# Patient Record
Sex: FEMALE | Race: WHITE | NOT HISPANIC OR LATINO
[De-identification: names, ages, dates, MRNs, and addresses within clinical notes are randomized per-mention and may not be internally consistent; named-entity substitution may affect disease eponyms.]

---

## 2017-07-28 PROBLEM — Z00.00 ENCOUNTER FOR PREVENTIVE HEALTH EXAMINATION: Status: ACTIVE | Noted: 2017-07-28

## 2017-07-31 ENCOUNTER — APPOINTMENT (OUTPATIENT)
Dept: ORTHOPEDIC SURGERY | Facility: CLINIC | Age: 82
End: 2017-07-31
Payer: MEDICARE

## 2017-07-31 VITALS
HEART RATE: 84 BPM | HEIGHT: 64 IN | BODY MASS INDEX: 19.63 KG/M2 | SYSTOLIC BLOOD PRESSURE: 138 MMHG | DIASTOLIC BLOOD PRESSURE: 74 MMHG | WEIGHT: 115 LBS

## 2017-07-31 DIAGNOSIS — Z82.61 FAMILY HISTORY OF ARTHRITIS: ICD-10-CM

## 2017-07-31 DIAGNOSIS — Z82.62 FAMILY HISTORY OF OSTEOPOROSIS: ICD-10-CM

## 2017-07-31 PROCEDURE — 99203 OFFICE O/P NEW LOW 30 MIN: CPT | Mod: 25

## 2017-07-31 PROCEDURE — 73130 X-RAY EXAM OF HAND: CPT | Mod: 26,RT

## 2017-07-31 PROCEDURE — 73110 X-RAY EXAM OF WRIST: CPT | Mod: 26,RT

## 2017-07-31 PROCEDURE — 20610 DRAIN/INJ JOINT/BURSA W/O US: CPT | Mod: RT

## 2017-08-07 ENCOUNTER — APPOINTMENT (OUTPATIENT)
Dept: ORTHOPEDIC SURGERY | Facility: CLINIC | Age: 82
End: 2017-08-07
Payer: MEDICARE

## 2017-08-07 PROCEDURE — 99213 OFFICE O/P EST LOW 20 MIN: CPT

## 2018-07-13 PROBLEM — M65.4 DE QUERVAIN'S DISEASE (RADIAL STYLOID TENOSYNOVITIS): Status: ACTIVE | Noted: 2017-07-31

## 2018-07-13 PROBLEM — M18.11 PRIMARY OSTEOARTHRITIS OF FIRST CARPOMETACARPAL JOINT OF RIGHT HAND: Status: ACTIVE | Noted: 2017-07-31

## 2018-07-16 ENCOUNTER — APPOINTMENT (OUTPATIENT)
Dept: ORTHOPEDIC SURGERY | Facility: CLINIC | Age: 83
End: 2018-07-16
Payer: MEDICARE

## 2018-07-16 VITALS — HEIGHT: 64 IN | BODY MASS INDEX: 19.63 KG/M2 | WEIGHT: 115 LBS

## 2018-07-16 DIAGNOSIS — M65.4 RADIAL STYLOID TENOSYNOVITIS [DE QUERVAIN]: ICD-10-CM

## 2018-07-16 DIAGNOSIS — M18.11 UNILATERAL PRIMARY OSTEOARTHRITIS OF FIRST CARPOMETACARPAL JOINT, RIGHT HAND: ICD-10-CM

## 2018-07-16 PROCEDURE — 99213 OFFICE O/P EST LOW 20 MIN: CPT | Mod: 25

## 2018-07-16 PROCEDURE — 20551 NJX 1 TENDON ORIGIN/INSJ: CPT | Mod: RT

## 2021-06-07 ENCOUNTER — APPOINTMENT (OUTPATIENT)
Dept: CARDIOLOGY | Facility: CLINIC | Age: 86
End: 2021-06-07

## 2022-11-14 ENCOUNTER — INPATIENT (INPATIENT)
Facility: HOSPITAL | Age: 87
LOS: 2 days | Discharge: HOME | DRG: 176 | End: 2022-11-17
Attending: INTERNAL MEDICINE | Admitting: INTERNAL MEDICINE
Payer: MEDICARE

## 2022-11-14 VITALS
HEART RATE: 88 BPM | SYSTOLIC BLOOD PRESSURE: 176 MMHG | HEIGHT: 64 IN | OXYGEN SATURATION: 97 % | TEMPERATURE: 98 F | WEIGHT: 104.94 LBS | RESPIRATION RATE: 16 BRPM | DIASTOLIC BLOOD PRESSURE: 86 MMHG

## 2022-11-14 DIAGNOSIS — I10 ESSENTIAL (PRIMARY) HYPERTENSION: ICD-10-CM

## 2022-11-14 DIAGNOSIS — R55 SYNCOPE AND COLLAPSE: ICD-10-CM

## 2022-11-14 DIAGNOSIS — K59.00 CONSTIPATION, UNSPECIFIED: ICD-10-CM

## 2022-11-14 DIAGNOSIS — I21.4 NON-ST ELEVATION (NSTEMI) MYOCARDIAL INFARCTION: ICD-10-CM

## 2022-11-14 LAB
ALBUMIN SERPL ELPH-MCNC: 4.3 G/DL — SIGNIFICANT CHANGE UP (ref 3.3–5)
ALP SERPL-CCNC: 78 U/L — SIGNIFICANT CHANGE UP (ref 40–120)
ALT FLD-CCNC: 23 U/L — SIGNIFICANT CHANGE UP (ref 10–45)
ANION GAP SERPL CALC-SCNC: 11 MMOL/L — SIGNIFICANT CHANGE UP (ref 5–17)
APPEARANCE UR: CLEAR — SIGNIFICANT CHANGE UP
APTT BLD: 28.4 SEC — SIGNIFICANT CHANGE UP (ref 27.5–35.5)
APTT BLD: 32.4 SEC — SIGNIFICANT CHANGE UP (ref 27.5–35.5)
AST SERPL-CCNC: 28 U/L — SIGNIFICANT CHANGE UP (ref 10–40)
BACTERIA # UR AUTO: NEGATIVE — SIGNIFICANT CHANGE UP
BASOPHILS # BLD AUTO: 0.06 K/UL — SIGNIFICANT CHANGE UP (ref 0–0.2)
BASOPHILS NFR BLD AUTO: 0.6 % — SIGNIFICANT CHANGE UP (ref 0–2)
BILIRUB SERPL-MCNC: 0.3 MG/DL — SIGNIFICANT CHANGE UP (ref 0.2–1.2)
BILIRUB UR-MCNC: NEGATIVE — SIGNIFICANT CHANGE UP
BUN SERPL-MCNC: 22 MG/DL — SIGNIFICANT CHANGE UP (ref 7–23)
CALCIUM SERPL-MCNC: 10 MG/DL — SIGNIFICANT CHANGE UP (ref 8.4–10.5)
CHLORIDE SERPL-SCNC: 100 MMOL/L — SIGNIFICANT CHANGE UP (ref 96–108)
CK MB BLD-MCNC: 12 % — HIGH (ref 0–3.5)
CK MB CFR SERPL CALC: 11 NG/ML — HIGH (ref 0–3.8)
CK MB CFR SERPL CALC: 14.7 NG/ML — HIGH (ref 0–3.8)
CK SERPL-CCNC: 123 U/L — SIGNIFICANT CHANGE UP (ref 25–170)
CO2 SERPL-SCNC: 25 MMOL/L — SIGNIFICANT CHANGE UP (ref 22–31)
COLOR SPEC: SIGNIFICANT CHANGE UP
CREAT SERPL-MCNC: 0.52 MG/DL — SIGNIFICANT CHANGE UP (ref 0.5–1.3)
D DIMER BLD IA.RAPID-MCNC: 395 NG/ML DDU — HIGH
DIFF PNL FLD: NEGATIVE — SIGNIFICANT CHANGE UP
EGFR: 91 ML/MIN/1.73M2 — SIGNIFICANT CHANGE UP
EOSINOPHIL # BLD AUTO: 0.05 K/UL — SIGNIFICANT CHANGE UP (ref 0–0.5)
EOSINOPHIL NFR BLD AUTO: 0.5 % — SIGNIFICANT CHANGE UP (ref 0–6)
EPI CELLS # UR: 0 /HPF — SIGNIFICANT CHANGE UP
GLUCOSE SERPL-MCNC: 113 MG/DL — HIGH (ref 70–99)
GLUCOSE UR QL: NEGATIVE — SIGNIFICANT CHANGE UP
HCT VFR BLD CALC: 41.1 % — SIGNIFICANT CHANGE UP (ref 34.5–45)
HGB BLD-MCNC: 13.3 G/DL — SIGNIFICANT CHANGE UP (ref 11.5–15.5)
HYALINE CASTS # UR AUTO: 0 /LPF — SIGNIFICANT CHANGE UP (ref 0–2)
IMM GRANULOCYTES NFR BLD AUTO: 0.3 % — SIGNIFICANT CHANGE UP (ref 0–0.9)
INR BLD: 1.1 RATIO — SIGNIFICANT CHANGE UP (ref 0.88–1.16)
KETONES UR-MCNC: NEGATIVE — SIGNIFICANT CHANGE UP
LEUKOCYTE ESTERASE UR-ACNC: ABNORMAL
LYMPHOCYTES # BLD AUTO: 1.08 K/UL — SIGNIFICANT CHANGE UP (ref 1–3.3)
LYMPHOCYTES # BLD AUTO: 10.4 % — LOW (ref 13–44)
MCHC RBC-ENTMCNC: 29.1 PG — SIGNIFICANT CHANGE UP (ref 27–34)
MCHC RBC-ENTMCNC: 32.4 GM/DL — SIGNIFICANT CHANGE UP (ref 32–36)
MCV RBC AUTO: 89.9 FL — SIGNIFICANT CHANGE UP (ref 80–100)
MONOCYTES # BLD AUTO: 0.68 K/UL — SIGNIFICANT CHANGE UP (ref 0–0.9)
MONOCYTES NFR BLD AUTO: 6.5 % — SIGNIFICANT CHANGE UP (ref 2–14)
NEUTROPHILS # BLD AUTO: 8.5 K/UL — HIGH (ref 1.8–7.4)
NEUTROPHILS NFR BLD AUTO: 81.7 % — HIGH (ref 43–77)
NITRITE UR-MCNC: NEGATIVE — SIGNIFICANT CHANGE UP
NRBC # BLD: 0 /100 WBCS — SIGNIFICANT CHANGE UP (ref 0–0)
PH UR: 6.5 — SIGNIFICANT CHANGE UP (ref 5–8)
PLATELET # BLD AUTO: 741 K/UL — HIGH (ref 150–400)
POTASSIUM SERPL-MCNC: 4 MMOL/L — SIGNIFICANT CHANGE UP (ref 3.5–5.3)
POTASSIUM SERPL-SCNC: 4 MMOL/L — SIGNIFICANT CHANGE UP (ref 3.5–5.3)
PROT SERPL-MCNC: 7.6 G/DL — SIGNIFICANT CHANGE UP (ref 6–8.3)
PROT UR-MCNC: NEGATIVE — SIGNIFICANT CHANGE UP
PROTHROM AB SERPL-ACNC: 12.7 SEC — SIGNIFICANT CHANGE UP (ref 10.5–13.4)
RBC # BLD: 4.57 M/UL — SIGNIFICANT CHANGE UP (ref 3.8–5.2)
RBC # FLD: 14.6 % — HIGH (ref 10.3–14.5)
RBC CASTS # UR COMP ASSIST: 0 /HPF — SIGNIFICANT CHANGE UP (ref 0–4)
SARS-COV-2 RNA SPEC QL NAA+PROBE: SIGNIFICANT CHANGE UP
SODIUM SERPL-SCNC: 136 MMOL/L — SIGNIFICANT CHANGE UP (ref 135–145)
SP GR SPEC: 1.01 — SIGNIFICANT CHANGE UP (ref 1.01–1.02)
TROPONIN T, HIGH SENSITIVITY RESULT: 352 NG/L — HIGH (ref 0–51)
TROPONIN T, HIGH SENSITIVITY RESULT: 384 NG/L — HIGH (ref 0–51)
TROPONIN T, HIGH SENSITIVITY RESULT: 475 NG/L — HIGH (ref 0–51)
TROPONIN T, HIGH SENSITIVITY RESULT: 485 NG/L — HIGH (ref 0–51)
UROBILINOGEN FLD QL: NEGATIVE — SIGNIFICANT CHANGE UP
WBC # BLD: 10.4 K/UL — SIGNIFICANT CHANGE UP (ref 3.8–10.5)
WBC # FLD AUTO: 10.4 K/UL — SIGNIFICANT CHANGE UP (ref 3.8–10.5)
WBC UR QL: 3 /HPF — SIGNIFICANT CHANGE UP (ref 0–5)

## 2022-11-14 PROCEDURE — 93010 ELECTROCARDIOGRAM REPORT: CPT

## 2022-11-14 PROCEDURE — 99291 CRITICAL CARE FIRST HOUR: CPT

## 2022-11-14 PROCEDURE — 72125 CT NECK SPINE W/O DYE: CPT | Mod: 26,MA

## 2022-11-14 PROCEDURE — 93306 TTE W/DOPPLER COMPLETE: CPT | Mod: 26

## 2022-11-14 PROCEDURE — 70450 CT HEAD/BRAIN W/O DYE: CPT | Mod: 26,MA

## 2022-11-14 RX ORDER — HEPARIN SODIUM 5000 [USP'U]/ML
INJECTION INTRAVENOUS; SUBCUTANEOUS
Qty: 25000 | Refills: 0 | Status: DISCONTINUED | OUTPATIENT
Start: 2022-11-14 | End: 2022-11-15

## 2022-11-14 RX ORDER — HEPARIN SODIUM 5000 [USP'U]/ML
2900 INJECTION INTRAVENOUS; SUBCUTANEOUS EVERY 6 HOURS
Refills: 0 | Status: DISCONTINUED | OUTPATIENT
Start: 2022-11-14 | End: 2022-11-15

## 2022-11-14 RX ORDER — ATORVASTATIN CALCIUM 80 MG/1
80 TABLET, FILM COATED ORAL AT BEDTIME
Refills: 0 | Status: DISCONTINUED | OUTPATIENT
Start: 2022-11-14 | End: 2022-11-17

## 2022-11-14 RX ORDER — METOPROLOL TARTRATE 50 MG
25 TABLET ORAL
Refills: 0 | Status: DISCONTINUED | OUTPATIENT
Start: 2022-11-14 | End: 2022-11-17

## 2022-11-14 RX ORDER — ACETAMINOPHEN 500 MG
650 TABLET ORAL ONCE
Refills: 0 | Status: COMPLETED | OUTPATIENT
Start: 2022-11-14 | End: 2022-11-14

## 2022-11-14 RX ORDER — ASPIRIN/CALCIUM CARB/MAGNESIUM 324 MG
324 TABLET ORAL ONCE
Refills: 0 | Status: COMPLETED | OUTPATIENT
Start: 2022-11-14 | End: 2022-11-14

## 2022-11-14 RX ORDER — TETANUS TOXOID, REDUCED DIPHTHERIA TOXOID AND ACELLULAR PERTUSSIS VACCINE, ADSORBED 5; 2.5; 8; 8; 2.5 [IU]/.5ML; [IU]/.5ML; UG/.5ML; UG/.5ML; UG/.5ML
0.5 SUSPENSION INTRAMUSCULAR ONCE
Refills: 0 | Status: COMPLETED | OUTPATIENT
Start: 2022-11-14 | End: 2022-11-14

## 2022-11-14 RX ORDER — ASPIRIN/CALCIUM CARB/MAGNESIUM 324 MG
81 TABLET ORAL DAILY
Refills: 0 | Status: DISCONTINUED | OUTPATIENT
Start: 2022-11-15 | End: 2022-11-17

## 2022-11-14 RX ORDER — PSYLLIUM SEED (WITH DEXTROSE)
1 POWDER (GRAM) ORAL
Refills: 0 | Status: DISCONTINUED | OUTPATIENT
Start: 2022-11-14 | End: 2022-11-17

## 2022-11-14 RX ADMIN — TETANUS TOXOID, REDUCED DIPHTHERIA TOXOID AND ACELLULAR PERTUSSIS VACCINE, ADSORBED 0.5 MILLILITER(S): 5; 2.5; 8; 8; 2.5 SUSPENSION INTRAMUSCULAR at 11:07

## 2022-11-14 RX ADMIN — HEPARIN SODIUM 600 UNIT(S)/HR: 5000 INJECTION INTRAVENOUS; SUBCUTANEOUS at 13:12

## 2022-11-14 RX ADMIN — Medication 25 MILLIGRAM(S): at 18:00

## 2022-11-14 RX ADMIN — Medication 650 MILLIGRAM(S): at 23:49

## 2022-11-14 RX ADMIN — HEPARIN SODIUM 2900 UNIT(S): 5000 INJECTION INTRAVENOUS; SUBCUTANEOUS at 20:45

## 2022-11-14 RX ADMIN — Medication 2.5 MILLIGRAM(S): at 14:51

## 2022-11-14 RX ADMIN — HEPARIN SODIUM 750 UNIT(S)/HR: 5000 INJECTION INTRAVENOUS; SUBCUTANEOUS at 20:42

## 2022-11-14 RX ADMIN — Medication 324 MILLIGRAM(S): at 12:27

## 2022-11-14 RX ADMIN — ATORVASTATIN CALCIUM 80 MILLIGRAM(S): 80 TABLET, FILM COATED ORAL at 14:50

## 2022-11-14 NOTE — H&P ADULT - NSICDXPASTMEDICALHX_GEN_ALL_CORE_FT
PAST MEDICAL HISTORY:  2019 novel coronavirus disease (COVID-19) in 2020 March    HTN (hypertension)     UTI (urinary tract infection), bacterial

## 2022-11-14 NOTE — H&P ADULT - NSHPADDITIONALINFOADULT_GEN_ALL_CORE
discussed with patient in detail, expresses understanding of treatment plans.  discussed with daughter at bedside in detail  discussed with patient's son at the bedside in detail

## 2022-11-14 NOTE — H&P ADULT - NSHPPHYSICALEXAM_GEN_ALL_CORE
PHYSICAL EXAM: vital signs noted on Sunrise  in no apparent distress  HEENT: ANTHONY EOMI  Neck: Supple, no JVD, no thyromegaly  Lungs: no wheeze, no crackles  CVS: S1 S2 no M/R/G  Abdomen: no tenderness, no organomegaly, BS present  Neuro: AO x 3 no focal weakness, no sensory abnormalities  Psych: appropriate affect  Skin: warm, dry mild abrasions both lipd and tip of nose  Ext: no cyanosis or clubbing, no edema  Msk: no joint swelling or deformities  Back: no CVA tenderness, no kyphosis/scoliosis

## 2022-11-14 NOTE — ED PROVIDER NOTE - PROGRESS NOTE DETAILS
Pt elevated troponin to 382, pt remains asymptomatic. Cardiology c/s placed, will see patient. pt reassessed has no CP, reports that she has no SOB she feels anxious which she gets in the hospital, pt informed of elevated troponin, has known hx of thrombocytosis, follows w/ Dr peters Repeat ECG w/ unchanged ST depressions in II, III, AVF.

## 2022-11-14 NOTE — H&P ADULT - PROBLEM SELECTOR PLAN 2
high trop in the face of normal renal function  cardiology evaluation noted  no need for emergent coronary angiogram  IV heparin  ASA and B Blocker   Lipitor 80 mg   continue to follow cardiology recommendations

## 2022-11-14 NOTE — ED ADULT NURSE NOTE - OBJECTIVE STATEMENT
pt is A&Ox3, ambulatory, able to make needs known and presents for eval following unwitnessed fall this morning in which PT bruised the right side of her nose along with her upper and lower lips. PT also reports chipping her tooth. Pt unsure if fall was syncope vs mechanical. PT has abrasion to right hand 5th digit. PT denies any head trauma with none noted. PT reports daily Aspirin use but missed todays dose. At time of assessment PT denies any chest pain, dizziness, SOB, vision changes or nausea. Orders pending

## 2022-11-14 NOTE — H&P ADULT - NSHPREVIEWOFSYSTEMS_GEN_ALL_CORE
Gen: + recent loss of wt no loss of appetite  ENT: no dizziness no hearing loss  Ophth: no blurring of vision no loss of vision  Resp: No cough no sputum production  CVS: No chest pain no palpitations no orthopnea  GI: no nausea, vomiting or diarrhea   : no dysuria, hematuria  Endo: no polyuria no excessive sweating  Neuro: no weakness no paresthesias  Heme: No petechiae no easy bruising  Msk: No joint pain no swelling  Skin: No rash no itching

## 2022-11-14 NOTE — ED ADULT TRIAGE NOTE - PAIN RATING/NUMBER SCALE (0-10): ACTIVITY
0 Complex Repair And Ftsg Text: The defect edges were debeveled with a #15 scalpel blade.  The primary defect was closed partially with a complex linear closure.  Given the location of the defect, shape of the defect and the proximity to free margins a full thickness skin graft was deemed most appropriate to repair the remaining defect.  The graft was trimmed to fit the size of the remaining defect.  The graft was then placed in the primary defect, oriented appropriately, and sutured into place.

## 2022-11-14 NOTE — H&P ADULT - HISTORY OF PRESENT ILLNESS
87yoF PMH HTN presents after an unwitnessed fall. Pt states she was moving her car out of her garage, got out of the car and doesn't remember falling. She sustained abrasions to her lips and nose. After a few minutes she remembers walking to the garage door and then having a conversation with a passer by. She does remember going back to her apartment. Pt doesn't remember if she hit her head, but hit her mouth and chipped two teeth. She takes ASA daily. Denies preceding dizziness, chest pain, SOB, abdominal pain N/V/D, dysuria, hematuria. Pt reports one prior episode of LOC when she was diagnosed with a UTI a while back. States had a COVID booster dose 2 weeks ago.

## 2022-11-14 NOTE — ED PROVIDER NOTE - OBJECTIVE STATEMENT
87yoF PMH HTN presents after an unwitnessed fall. Pt states she was moving her car out of her garage, got out of the car and doesn't remember falling or getting back to her apartment. Pt doesn't remember if she hit her head, but hit her mouth and chipped two teeth. She takes ASA daily. Denies dizziness, chest pain, SOB, abdominal pain N/V/D, dysuria, hematuria. Pt reports one prior episode of LOC when she was diagnosed with a UTI.

## 2022-11-14 NOTE — ED PROVIDER NOTE - CLINICAL SUMMARY MEDICAL DECISION MAKING FREE TEXT BOX
88yo F with PMH HTN s/p unwitnessed fall, pt does not remember falling or getting back to her apartment. EKG showing ST depressions, c/f ACS. CBC, CKMB, trops, coags, cmp ordered. Pending CTH, CT c-spine

## 2022-11-14 NOTE — CONSULT NOTE ADULT - SUBJECTIVE AND OBJECTIVE BOX
Patient seen and evaluated at bedside    Chief Complaint: Syncope    HPI:    87yoF PMH HTN presents after an unwitnessed fall. Pt states she was moving her car out of her garage, got out of the car and doesn't remember falling or getting back to her apartment. Pt doesn't remember if she hit her head, but hit her mouth and chipped two teeth. She takes ASA daily. Denies dizziness, chest pain, SOB, abdominal pain N/V/D, dysuria, hematuria. Pt reports one prior episode of LOC when she was diagnosed with a UTI.      PMHx:   HTN (hypertension)        PSHx:       Allergies:  penicillin (Unknown)      Home Meds:    Current Medications:       FAMILY HISTORY:      Social History:  Smoking History:  Alcohol Use:  Drug Use:    REVIEW OF SYSTEMS:  Constitutional:     [ ] negative [ ] fevers [ ] chills [ ] weight loss [ ] weight gain  HEENT:                  [ ] negative [ ] dry eyes [ ] eye irritation [ ] postnasal drip [ ] nasal congestion  CV:                         [ ] negative  [ ] chest pain [ ] orthopnea [ ] palpitations [ ] murmur  Resp:                     [ ] negative [ ] cough [ ] shortness of breath [ ] dyspnea [ ] wheezing [ ] sputum [ ]hemoptysis  GI:                          [ ] negative [ ] nausea [ ] vomiting [ ] diarrhea [ ] constipation [ ] abd pain [ ] dysphagia   :                        [ ] negative [ ] dysuria [ ] nocturia [ ] hematuria [ ] increased urinary frequency  Musculoskeletal: [ ] negative [ ] back pain [ ] myalgias [ ] arthralgias [ ] fracture  Skin:                       [ ] negative [ ] rash [ ] itch  Neurological:        [ ] negative [ ] headache [ ] dizziness [ ] syncope [ ] weakness [ ] numbness  Psychiatric:           [ ] negative [ ] anxiety [ ] depression  Endocrine:            [ ] negative [ ] diabetes [ ] thyroid problem  Heme/Lymph:      [ ] negative [ ] anemia [ ] bleeding problem  Allergic/Immune: [ ] negative [ ] itchy eyes [ ] nasal discharge [ ] hives [ ] angioedema    [ ] All other systems negative  [ ] Unable to assess ROS due to      Physical Exam:  T(F): 97.7 (11-14), Max: 97.7 (11-14)  HR: 88 (11-14) (88 - 88)  BP: 176/86 (11-14) (176/86 - 176/86)  RR: 16 (11-14)  SpO2: 97% (11-14)  GENERAL: No acute distress, well-developed  HEAD:  Atraumatic, Normocephalic  ENT: EOMI, PERRLA, conjunctiva and sclera clear, Neck supple, No JVD, moist mucosa  CHEST/LUNG: Clear to auscultation bilaterally; No wheeze, equal breath sounds bilaterally   BACK: No spinal tenderness  HEART: Regular rate and rhythm; No murmurs, rubs, or gallops  ABDOMEN: Soft, Nontender, Nondistended; Bowel sounds present  EXTREMITIES:  No clubbing, cyanosis, or edema  PSYCH: Nl behavior, nl affect  NEUROLOGY: AAOx3, non-focal, cranial nerves intact  SKIN: Normal color, No rashes or lesions  LINES:    Cardiovascular Diagnostic Testing:    ECG: Personally reviewed: sinus rhythm with rate of 92, LVH by Zaynab Portilol with evidence of ST segemtn depression most likely in the setting of LVH.    Echo: Personally reviewed:    Stress Testing:    Cath:    Imaging:    CXR: Personally reviewed    Labs: Personally reviewed                        13.3   10.40 )-----------( 741      ( 14 Nov 2022 11:26 )             41.1     11-14    136  |  100  |  22  ----------------------------<  113<H>  4.0   |  25  |  0.52    Ca    10.0      14 Nov 2022 11:26    TPro  7.6  /  Alb  4.3  /  TBili  0.3  /  DBili  x   /  AST  28  /  ALT  23  /  AlkPhos  78  11-14    PT/INR - ( 14 Nov 2022 11:26 )   PT: 12.7 sec;   INR: 1.10 ratio         PTT - ( 14 Nov 2022 11:26 )  PTT:28.4 sec            CARDIAC MARKERS ( 14 Nov 2022 11:26 )  384 ng/L / x     / x     / x     / x     / 11.0 ng/mL      penicillin (Unknown)      T(F): 97.7 (11-14), Max: 97.7 (11-14)  HR: 88 (11-14) (88 - 88)  BP: 176/86 (11-14) (176/86 - 176/86)  RR: 16 (11-14)  SpO2: 97% (11-14) Patient seen and evaluated at bedside    Chief Complaint: Syncope    HPI:    87yoF PMH HTN presents after an unwitnessed fall. Pt states she was moving her car out of her garage, got out of the car and doesn't remember falling or getting back to her apartment. She remembers being down on the ground and feeling dizzy when she got back uup. The dizziness subsided. She remembers talking with her neighbor next to her garage but does not remember walking to the garage or walking inside her house.  Pt doesn't remember if she hit her head, but hit her mouth and chipped two teeth. She did not have any chest pain or palpitations prior to her fall. She takes ASA daily. Denies dizziness, chest pain, SOB, abdominal pain N/V/D, dysuria, hematuria. She is usually able to walk for 4 miles without shortness of breath or chest pain. Pt reports one prior episode of LOC when she was diagnosed with a UTI.  She has not had nay recent illnesses or sick contacts.   She does not have any family history of sudden cardiac death.    PMHx:   HTN (hypertension)        PSHx:       Allergies:  penicillin (Unknown)      Home Meds:  ASA 81mg  Enalapril 2.5mg daily    Current Medications:       FAMILY HISTORY:      Social History:  Remote smoking history    REVIEW OF SYSTEMS:  Constitutional:     [ ] negative [ ] fevers [ ] chills [ ] weight loss [ ] weight gain  HEENT:                  [ ] negative [ ] dry eyes [ ] eye irritation [ ] postnasal drip [ ] nasal congestion  CV:                         [ ] negative  [ ] chest pain [ ] orthopnea [ ] palpitations [ ] murmur  Resp:                     [ ] negative [ ] cough [ ] shortness of breath [ ] dyspnea [ ] wheezing [ ] sputum [ ]hemoptysis  GI:                          [ ] negative [ ] nausea [ ] vomiting [ ] diarrhea [ ] constipation [ ] abd pain [ ] dysphagia   :                        [ ] negative [ ] dysuria [ ] nocturia [ ] hematuria [ ] increased urinary frequency  Musculoskeletal: [ ] negative [ ] back pain [ ] myalgias [ ] arthralgias [ ] fracture  Skin:                       [ ] negative [ ] rash [ ] itch  Neurological:        [ ] negative [ ] headache [ ] dizziness [ ] syncope [ ] weakness [ ] numbness  Psychiatric:           [ ] negative [ ] anxiety [ ] depression  Endocrine:            [ ] negative [ ] diabetes [ ] thyroid problem  Heme/Lymph:      [ ] negative [ ] anemia [ ] bleeding problem  Allergic/Immune: [ ] negative [ ] itchy eyes [ ] nasal discharge [ ] hives [ ] angioedema        Physical Exam:  T(F): 97.7 (11-14), Max: 97.7 (11-14)  HR: 88 (11-14) (88 - 88)  BP: 176/86 (11-14) (176/86 - 176/86)  RR: 16 (11-14)  SpO2: 97% (11-14)  GENERAL: No acute distress, well-developed  HEAD: Has evidence of trauma on the right sided of her face with 2 chipped front teeth  ENT: EOMI, PERRLA, conjunctiva and sclera clear, Neck supple, No JVD, moist mucosa  CHEST/LUNG: Clear to auscultation bilaterally; No wheeze, equal breath sounds bilaterally   BACK: No spinal tenderness  HEART: Regular rate and rhythm; No murmurs, rubs, or gallops  ABDOMEN: Soft, Nontender, Nondistended; Bowel sounds present  EXTREMITIES:  No clubbing, cyanosis, or edema    Cardiovascular Diagnostic Testing:    ECG: Personally reviewed: sinus rhythm with rate of 92, LVH by Sokoangel Lyverito with evidence of ST segemtn depression most likely in the setting of LVH.    Echo: Personally reviewed:    Imaging:    IMPRESSION:  No acute intracranial hemorrhage    No acute fracture cervical spine. If pain persists, follow-up MRI exam recommended    --- End of Report ---    CXR: Personally reviewed     Labs: Personally reviewed                        13.3   10.40 )-----------( 741      ( 14 Nov 2022 11:26 )             41.1     11-14    136  |  100  |  22  ----------------------------<  113<H>  4.0   |  25  |  0.52    Ca    10.0      14 Nov 2022 11:26    TPro  7.6  /  Alb  4.3  /  TBili  0.3  /  DBili  x   /  AST  28  /  ALT  23  /  AlkPhos  78  11-14    PT/INR - ( 14 Nov 2022 11:26 )   PT: 12.7 sec;   INR: 1.10 ratio         PTT - ( 14 Nov 2022 11:26 )  PTT:28.4 sec            CARDIAC MARKERS ( 14 Nov 2022 11:26 )  384 ng/L / x     / x     / x     / x     / 11.0 ng/mL      penicillin (Unknown)      T(F): 97.7 (11-14), Max: 97.7 (11-14)  HR: 88 (11-14) (88 - 88)  BP: 176/86 (11-14) (176/86 - 176/86)  RR: 16 (11-14)  SpO2: 97% (11-14)

## 2022-11-14 NOTE — CONSULT NOTE ADULT - SUBJECTIVE AND OBJECTIVE BOX
C A R D I O L O G Y  *********************    DATE OF SERVICE: 11-14-22    HISTORY OF PRESENT ILLNESS: HPI:  87yoF PMH HTN presents after an unwitnessed fall. Pt states she was moving her car out of her garage, got out of the car and doesn't remember falling. She sustained abrasions to her lips and nose. After a few minutes she remembers walking to the garage door and then having a conversation with a passer by. She does remember going back to her apartment. Pt doesn't remember if she hit her head, but hit her mouth and chipped two teeth. She takes ASA daily. Denies preceding dizziness, chest pain, SOB, abdominal pain N/V/D, dysuria, hematuria. Pt reports one prior episode of LOC when she was diagnosed with a UTI a while back. States had a COVID booster dose 2 weeks ago.  She had no prodromal symptoms prior to LOC and seemed confused after regaining conciousness.      PAST MEDICAL & SURGICAL HISTORY:  HTN (hypertension)      UTI (urinary tract infection), bacterial      2019 novel coronavirus disease (COVID-19)  in 2020 March      No significant past surgical history              MEDICATIONS:  MEDICATIONS  (STANDING):  atorvastatin 80 milliGRAM(s) Oral at bedtime  enalapril 2.5 milliGRAM(s) Oral daily  heparin  Infusion.  Unit(s)/Hr (6 mL/Hr) IV Continuous <Continuous>  metoprolol tartrate 25 milliGRAM(s) Oral two times a day      Allergies    penicillin (Unknown)    Intolerances        FAMILY HISTORY:  No pertinent family history in first degree relatives      Non-contributary for premature coronary disease or sudden cardiac death    SOCIAL HISTORY:    [ X] Non-smoker  [ ] Smoker  [ ] Alcohol      REVIEW OF SYSTEMS:  [ ]chest pain  [  ]shortness of breath  [  ]palpitations  [  ]syncope  [ ]near syncope [ ]upper extremity weakness   [ ] lower extremity weakness  [  ]diplopia  [  ]altered mental status   [  ]fevers  [ ]chills [ ]nausea  [ ]vomitting  [  ]dysphagia    [ ]abdominal pain  [ ]melena  [ ]BRBPR    [  ]epistaxis  [  ]rash    [ ]lower extremity edema        [X] All others negative	  [ ] Unable to obtain      LABS:	 	    CARDIAC MARKERS:  CARDIAC MARKERS ( 14 Nov 2022 12:32 )  x     / x     / 123 U/L / x     / 14.7 ng/mL  CARDIAC MARKERS ( 14 Nov 2022 11:26 )  x     / x     / x     / x     / 11.0 ng/mL                                  13.3   10.40 )-----------( 741      ( 14 Nov 2022 11:26 )             41.1     Hb Trend: 13.3<--    11-14    136  |  100  |  22  ----------------------------<  113<H>  4.0   |  25  |  0.52    Ca    10.0      14 Nov 2022 11:26    TPro  7.6  /  Alb  4.3  /  TBili  0.3  /  DBili  x   /  AST  28  /  ALT  23  /  AlkPhos  78  11-14    Creatinine Trend: 0.52<--    Coags:  PT/INR - ( 14 Nov 2022 11:26 )   PT: 12.7 sec;   INR: 1.10 ratio         PTT - ( 14 Nov 2022 11:26 )  PTT:28.4 sec          PHYSICAL EXAM:  T(C): 36.5 (11-14-22 @ 13:31), Max: 36.5 (11-14-22 @ 09:53)  HR: 80 (11-14-22 @ 13:31) (80 - 88)  BP: 145/87 (11-14-22 @ 13:31) (145/87 - 176/86)  RR: 18 (11-14-22 @ 13:31) (16 - 18)  SpO2: 96% (11-14-22 @ 13:31) (96% - 97%)  Wt(kg): --   BMI (kg/m2): 18.8 (11-14-22 @ 12:30)  I&O's Summary      HEENT:  (-)icterus (-)pallor  CV: N S1 S2 1/6 KARIS (+)2 Pulses B/l  Resp:  Clear to ausculatation B/L, normal effort  GI: (+) BS Soft, NT, ND  Lymph:  (-)Edema, (-)obvious lymphadenopathy  Skin: Warm to touch, Normal turgor  Psych: Appropriate mood and affect        ECG:  	Sinus 81 BPM LVH with repolarization     RADIOLOGY:         CXR:  PENDING    ASSESSMENT/PLAN: 	87y Female hx of HTN admitted with traumatic syncope and mildly positive trop.    - Clinical presentation is inconsistent with an acute coronary syndrome  - Elevated DDimmer f/u CTPA  - If no PE can D/C heparin gtt  - Echo, she has significant LVH on EKG ? dynamic outflow tract obstruction or intracavitary gradient although not suggested by physical exam  - cont tele  - Orthostatic BP  - Consider Neuro Eval ? post ictal state  - EP eval Dr. Meza for traumatic syncope    I once again thank you for allowing me to participate in the care of your patient.  If you have any questions or concerns please do not hesitate to contact me.    Zi Shepherd MD, [facility]  BEEPER [phone]

## 2022-11-14 NOTE — ED PROVIDER NOTE - ATTENDING CONTRIBUTION TO CARE
87 F w/ hx of HTN not on AC but on asa 81 mg daily p/w unwitnessed fall this AM which bruised her nose and lips pt states that she doesn't remember falling or getting back to her apartment, pt w/ no cp, no sob, no nausea no vomiting, pt states she hit her head, she has no abd pain, no dysuria, no leg swelling, no fevers, no chills. On exam, Const: no acute distress, Well-developed, Eyes: no conjunctival injection and no scleral icterus ENMT: Moist mucus membranes, lip abrasion, CVS: +S1/S2, radial/DP pulse 2+ bilaterally RESP: Unlabored respiratory effort, Clear to auscultation bilaterally GI: Nontender/Nondistended soft abdomen, no CVA tenderness MSK: abrasion on the R hand w/ w/ intact rom of the fingers and wrist no christiano defomritiy  Psych: Awake, Alert, & Orientedx3;  Appropriate mood and affect, cooperative Plan for labs ct head, troponing and reassessment, pt w/ elevated troponin, has no CP, ekg w/ std in inf leads, pt w/ no prior cardiac catheterization in the past reports follows w/ Dr. Loomis, unaffiliated. plan for admission for nstemi, started on heparin gtt.

## 2022-11-14 NOTE — H&P ADULT - ASSESSMENT
87yoF PMH HTN presents after an unwitnessed fall clinical presentation consistent with syncope of unclear etiology possibly arrythmia and NSTEMI

## 2022-11-14 NOTE — CONSULT NOTE ADULT - SUBJECTIVE AND OBJECTIVE BOX
EP    DATE OF SERVICE: 11-14-22    HISTORY OF PRESENT ILLNESS: HPI:  Patient is an 86 y/o Female with PMH of HTN who is admitted after a syncopal episode and found to have an elevated troponin. EP consulted for further evaluation. Patient states she was moving her car out of her garage, got out of the car and doesn't remember falling but waking up on the ground. She sustained abrasions to her lips and nose and chipped two teeth. She had no warning signs this time. No preceding dizziness, chest pain, SOB, palpitations, n/v, or abd pain. She walks 4 miles daily without chest pain or SOB. Pt reports one prior episode of LOC when she was diagnosed with a UTI years ago. States had a COVID booster dose 2 weeks ago.    PAST MEDICAL & SURGICAL HISTORY:  HTN (hypertension)      UTI (urinary tract infection), bacterial      2019 novel coronavirus disease (COVID-19)  in 2020 March      No significant past surgical history    MEDICATIONS:  MEDICATIONS  (STANDING):  atorvastatin 80 milliGRAM(s) Oral at bedtime  enalapril 2.5 milliGRAM(s) Oral daily  heparin  Infusion.  Unit(s)/Hr (6 mL/Hr) IV Continuous <Continuous>  metoprolol tartrate 25 milliGRAM(s) Oral two times a day      Allergies    penicillin (Unknown)    Intolerances        FAMILY HISTORY:  No pertinent family history in first degree relatives      Non-contributary for premature coronary disease or sudden cardiac death    SOCIAL HISTORY:    [x ] Non-smoker  [ ] Smoker  [ ] Alcohol    FLU VACCINE THIS YEAR STARTS IN AUGUST:  [ ] Yes    [ ] No    IF OVER 65 HAVE YOU EVER HAD A PNA VACCINE:  [ ] Yes    [ ] No       [ ] N/A      REVIEW OF SYSTEMS:  [ ]chest pain  [  ]shortness of breath  [  ]palpitations  [x  ]syncope  [ ]near syncope [ ]upper extremity weakness   [ ] lower extremity weakness  [  ]diplopia  [  ]altered mental status   [  ]fevers  [ ]chills [ ]nausea  [ ]vomiting  [  ]dysphagia    [ ]abdominal pain  [ ]melena  [ ]BRBPR    [  ]epistaxis  [  ]rash    [ ]lower extremity edema        [X] All others negative	  [ ] Unable to obtain      LABS:	 	    CARDIAC MARKERS:  CARDIAC MARKERS ( 14 Nov 2022 12:32 )  x     / x     / 123 U/L / x     / 14.7 ng/mL  CARDIAC MARKERS ( 14 Nov 2022 11:26 )  x     / x     / x     / x     / 11.0 ng/mL                              13.3   10.40 )-----------( 741      ( 14 Nov 2022 11:26 )             41.1     Hb Trend: 13.3<--    11-14    136  |  100  |  22  ----------------------------<  113<H>  4.0   |  25  |  0.52    Ca    10.0      14 Nov 2022 11:26    TPro  7.6  /  Alb  4.3  /  TBili  0.3  /  DBili  x   /  AST  28  /  ALT  23  /  AlkPhos  78  11-14    Creatinine Trend: 0.52<--    Coags:  PT/INR - ( 14 Nov 2022 11:26 )   PT: 12.7 sec;   INR: 1.10 ratio         PTT - ( 14 Nov 2022 11:26 )  PTT:28.4 sec    proBNP:   Lipid Profile:   HgA1c:   TSH:         PHYSICAL EXAM:  T(C): 36.5 (11-14-22 @ 13:31), Max: 36.5 (11-14-22 @ 09:53)  HR: 80 (11-14-22 @ 13:31) (80 - 88)  BP: 145/87 (11-14-22 @ 13:31) (145/87 - 176/86)  RR: 18 (11-14-22 @ 13:31) (16 - 18)  SpO2: 96% (11-14-22 @ 13:31) (96% - 97%)  Wt(kg): --   BMI (kg/m2): 18.8 (11-14-22 @ 12:30)  I&O's Summary      Gen: Appears well in NAD  HEENT:  (-)icterus (-)pallor  CV: N S1 S2 1/6 KARIS (+)2 Pulses B/l  Resp:  Clear to auscultation B/L, normal effort  GI: (+) BS Soft, NT, ND  Lymph:  (-)Edema, (-)obvious lymphadenopathy  Skin: Warm to touch, Normal turgor  Psych: Appropriate mood and affect      TELEMETRY: NSR 70s	      ECG: NSR, narrow QRS, diffuse ST depressions, aVR and aVL with 1mm ST elevation 	    RADIOLOGY:         CXR:     ASSESSMENT/PLAN: Patient is an 86 y/o Female with PMH of HTN who is admitted after a syncopal episode and found to have an elevated troponin. EP consulted for further evaluation.    - Monitor telemetry for arrythmia given syncope without prodrome  - Check TTE and orthostatics  - AC with hep gtt per primary team/cardio while workup pending (f/u d-dimer results)  - Follow up TSH  - Continue Enalapril and Metoprolol for HTN  - Ischemic evaluation per cardiology given abnormal EKG and elevated troponin however no anginal symptoms  - Further recs pending above    Felix Moreno PA-C  Pager: [phone]    EP    DATE OF SERVICE: 11-14-22    HISTORY OF PRESENT ILLNESS: HPI:  Patient is an 88 y/o Female with PMH of HTN who is admitted after a syncopal episode and found to have an elevated troponin. EP consulted for further evaluation. Patient states she was moving her car out of her garage, got out of the car and doesn't remember falling but waking up on the ground. She sustained abrasions to her lips and nose and chipped two teeth. She had no warning signs this time. No preceding dizziness, chest pain, SOB, palpitations, n/v, or abd pain. She walks 4 miles daily without chest pain or SOB. Pt reports one prior episode of LOC when she was diagnosed with a UTI years ago. States had a COVID booster dose 2 weeks ago.    PAST MEDICAL & SURGICAL HISTORY:  HTN (hypertension)  UTI (urinary tract infection), bacterial  2019 novel coronavirus disease (COVID-19)  in 2020 March  No significant past surgical history    MEDICATIONS:  MEDICATIONS  (STANDING):  atorvastatin 80 milliGRAM(s) Oral at bedtime  enalapril 2.5 milliGRAM(s) Oral daily  heparin  Infusion.  Unit(s)/Hr (6 mL/Hr) IV Continuous <Continuous>  metoprolol tartrate 25 milliGRAM(s) Oral two times a day      Allergies    penicillin (Unknown)    Intolerances    FAMILY HISTORY:  No pertinent family history in first degree relatives      Non-contributary for premature coronary disease or sudden cardiac death    SOCIAL HISTORY:    [x ] Non-smoker  [ ] Smoker  [ ] Alcohol    FLU VACCINE THIS YEAR STARTS IN AUGUST:  [ ] Yes    [ ] No    IF OVER 65 HAVE YOU EVER HAD A PNA VACCINE:  [ ] Yes    [ ] No       [ ] N/A      REVIEW OF SYSTEMS:  [ ]chest pain  [  ]shortness of breath  [  ]palpitations  [x  ]syncope  [ ]near syncope [ ]upper extremity weakness   [ ] lower extremity weakness  [  ]diplopia  [  ]altered mental status   [  ]fevers  [ ]chills [ ]nausea  [ ]vomiting  [  ]dysphagia    [ ]abdominal pain  [ ]melena  [ ]BRBPR    [  ]epistaxis  [  ]rash    [ ]lower extremity edema        [X] All others negative	  [ ] Unable to obtain      LABS:	 	    CARDIAC MARKERS:  CARDIAC MARKERS ( 14 Nov 2022 12:32 )  x     / x     / 123 U/L / x     / 14.7 ng/mL  CARDIAC MARKERS ( 14 Nov 2022 11:26 )  x     / x     / x     / x     / 11.0 ng/mL                              13.3   10.40 )-----------( 741      ( 14 Nov 2022 11:26 )             41.1     Hb Trend: 13.3<--    11-14    136  |  100  |  22  ----------------------------<  113<H>  4.0   |  25  |  0.52    Ca    10.0      14 Nov 2022 11:26    TPro  7.6  /  Alb  4.3  /  TBili  0.3  /  DBili  x   /  AST  28  /  ALT  23  /  AlkPhos  78  11-14    Creatinine Trend: 0.52<--    Coags:  PT/INR - ( 14 Nov 2022 11:26 )   PT: 12.7 sec;   INR: 1.10 ratio         PTT - ( 14 Nov 2022 11:26 )  PTT:28.4 sec    proBNP:   Lipid Profile:   HgA1c:   TSH:         PHYSICAL EXAM:  T(C): 36.5 (11-14-22 @ 13:31), Max: 36.5 (11-14-22 @ 09:53)  HR: 80 (11-14-22 @ 13:31) (80 - 88)  BP: 145/87 (11-14-22 @ 13:31) (145/87 - 176/86)  RR: 18 (11-14-22 @ 13:31) (16 - 18)  SpO2: 96% (11-14-22 @ 13:31) (96% - 97%)  Wt(kg): --   BMI (kg/m2): 18.8 (11-14-22 @ 12:30)  I&O's Summary      Gen: Appears well in NAD  HEENT:  (-)icterus (-)pallor  CV: N S1 S2 1/6 KARIS (+)2 Pulses B/l  Resp:  Clear to auscultation B/L, normal effort  GI: (+) BS Soft, NT, ND  Lymph:  (-)Edema, (-)obvious lymphadenopathy  Skin: Warm to touch, Normal turgor  Psych: Appropriate mood and affect      TELEMETRY: NSR 70s	      ECG: NSR, narrow QRS, diffuse ST depressions, aVR and aVL with 1mm ST elevation 	    RADIOLOGY:         CXR:     ASSESSMENT/PLAN: Patient is an 88 y/o Female with PMH of HTN who is admitted after a syncopal episode and found to have an elevated troponin. EP consulted for further evaluation.    - Monitor telemetry for arrythmia given syncope without prodrome  - Check TTE and orthostatics  - AC with hep gtt per primary team/cardio while workup pending (f/u d-dimer results)  - Follow up TSH  - Continue Enalapril and Metoprolol for HTN  - Ischemic evaluation per cardiology given abnormal EKG and elevated troponin however no anginal symptoms  - Further recs pending above    Felix Moreno PA-C  Pager: [phone]     Attending Addendum:  86yo woman with new, unexplained syncopal episode occuring while getting up out of her car.  Hx of hypertension.  Admitting diagnosis is NSTEMI.    Syncope - Outside of other labwork and EKG changes, would consider orthostatic hypotension as her collapse occurred rapidly without identifiable prodrome.  Hypertension- continue home dose of enalapril.  NSTEMI- positive troponin x 2 in the setting of normal kidney function, and ischemic-looking EKG (ST depression in II,III,aVF, V4-V6, and 1mm ST elevation in aVR and aVL).  Echo verbally reported with wall motion abnormalities. Follow up on formal read.  Continue heparin infusion and statin.   EP    DATE OF SERVICE: 11-14-22    HISTORY OF PRESENT ILLNESS: HPI:  Patient is an 86 y/o Female with PMH of HTN who is admitted after a syncopal episode and found to have an elevated troponin. EP consulted for further evaluation. Patient states she was moving her car out of her garage, got out of the car and doesn't remember falling but waking up on the ground. She sustained abrasions to her lips and nose and chipped two teeth. She had no warning signs this time. No preceding dizziness, chest pain, SOB, palpitations, n/v, or abd pain. She walks 4 miles daily without chest pain or SOB. Pt reports one prior episode of LOC when she was diagnosed with a UTI years ago. States had a COVID booster dose 2 weeks ago.    PAST MEDICAL & SURGICAL HISTORY:  HTN (hypertension)  UTI (urinary tract infection), bacterial  2019 novel coronavirus disease (COVID-19)  in 2020 March  No significant past surgical history    MEDICATIONS:  MEDICATIONS  (STANDING):  atorvastatin 80 milliGRAM(s) Oral at bedtime  enalapril 2.5 milliGRAM(s) Oral daily  heparin  Infusion.  Unit(s)/Hr (6 mL/Hr) IV Continuous <Continuous>  metoprolol tartrate 25 milliGRAM(s) Oral two times a day      Allergies    penicillin (Unknown)    Intolerances    FAMILY HISTORY:  No pertinent family history in first degree relatives      Non-contributary for premature coronary disease or sudden cardiac death    SOCIAL HISTORY:    [x ] Non-smoker  [ ] Smoker  [ ] Alcohol    FLU VACCINE THIS YEAR STARTS IN AUGUST:  [ ] Yes    [ ] No    IF OVER 65 HAVE YOU EVER HAD A PNA VACCINE:  [ ] Yes    [ ] No       [ ] N/A      REVIEW OF SYSTEMS:  [ ]chest pain  [  ]shortness of breath  [  ]palpitations  [x  ]syncope  [ ]near syncope [ ]upper extremity weakness   [ ] lower extremity weakness  [  ]diplopia  [  ]altered mental status   [  ]fevers  [ ]chills [ ]nausea  [ ]vomiting  [  ]dysphagia    [ ]abdominal pain  [ ]melena  [ ]BRBPR    [  ]epistaxis  [  ]rash    [ ]lower extremity edema        [X] All others negative	  [ ] Unable to obtain      LABS:	 	    CARDIAC MARKERS:  CARDIAC MARKERS ( 14 Nov 2022 12:32 )  x     / x     / 123 U/L / x     / 14.7 ng/mL  CARDIAC MARKERS ( 14 Nov 2022 11:26 )  x     / x     / x     / x     / 11.0 ng/mL                              13.3   10.40 )-----------( 741      ( 14 Nov 2022 11:26 )             41.1     Hb Trend: 13.3<--    11-14    136  |  100  |  22  ----------------------------<  113<H>  4.0   |  25  |  0.52    Ca    10.0      14 Nov 2022 11:26    TPro  7.6  /  Alb  4.3  /  TBili  0.3  /  DBili  x   /  AST  28  /  ALT  23  /  AlkPhos  78  11-14    Creatinine Trend: 0.52<--    Coags:  PT/INR - ( 14 Nov 2022 11:26 )   PT: 12.7 sec;   INR: 1.10 ratio         PTT - ( 14 Nov 2022 11:26 )  PTT:28.4 sec    proBNP:   Lipid Profile:   HgA1c:   TSH:         PHYSICAL EXAM:  T(C): 36.5 (11-14-22 @ 13:31), Max: 36.5 (11-14-22 @ 09:53)  HR: 80 (11-14-22 @ 13:31) (80 - 88)  BP: 145/87 (11-14-22 @ 13:31) (145/87 - 176/86)  RR: 18 (11-14-22 @ 13:31) (16 - 18)  SpO2: 96% (11-14-22 @ 13:31) (96% - 97%)  Wt(kg): --   BMI (kg/m2): 18.8 (11-14-22 @ 12:30)  I&O's Summary      Gen: Appears well in NAD  HEENT:  (-)icterus (-)pallor  CV: N S1 S2 1/6 KARIS (+)2 Pulses B/l  Resp:  Clear to auscultation B/L, normal effort  GI: (+) BS Soft, NT, ND  Lymph:  (-)Edema, (-)obvious lymphadenopathy  Skin: Warm to touch, Normal turgor  Psych: Appropriate mood and affect      TELEMETRY: NSR 70s	      ECG: NSR, narrow QRS, diffuse ST depressions, aVR and aVL with 1mm ST elevation 	    RADIOLOGY:         CXR:     ASSESSMENT/PLAN: Patient is an 86 y/o Female with PMH of HTN who is admitted after a syncopal episode and found to have an elevated troponin. EP consulted for further evaluation.    - Monitor telemetry for arrythmia given syncope without prodrome  - Check TTE and orthostatics  - AC with hep gtt per primary team/cardio while workup pending (f/u d-dimer results)  - Follow up TSH  - Continue Enalapril and Metoprolol for HTN  - Ischemic evaluation per cardiology given abnormal EKG and elevated troponin however no anginal symptoms  - Further recs pending above    Felix Moreno PA-C  Pager: [phone]     Attending Addendum:  88yo woman with new, unexplained syncopal episode occuring while getting up out of her car.  Hx of hypertension.  Admitting diagnosis is NSTEMI.    Syncope - Outside of other labwork and EKG changes, would consider orthostatic hypotension as her collapse occurred rapidly without identifiable prodrome.  Hypertension- continue home dose of enalapril.  NSTEMI- positive troponin x 2 in the setting of normal kidney function, and ischemic-looking EKG (ST depression in II,III,aVF, V4-V6, and 1mm ST elevation in aVR and aVL).  Echo verbally reported with wall motion abnormalities. Follow up on formal read.  Continue heparin infusion and statin.  Rule out pulmonary embolus - order CTA.  will follow up results in morning while on heparin infusion.

## 2022-11-14 NOTE — ED ADULT TRIAGE NOTE - CHIEF COMPLAINT QUOTE
unwitnessed fall this morning outside, does not remember the fall but remembers getting up from ground, felt a little dizzy but resolved quickly, and ambulated into her apartment  abrasion to nose and lip, takes aspirin daily

## 2022-11-15 DIAGNOSIS — I26.99 OTHER PULMONARY EMBOLISM WITHOUT ACUTE COR PULMONALE: ICD-10-CM

## 2022-11-15 LAB
A1C WITH ESTIMATED AVERAGE GLUCOSE RESULT: 5.7 % — HIGH (ref 4–5.6)
ALBUMIN SERPL ELPH-MCNC: 3.4 G/DL — SIGNIFICANT CHANGE UP (ref 3.3–5)
ALP SERPL-CCNC: 68 U/L — SIGNIFICANT CHANGE UP (ref 40–120)
ALT FLD-CCNC: 22 U/L — SIGNIFICANT CHANGE UP (ref 10–45)
ANION GAP SERPL CALC-SCNC: 12 MMOL/L — SIGNIFICANT CHANGE UP (ref 5–17)
APTT BLD: 63.3 SEC — HIGH (ref 27.5–35.5)
APTT BLD: 65.1 SEC — HIGH (ref 27.5–35.5)
AST SERPL-CCNC: 34 U/L — SIGNIFICANT CHANGE UP (ref 10–40)
BILIRUB SERPL-MCNC: 0.4 MG/DL — SIGNIFICANT CHANGE UP (ref 0.2–1.2)
BUN SERPL-MCNC: 17 MG/DL — SIGNIFICANT CHANGE UP (ref 7–23)
CALCIUM SERPL-MCNC: 9.3 MG/DL — SIGNIFICANT CHANGE UP (ref 8.4–10.5)
CHLORIDE SERPL-SCNC: 104 MMOL/L — SIGNIFICANT CHANGE UP (ref 96–108)
CHOLEST SERPL-MCNC: 175 MG/DL — SIGNIFICANT CHANGE UP
CO2 SERPL-SCNC: 21 MMOL/L — LOW (ref 22–31)
CREAT SERPL-MCNC: 0.66 MG/DL — SIGNIFICANT CHANGE UP (ref 0.5–1.3)
EGFR: 86 ML/MIN/1.73M2 — SIGNIFICANT CHANGE UP
ESTIMATED AVERAGE GLUCOSE: 117 MG/DL — HIGH (ref 68–114)
GLUCOSE SERPL-MCNC: 95 MG/DL — SIGNIFICANT CHANGE UP (ref 70–99)
HCT VFR BLD CALC: 35.1 % — SIGNIFICANT CHANGE UP (ref 34.5–45)
HCT VFR BLD CALC: 35.5 % — SIGNIFICANT CHANGE UP (ref 34.5–45)
HCT VFR BLD CALC: 38.1 % — SIGNIFICANT CHANGE UP (ref 34.5–45)
HDLC SERPL-MCNC: 61 MG/DL — SIGNIFICANT CHANGE UP
HGB BLD-MCNC: 11.4 G/DL — LOW (ref 11.5–15.5)
HGB BLD-MCNC: 11.4 G/DL — LOW (ref 11.5–15.5)
HGB BLD-MCNC: 12.2 G/DL — SIGNIFICANT CHANGE UP (ref 11.5–15.5)
LACTATE SERPL-SCNC: 1 MMOL/L — SIGNIFICANT CHANGE UP (ref 0.5–2)
LIPID PNL WITH DIRECT LDL SERPL: 102 MG/DL — HIGH
MAGNESIUM SERPL-MCNC: 1.8 MG/DL — SIGNIFICANT CHANGE UP (ref 1.6–2.6)
MCHC RBC-ENTMCNC: 29.4 PG — SIGNIFICANT CHANGE UP (ref 27–34)
MCHC RBC-ENTMCNC: 29.5 PG — SIGNIFICANT CHANGE UP (ref 27–34)
MCHC RBC-ENTMCNC: 29.5 PG — SIGNIFICANT CHANGE UP (ref 27–34)
MCHC RBC-ENTMCNC: 32 GM/DL — SIGNIFICANT CHANGE UP (ref 32–36)
MCHC RBC-ENTMCNC: 32.1 GM/DL — SIGNIFICANT CHANGE UP (ref 32–36)
MCHC RBC-ENTMCNC: 32.5 GM/DL — SIGNIFICANT CHANGE UP (ref 32–36)
MCV RBC AUTO: 90.5 FL — SIGNIFICANT CHANGE UP (ref 80–100)
MCV RBC AUTO: 92 FL — SIGNIFICANT CHANGE UP (ref 80–100)
MCV RBC AUTO: 92 FL — SIGNIFICANT CHANGE UP (ref 80–100)
NON HDL CHOLESTEROL: 114 MG/DL — SIGNIFICANT CHANGE UP
NRBC # BLD: 0 /100 WBCS — SIGNIFICANT CHANGE UP (ref 0–0)
PHOSPHATE SERPL-MCNC: 2.8 MG/DL — SIGNIFICANT CHANGE UP (ref 2.5–4.5)
PLATELET # BLD AUTO: 602 K/UL — HIGH (ref 150–400)
PLATELET # BLD AUTO: 621 K/UL — HIGH (ref 150–400)
PLATELET # BLD AUTO: 634 K/UL — HIGH (ref 150–400)
POTASSIUM SERPL-MCNC: 4.2 MMOL/L — SIGNIFICANT CHANGE UP (ref 3.5–5.3)
POTASSIUM SERPL-SCNC: 4.2 MMOL/L — SIGNIFICANT CHANGE UP (ref 3.5–5.3)
PROT SERPL-MCNC: 6.3 G/DL — SIGNIFICANT CHANGE UP (ref 6–8.3)
RBC # BLD: 3.86 M/UL — SIGNIFICANT CHANGE UP (ref 3.8–5.2)
RBC # BLD: 3.88 M/UL — SIGNIFICANT CHANGE UP (ref 3.8–5.2)
RBC # BLD: 4.14 M/UL — SIGNIFICANT CHANGE UP (ref 3.8–5.2)
RBC # FLD: 14.6 % — HIGH (ref 10.3–14.5)
RBC # FLD: 14.7 % — HIGH (ref 10.3–14.5)
RBC # FLD: 14.7 % — HIGH (ref 10.3–14.5)
SODIUM SERPL-SCNC: 137 MMOL/L — SIGNIFICANT CHANGE UP (ref 135–145)
TRIGL SERPL-MCNC: 62 MG/DL — SIGNIFICANT CHANGE UP
TROPONIN T, HIGH SENSITIVITY RESULT: 165 NG/L — HIGH (ref 0–51)
TSH SERPL-MCNC: 2.48 UIU/ML — SIGNIFICANT CHANGE UP (ref 0.27–4.2)
WBC # BLD: 6.77 K/UL — SIGNIFICANT CHANGE UP (ref 3.8–10.5)
WBC # BLD: 7.52 K/UL — SIGNIFICANT CHANGE UP (ref 3.8–10.5)
WBC # BLD: 8.46 K/UL — SIGNIFICANT CHANGE UP (ref 3.8–10.5)
WBC # FLD AUTO: 6.77 K/UL — SIGNIFICANT CHANGE UP (ref 3.8–10.5)
WBC # FLD AUTO: 7.52 K/UL — SIGNIFICANT CHANGE UP (ref 3.8–10.5)
WBC # FLD AUTO: 8.46 K/UL — SIGNIFICANT CHANGE UP (ref 3.8–10.5)

## 2022-11-15 PROCEDURE — 93970 EXTREMITY STUDY: CPT | Mod: 26

## 2022-11-15 PROCEDURE — 95816 EEG AWAKE AND DROWSY: CPT | Mod: 26

## 2022-11-15 PROCEDURE — 71275 CT ANGIOGRAPHY CHEST: CPT | Mod: 26

## 2022-11-15 PROCEDURE — 93010 ELECTROCARDIOGRAM REPORT: CPT

## 2022-11-15 RX ORDER — HEPARIN SODIUM 5000 [USP'U]/ML
2000 INJECTION INTRAVENOUS; SUBCUTANEOUS EVERY 6 HOURS
Refills: 0 | Status: DISCONTINUED | OUTPATIENT
Start: 2022-11-15 | End: 2022-11-17

## 2022-11-15 RX ORDER — HEPARIN SODIUM 5000 [USP'U]/ML
INJECTION INTRAVENOUS; SUBCUTANEOUS
Qty: 25000 | Refills: 0 | Status: DISCONTINUED | OUTPATIENT
Start: 2022-11-15 | End: 2022-11-17

## 2022-11-15 RX ORDER — HEPARIN SODIUM 5000 [USP'U]/ML
4000 INJECTION INTRAVENOUS; SUBCUTANEOUS EVERY 6 HOURS
Refills: 0 | Status: DISCONTINUED | OUTPATIENT
Start: 2022-11-15 | End: 2022-11-17

## 2022-11-15 RX ORDER — SODIUM CHLORIDE 9 MG/ML
500 INJECTION INTRAMUSCULAR; INTRAVENOUS; SUBCUTANEOUS ONCE
Refills: 0 | Status: COMPLETED | OUTPATIENT
Start: 2022-11-15 | End: 2022-11-15

## 2022-11-15 RX ORDER — BACITRACIN ZINC 500 UNIT/G
1 OINTMENT IN PACKET (EA) TOPICAL EVERY 12 HOURS
Refills: 0 | Status: DISCONTINUED | OUTPATIENT
Start: 2022-11-15 | End: 2022-11-17

## 2022-11-15 RX ADMIN — Medication 1 APPLICATION(S): at 05:58

## 2022-11-15 RX ADMIN — HEPARIN SODIUM 900 UNIT(S)/HR: 5000 INJECTION INTRAVENOUS; SUBCUTANEOUS at 03:09

## 2022-11-15 RX ADMIN — HEPARIN SODIUM 900 UNIT(S)/HR: 5000 INJECTION INTRAVENOUS; SUBCUTANEOUS at 21:34

## 2022-11-15 RX ADMIN — SODIUM CHLORIDE 500 MILLILITER(S): 9 INJECTION INTRAMUSCULAR; INTRAVENOUS; SUBCUTANEOUS at 03:10

## 2022-11-15 RX ADMIN — HEPARIN SODIUM 900 UNIT(S)/HR: 5000 INJECTION INTRAVENOUS; SUBCUTANEOUS at 12:24

## 2022-11-15 RX ADMIN — Medication 25 MILLIGRAM(S): at 21:34

## 2022-11-15 RX ADMIN — Medication 81 MILLIGRAM(S): at 12:25

## 2022-11-15 RX ADMIN — Medication 650 MILLIGRAM(S): at 00:19

## 2022-11-15 RX ADMIN — HEPARIN SODIUM 900 UNIT(S)/HR: 5000 INJECTION INTRAVENOUS; SUBCUTANEOUS at 03:33

## 2022-11-15 RX ADMIN — Medication 1 APPLICATION(S): at 18:01

## 2022-11-15 RX ADMIN — ATORVASTATIN CALCIUM 80 MILLIGRAM(S): 80 TABLET, FILM COATED ORAL at 21:35

## 2022-11-15 NOTE — PHYSICAL THERAPY INITIAL EVALUATION ADULT - GENERAL OBSERVATIONS, REHAB EVAL
pt received in bed nad toprails up and 1 siderail HOB 30 bed in lowest poisition call bell,phone and table in reach 3 attempts to see with md staff x2 several min ; PT return pt feeling eugene Heparin drip can see per NP Homero Chiu

## 2022-11-15 NOTE — DIETITIAN INITIAL EVALUATION ADULT - REASON INDICATOR FOR ASSESSMENT
Nutrition assessment warranted for BMI<19  Information obtained from: electronic medical record and patient  Chart reviewed, events noted.

## 2022-11-15 NOTE — PHYSICAL THERAPY INITIAL EVALUATION ADULT - GAIT DEVIATIONS NOTED, PT EVAL
balance fair/decreased marcia/increased time in double stance/decreased step length/decreased stride length/decreased weight-shifting ability

## 2022-11-15 NOTE — PHYSICAL THERAPY INITIAL EVALUATION ADULT - IMPAIRED TRANSFERS: SIT/STAND, REHAB EVAL
pt balance fair on iv heparin drip pt educ re bleeding precaution and tohave someione with her when get up pt understood

## 2022-11-15 NOTE — CHART NOTE - NSCHARTNOTEFT_GEN_A_CORE
Medicine NP note    F/U CTA to R/O PE  Signed out by day  team to F.U CTA to r/o PE and d/c heparin gtt if CTA result with no PE  Expedited CTA, patient s/p CTA now, spoke to Radiologist, awaiting for CTA results  Will continue to monitor    Jean-Pierre Bailon [facility] [place]  [id number]. Medicine NP note    F/U CTA to R/O PE  Signed out by day  team to F.U CTA to r/o PE and d/c heparin gtt if CTA result with no PE  Expedited CTA, patient s/p CTA now, spoke to Radiologist, awaiting for CTA results  Will continue to monitor  Please see author's note for jennifer Bailon [facility] [place]  [phone].

## 2022-11-15 NOTE — PATIENT PROFILE ADULT - AGENT'S NAME
Danielle Pop Nsaids Counseling: NSAID Counseling: I discussed with the patient that NSAIDs should be taken with food. Prolonged use of NSAIDs can result in the development of stomach ulcers.  Patient advised to stop taking NSAIDs if abdominal pain occurs.  The patient verbalized understanding of the proper use and possible adverse effects of NSAIDs.  All of the patient's questions and concerns were addressed.

## 2022-11-15 NOTE — CONSULT NOTE ADULT - ASSESSMENT
86yo  F with HTN presents after an unwitnessed fall. Pt states she was moving her car out of her garage, got out of the car and doesn't remember falling. She sustained abrasions to her lips and nose. After a few minutes she remembers walking to the garage door and then having a conversation with a passer by. She does remember going back to her apartment. Pt doesn't remember if she hit her head, but hit her mouth and chipped two teeth. She takes ASA daily.  covid booster 2 weeks ago.   + LOC, no tongue bite, no convulsions, no incontinence   CTH and C spine unremarkable   CTA chest + PE   dimer 395   trops in 400s     Impression: Syncope may have been related to PE.  PE possible 2/2 COVID booster? hypercoaguability ; doubt seizure   - heparin drip for PE   - check orthostaitcs   - cardio for NSTEMI. heparin, asa, BB and statin   - trend troponins  - rEEG in or outpatient   - should have some sort of vessel imaging, CTA H/N or CD.    - Hemoglobin A1c and lipid panel  - TTE  - telemetry  - PT/OT/SS/SLP, OOBC  - check FS, glucose control <180  - GI/DVT ppx  - Counseling on diet, exercise, and medication adherence was done  - Counseling on smoking cessation and alcohol consumption offered when appropriate.  - Pain assessed and judicious use of narcotics when appropriate was discussed.    - Stroke education given when appropriate.  - Importance of fall prevention discussed.   - Differential diagnosis and plan of care discussed with patient and/or family and primary team  - Thank you for allowing me to participate in the care of this patient. Call with questions.   Wesley Ritter MD  Vascular Neurology  Office: [phone]

## 2022-11-15 NOTE — EEG REPORT - NS EEG TEXT BOX
REPORT OF ROUTINE EEG WITH VIDEO  [facility]: [address], [address] [place] [address], Phone: [phone] [facility]: [address] [place] [address], Phone: [phone] Office: [address] [place] [address], [place] [address], Phone: [phone]  Patient Name: Cydney Powell   Age: 87 year : 1935 MRN #: -, Location: - Referring Physician: -  EEG #: [id number] Study Date: 11/15/2022   Start Time: 12:17:23 PM    Study Duration: 23.1 		  Technical Information:					 On Instrument: [id number] Placement and Labeling of Electrodes: The EEG was performed utilizing 20 channels referential EEG connections (coronal over temporal over parasagittal montage) using all standard 10-20 electrode placements with EKG.  Recording was at a sampling rate of 256 samples per second per channel.  Time synchronized digital video recording was done simultaneously with EEG recording.  A low light infrared camera was used for low light recording.  Dilan and seizure detection algorithms were utilized. CSA Technical Component: Quantitative EEG analysis using a separate Compressed Spectral Array (CSA) software package was conducted in real-time and run at bedside after set up by the technician, digitally displaying the power of electrographic frequencies included in the 1-30Hz band using a graded color map.  This data was reviewed and interpreted independently, and is reported in a separate section below.  History:  routine eeg preformed at bedside COR: awake and alert no hv due to covid saftey precaution photi-compeleted 88 y/o female pmhx- UTI, covid 19, HTN p/w syncope   Medication lopressor vasotec lipitor aspirin  [Abbreviation Key:  PDR=alpha rhythm/posterior dominant rhythm. A-P=anterior posterior.  Amplitude: ‘very low’:<20; ‘low’:20-49; ‘medium’:; ‘high’:>150uV.  Persistence for periodic/rhythmic patterns (% of epoch) ‘rare’:<1%; ‘occasional’:1-10%; ‘frequent’:10-50%; ‘abundant’:50-90%; ‘continuous’:>90%.  Persistence for sporadic discharges: ‘rare’:<1/hr; ‘occasional’:1/min-1/hr; ‘frequent’:>1/min; ‘abundant’:>1/10 sec.  RPP=rhythmic and periodic patterns; GRDA=generalized rhythmic delta activity; FIRDA=frontal intermittent GRDA; LRDA=lateralized rhythmic delta activity; TIRDA=temporal intermittent rhythmic delta activity;  LPD=PLED=lateralized periodic discharges; GPD=generalized periodic discharges; BIPDs =bilateral independent periodic discharges; Mf=multifocal; SIRPDs=stimulus induced rhythmic, periodic, or ictal appearing discharges; BIRDs=brief potentially ictal rhythmic discharges >4 Hz, lasting .5-10s; PFA (paroxysmal bursts >13 Hz or =8 Hz <10s).  Modifiers: +F=with fast component; +S=with spike component; +R=with rhythmic component.  S-B=burst suppression pattern.  Max=maximal. N1-drowsy; N2-stage II sleep; N3-slow wave sleep. SSS/BETS=small sharp spikes/benign epileptiform transients of sleep. HV=hyperventilation; PS=photic stimulation]  Study Interpretation:  FINDINGS:    Background: Symmetry: symmetric Continuous: continuous PDR: symmetric, well-modulated 9.5 Hz activity, with amplitude to 40 uV, that attenuated to eye opening.  Low amplitude frontal beta noted in wakefulness. Reactivity: present Voltage: normal, mostly 20-150uV Anterior Posterior Gradient: present Breach: absent  Background Slowing: Generalized slowing: none was present. Focal slowing: none was present.  State Changes:  Drowsiness recorded however limited  Stage II sleep transients were not recorded.  Sporadic Epileptiform Discharges:  None  Rhythmic and Periodic Patterns (RPPs): None   Electrographic and Electroclinical seizures: None  Other Clinical Events: None  Activation Procedures:  -Hyperventilation was not performed.   -Photic stimulation was performed and did not elicit any abnormalities.     Artifacts: Intermittent myogenic and movement artifacts were noted.  ECG: The heart rate on single channel ECG was predominantly between 60-70 BPM.  EEG Classification / Summary:  Normal EEG awake / drowsy   Clinical Impression: Findings indicate:  Normal EEG awake / drowsy  No epileptiform pattern or seizure seen.  Roel Richadrson MD Epilepsy Fellow , [facility] Department of Neurology, [facility]  [facility] EEG Reading Room Ph#: [phone] Epilepsy Answering Service after 5PM and before 8:30AM: Ph#: [phone]    REPORT OF ROUTINE EEG WITH VIDEO  [facility]: [address], [address] [place] [address], Phone: [phone] [facility]: [address] [place] [address], Phone: [phone] Office: [address] [place] [address], [place] [address], Phone: [phone]  Patient Name: Cydney Powell   Age: 87 year : 1935 MRN #: -, Location: - Referring Physician: -  EEG #: [id number] Study Date: 11/15/2022   Start Time: 12:17:23 PM    Study Duration: 23.1 		  Technical Information:					 On Instrument: [id number] Placement and Labeling of Electrodes: The EEG was performed utilizing 20 channels referential EEG connections (coronal over temporal over parasagittal montage) using all standard 10-20 electrode placements with EKG.  Recording was at a sampling rate of 256 samples per second per channel.  Time synchronized digital video recording was done simultaneously with EEG recording.  A low light infrared camera was used for low light recording.  Dilan and seizure detection algorithms were utilized. CSA Technical Component: Quantitative EEG analysis using a separate Compressed Spectral Array (CSA) software package was conducted in real-time and run at bedside after set up by the technician, digitally displaying the power of electrographic frequencies included in the 1-30Hz band using a graded color map.  This data was reviewed and interpreted independently, and is reported in a separate section below.  History:  routine eeg preformed at bedside COR: awake and alert no hv due to covid saftey precaution photi-compeleted 88 y/o female pmhx- UTI, covid 19, HTN p/w syncope   Medication lopressor vasotec lipitor aspirin  [Abbreviation Key:  PDR=alpha rhythm/posterior dominant rhythm. A-P=anterior posterior.  Amplitude: ‘very low’:<20; ‘low’:20-49; ‘medium’:; ‘high’:>150uV.  Persistence for periodic/rhythmic patterns (% of epoch) ‘rare’:<1%; ‘occasional’:1-10%; ‘frequent’:10-50%; ‘abundant’:50-90%; ‘continuous’:>90%.  Persistence for sporadic discharges: ‘rare’:<1/hr; ‘occasional’:1/min-1/hr; ‘frequent’:>1/min; ‘abundant’:>1/10 sec.  RPP=rhythmic and periodic patterns; GRDA=generalized rhythmic delta activity; FIRDA=frontal intermittent GRDA; LRDA=lateralized rhythmic delta activity; TIRDA=temporal intermittent rhythmic delta activity;  LPD=PLED=lateralized periodic discharges; GPD=generalized periodic discharges; BIPDs =bilateral independent periodic discharges; Mf=multifocal; SIRPDs=stimulus induced rhythmic, periodic, or ictal appearing discharges; BIRDs=brief potentially ictal rhythmic discharges >4 Hz, lasting .5-10s; PFA (paroxysmal bursts >13 Hz or =8 Hz <10s).  Modifiers: +F=with fast component; +S=with spike component; +R=with rhythmic component.  S-B=burst suppression pattern.  Max=maximal. N1-drowsy; N2-stage II sleep; N3-slow wave sleep. SSS/BETS=small sharp spikes/benign epileptiform transients of sleep. HV=hyperventilation; PS=photic stimulation]  Study Interpretation:  FINDINGS:    Background: Symmetry: symmetric Continuous: continuous PDR: symmetric, well-modulated 9.5 Hz activity, with amplitude to 40 uV, that attenuated to eye opening.  Low amplitude frontal beta noted in wakefulness. Reactivity: present Voltage: normal, mostly 20-150uV Anterior Posterior Gradient: present Breach: absent  Background Slowing: Generalized slowing: none was present. Focal slowing: none was present.  State Changes:  Drowsiness recorded however limited  Stage II sleep transients were not recorded.  Sporadic Epileptiform Discharges:  None  Rhythmic and Periodic Patterns (RPPs): None   Electrographic and Electroclinical seizures: None  Other Clinical Events: None  Activation Procedures:  -Hyperventilation was not performed.   -Photic stimulation was performed and did not elicit any abnormalities.     Artifacts: Intermittent myogenic and movement artifacts were noted.  ECG: The heart rate on single channel ECG was predominantly between 60-70 BPM.  EEG Classification / Summary:  Normal EEG awake / drowsy   Clinical Impression: Findings indicate:  Normal EEG awake / drowsy  No epileptiform pattern or seizure seen.  Roel Richardson MD Epilepsy Fellow , [facility] Department of Neurology, [facility]  Stanley Lewis MD PhD Director, Epilepsy Division, [facility] EEG Reading Room Ph#: [phone] Epilepsy Answering Service after 5PM and before 8:30AM: Ph#: [phone]

## 2022-11-15 NOTE — DIETITIAN INITIAL EVALUATION ADULT - PERTINENT LABORATORY DATA
11-15    137  |  104  |  17  ----------------------------<  95  4.2   |  21<L>  |  0.66    Ca    9.3      15 Nov 2022 08:04  Phos  2.8     11-15  Mg     1.8     11-15    TPro  6.3  /  Alb  3.4  /  TBili  0.4  /  DBili  x   /  AST  34  /  ALT  22  /  AlkPhos  68  11-15

## 2022-11-15 NOTE — PHYSICAL THERAPY INITIAL EVALUATION ADULT - PLANNED THERAPY INTERVENTIONS, PT EVAL
goal stair train : p will negotiate a flight of steps with HR independent 1wk/balance training/gait training/transfer training

## 2022-11-15 NOTE — CONSULT NOTE ADULT - SUBJECTIVE AND OBJECTIVE BOX
Neurology Consult    Reason for Consult: Patient is a 87y old  Female who presents with a chief complaint of syncope and fall (2022 15:38)      HPI:  87yoF PMH HTN presents after an unwitnessed fall. Pt states she was moving her car out of her garage, got out of the car and doesn't remember falling. She sustained abrasions to her lips and nose. After a few minutes she remembers walking to the garage door and then having a conversation with a passer by. She does remember going back to her apartment. Pt doesn't remember if she hit her head, but hit her mouth and chipped two teeth. She takes ASA daily. Denies preceding dizziness, chest pain, SOB, abdominal pain N/V/D, dysuria, hematuria. Pt reports one prior episode of LOC when she was diagnosed with a UTI a while back. States had a COVID booster dose 2 weeks ago. (2022 13:22)       PAST MEDICAL & SURGICAL HISTORY:  HTN (hypertension)      UTI (urinary tract infection), bacterial      2019 novel coronavirus disease (COVID-19)  in 2020      No significant past surgical history          Allergies: Allergies    penicillin (Unknown)    Intolerances        Social History: Denies toxic habits including tobacco, ETOH or illicit drugs.    Family History: FAMILY HISTORY:  No pertinent family history in first degree relatives    . No family history of strokes    Medications: MEDICATIONS  (STANDING):  aspirin enteric coated 81 milliGRAM(s) Oral daily  atorvastatin 80 milliGRAM(s) Oral at bedtime  bacitracin   Ointment 1 Application(s) Topical every 12 hours  enalapril 2.5 milliGRAM(s) Oral daily  heparin  Infusion.  Unit(s)/Hr (9 mL/Hr) IV Continuous <Continuous>  metoprolol tartrate 25 milliGRAM(s) Oral two times a day    MEDICATIONS  (PRN):  heparin   Injectable 4000 Unit(s) IV Push every 6 hours PRN For aPTT less than 40  heparin   Injectable 2000 Unit(s) IV Push every 6 hours PRN For aPTT between 40 - 57  psyllium Powder 1 Packet(s) Oral two times a day PRN constipation      Review of Systems:  CONSTITUTIONAL:  No weight loss, fever, chills, weakness or fatigue.  HEENT:  Eyes:  No visual loss, blurred vision, double vision or yellow sclera. Ears, Nose, Throat:  No hearing loss, sneezing, congestion, runny nose or sore throat.  SKIN:  No rash or itching.  CARDIOVASCULAR:  No chest pain, chest pressure or chest discomfort. No palpitations or edema.  RESPIRATORY:  No shortness of breath, cough or sputum.  GASTROINTESTINAL:  No anorexia, nausea, vomiting or diarrhea. No abdominal pain or blood.  GENITOURINARY:  No burning on urination or incontinence   NEUROLOGICAL:  No headache, dizziness,+  syncope, paralysis, ataxia, numbness or tingling in the extremities. No change in bowel or bladder control. no limb weakness. no vision changes.   MUSCULOSKELETAL:  No muscle, back pain, joint pain or stiffness.  HEMATOLOGIC:  No anemia, bleeding or bruising.  LYMPHATICS:  No enlarged nodes. No history of splenectomy.  PSYCHIATRIC:  No history of depression or anxiety.  ENDOCRINOLOGIC:  No reports of sweating, cold or heat intolerance. No polyuria or polydipsia.      Vitals:  Vital Signs Last 24 Hrs  T(C): 36.8 (15 Nov 2022 05:34), Max: 36.8 (15 Nov 2022 05:34)  T(F): 98.2 (15 Nov 2022 05:34), Max: 98.2 (15 Nov 2022 05:34)  HR: 57 (15 Nov 2022 05:34) (55 - 80)  BP: 98/61 (15 Nov 2022 05:34) (94/48 - 145/87)  BP(mean): --  RR: 18 (15 Nov 2022 05:34) (18 - 20)  SpO2: 100% (15 Nov 2022 05:34) (96% - 100%)    Orthostatic VS    Parameters below as of 15 Nov 2022 05:34  Patient On (Oxygen Delivery Method): room air        General Exam:   General Appearance: Appropriately dressed and in no acute distress       Head: Normocephalic, atraumatic and no dysmorphic features  Ear, Nose, and Throat: Moist mucous membranes  CVS: S1S2+  Resp: No SOB, no wheeze or rhonchi  GI: soft NT/ND  Extremities: No edema or cyanosis  Skin: No bruises or rashes     Neurological Exam:  Mental Status: Awake, alert and oriented x 3.  Able to follow simple and complex verbal commands. Able to name and repeat. fluent speech. No obvious aphasia or dysarthria noted.   Cranial Nerves: PERRL, EOMI, VFFC, sensation V1-V3 intact,  no obvious facial asymmetry, equal elevation of palate, scm/trap 5/5, tongue is midline on protrusion. no obvious papilledema on fundoscopic exam. hearing is grossly intact.   Motor: Normal bulk, tone and strength throughout. Fine finger movements were intact and symmetric. no tremors or drift noted.    Sensation: Intact to light touch and pinprick throughout. no right/left confusion. no extinction to tactile on DSS. Romberg was negative.   Reflexes: 1+ throughout at biceps, brachioradialis, triceps, patellars and ankles bilaterally and equal. No clonus. R toe and L toe were both downgoing.  Coordination: No dysmetria on FNF or HKS  Gait: deferred     Data/Labs/Imaging which I personally reviewed.     Labs:     CBC Full  -  ( 15 Nov 2022 08:04 )  WBC Count : 7.52 K/uL  RBC Count : 3.86 M/uL  Hemoglobin : 11.4 g/dL  Hematocrit : 35.5 %  Platelet Count - Automated : 602 K/uL  Mean Cell Volume : 92.0 fl  Mean Cell Hemoglobin : 29.5 pg  Mean Cell Hemoglobin Concentration : 32.1 gm/dL  Auto Neutrophil # : x  Auto Lymphocyte # : x  Auto Monocyte # : x  Auto Eosinophil # : x  Auto Basophil # : x  Auto Neutrophil % : x  Auto Lymphocyte % : x  Auto Monocyte % : x  Auto Eosinophil % : x  Auto Basophil % : x    11-15    137  |  104  |  17  ----------------------------<  95  4.2   |  21<L>  |  0.66    Ca    9.3      15 Nov 2022 08:04  Phos  2.8     11-15  Mg     1.8     11-15    TPro  6.3  /  Alb  3.4  /  TBili  0.4  /  DBili  x   /  AST  34  /  ALT  22  /  AlkPhos  68  11-15    LIVER FUNCTIONS - ( 15 Nov 2022 08:04 )  Alb: 3.4 g/dL / Pro: 6.3 g/dL / ALK PHOS: 68 U/L / ALT: 22 U/L / AST: 34 U/L / GGT: x           PT/INR - ( 2022 11:26 )   PT: 12.7 sec;   INR: 1.10 ratio         PTT - ( 15 Nov 2022 03:07 )  PTT:63.3 sec  Urinalysis Basic - ( 2022 20:58 )    Color: Light Yellow / Appearance: Clear / S.010 / pH: x  Gluc: x / Ketone: Negative  / Bili: Negative / Urobili: Negative   Blood: x / Protein: Negative / Nitrite: Negative   Leuk Esterase: Small / RBC: 0 /hpf / WBC 3 /HPF   Sq Epi: x / Non Sq Epi: 0 /hpf / Bacteria: Negative    < from: CT Cervical Spine No Cont (22 @ 11:44) >    ACC: [id number] EXAM:  CT CERVICAL SPINE                        ACC: [id number] EXAM:  CT BRAIN                          PROCEDURE DATE:  2022          INTERPRETATION:  CLINICAL STATEMENT: Trauma..    TECHNIQUE: CT of the head and cervical spine were performed without IV   contrast. 3-D/MIP images obtained    COMPARISON: None.    FINDINGS:  Head:  There are areas of low attenuation in the periventricular white matter   likely related to mild chronic microvascular ischemic changes.    There isno acute intracranial hemorrhage, parenchymal mass, mass effect   or midline shift. There is no acute territorial infarct. There is no   hydrocephalus.    The cranium is intact. The visualized paranasal sinuses are well-aerated.    Cervical spine:  Vertebral body heights intact. Grade 1 anterolisthesis C3 on C4, C4 on C5.    There is no prevertebral soft tissue swelling. The odontoid is intact.    Evaluation of the spinal canal is limited on a CT exam.  Multilevel   degenerative changes noted resulting in multilevel spinal canal stenosis   and neural foraminal narrowing.    Heterogeneous thyroid gland noted.    IMPRESSION:  No acute intracranial hemorrhage    No acute fracture cervical spine. If pain persists, follow-up MRI exam   recommended    --- End of Report ---            JUDY ALEXANDER MD; Attending Radiologist  This document has been electronically signed. 2022 11:55AM    < end of copied text >

## 2022-11-15 NOTE — DIETITIAN INITIAL EVALUATION ADULT - ADD RECOMMEND
1) Continue current diet order as ordered/ tolerated.   2) Encourage adequate intake of meals to optimize PO intake.   3) Honor food preferences as able.   4) Monitor PO intake/tolerance, weights, labs, hydration status, bowels, and skin integrity.  5) BMI <!9 notification risk notification sent

## 2022-11-15 NOTE — PHYSICAL THERAPY INITIAL EVALUATION ADULT - ADDITIONAL COMMENTS
pt lives in apt no steps to negotiate , pt lives alone ;pt has a walk-in shower ; pt independent in adl's and fxl mobility w/o AD 's ; pt did not ID caregiver but has son Sal emergency contact [phone] ;pt has a blodd pressure unit at home and uses independent; pt walks 4 miles every day

## 2022-11-15 NOTE — CHART NOTE - NSCHARTNOTEFT_GEN_A_CORE
Medicine NP note    CC: Borderline BP, Bradycardia  Notified by RN that patient with SBp in 90's to low 100's and pta symptomatic    Vital Signs Last 24 Hrs  T(C): 36.1 (15 Nov 2022 02:00), Max: 36.7 (14 Nov 2022 23:59)  T(F): 97 (15 Nov 2022 02:00), Max: 98 (14 Nov 2022 23:59)  HR: 55 (15 Nov 2022 02:00) (55 - 88)  BP: 102/52 (15 Nov 2022 02:26) (94/48 - 176/86)  BP(mean): --  RR: 18 (15 Nov 2022 02:00) (16 - 20)  SpO2: 96% (15 Nov 2022 02:00) (96% - 97%)    Parameters below as of 15 Nov 2022 02:00  Patient On (Oxygen Delivery Method): room air                              13.3   10.40 )-----------( 741      ( 14 Nov 2022 11:26 )             41.1       A/P    87yoF PMH HTN presents after an unwitnessed fall. Pt states she was moving her car out of her garage, got out of the car and doesn't remember falling or getting back to her apartment. Pt doesn't remember if she hit her head, but hit her mouth and chipped two teeth. She takes ASA daily. Denies dizziness, chest pain, SOB, abdominal pain N/V/D, dysuria, hematuria. Pt reports one prior episode of LOC when she was diagnosed with a UTI.  DX: Syncope, Elevated troponin  - on telemetry, continue monitoring   - 11/14 labs: troponin 384 --> 485 --> 475-> 385 , CKMB 11.0--> 14.7, D-Dimer 395  Trop peaked already  On aspirin, Heparin gtt, Empirically treating PE   Now with soft BP    Borderline BP/ Bradycardia  Pt asymptomatic, afebrile, not hypoxic  Stat CBc, Ptt, lactic ordered  patient on enalapril and BB? causing Bp trending down  12 lead EKG ordedred in setting of new bradycardia  NS 500ml iv bolus ordered  Check Bp post ivf bolus administration  Trend vitals  Will hold am BB, enalapril  F/U cardiology am          #PE  Call received froM Radiologist that CTA positive for small PE  Pat already on heparin gtt  Will continue heparin gtt, changed to Full AC normogram in setting of PE   Trend CBC, ptt  F/U Cardiology am    Will discuss with Attending  Signed out to covering provider    Jean-Pierre Bailon FNP BC  [id number] Medicine NP note    CC: Borderline BP, Bradycardia  Notified by RN that patient with SBp in 90's to low 100's and pta symptomatic    Vital Signs Last 24 Hrs  T(C): 36.1 (15 Nov 2022 02:00), Max: 36.7 (14 Nov 2022 23:59)  T(F): 97 (15 Nov 2022 02:00), Max: 98 (14 Nov 2022 23:59)  HR: 55 (15 Nov 2022 02:00) (55 - 88)  BP: 102/52 (15 Nov 2022 02:26) (94/48 - 176/86)  BP(mean): --  RR: 18 (15 Nov 2022 02:00) (16 - 20)  SpO2: 96% (15 Nov 2022 02:00) (96% - 97%)    Parameters below as of 15 Nov 2022 02:00  Patient On (Oxygen Delivery Method): room air                              13.3   10.40 )-----------( 741      ( 14 Nov 2022 11:26 )             41.1       A/P    87yoF PMH HTN presents after an unwitnessed fall. Pt states she was moving her car out of her garage, got out of the car and doesn't remember falling or getting back to her apartment. Pt doesn't remember if she hit her head, but hit her mouth and chipped two teeth. She takes ASA daily. Denies dizziness, chest pain, SOB, abdominal pain N/V/D, dysuria, hematuria. Pt reports one prior episode of LOC when she was diagnosed with a UTI.  DX: Syncope, Elevated troponin  - on telemetry, continue monitoring   - 11/14 labs: troponin 384 --> 485 --> 475-> 385 , CKMB 11.0--> 14.7, D-Dimer 395  Trop peaked already  On aspirin, Heparin gtt, Empirically treating PE   Now with soft BP    Borderline BP/ Bradycardia  Pt asymptomatic, afebrile, not hypoxic  Stat CBc, Ptt, lactic ordered  patient on enalapril and BB? causing Bp trending down  12 lead EKG ordedred in setting of new bradycardia  NS 500ml iv bolus ordered  Check Bp post ivf bolus administration  Trend vitals  Will hold am BB, enalapril  F/U cardiology am          #PE  Call received froM Radiologist that CTA positive for small PE, pending prelim result  Pat already on heparin gtt  Will continue heparin gtt, changed to Full AC normogram in setting of PE   Trend CBC, ptt  F/U Cardiology am    Thrombocytosis, s/p NM bx outside with normal per pt  Hem eval am  Will discuss with Attending  Signed out to covering provider    Jean-Pierre Bailon FNP BC  [id number] Medicine NP note    CC: Borderline BP, Bradycardia  Notified by RN that patient with SBp in 90's to low 100's and pta symptomatic  Evaluated the pt at bedside, Pt lying in bed, in no distress, A$OX4    Vital Signs Last 24 Hrs  T(C): 36.1 (15 Nov 2022 02:00), Max: 36.7 (14 Nov 2022 23:59)  T(F): 97 (15 Nov 2022 02:00), Max: 98 (14 Nov 2022 23:59)  HR: 55 (15 Nov 2022 02:00) (55 - 88)  BP: 102/52 (15 Nov 2022 02:26) (94/48 - 176/86)  BP(mean): --  RR: 18 (15 Nov 2022 02:00) (16 - 20)  SpO2: 96% (15 Nov 2022 02:00) (96% - 97%)    Parameters below as of 15 Nov 2022 02:00  Patient On (Oxygen Delivery Method): room air                              13.3   10.40 )-----------( 741      ( 14 Nov 2022 11:26 )             41.1       A/P    87yoF PMH HTN presents after an unwitnessed fall. Pt states she was moving her car out of her garage, got out of the car and doesn't remember falling or getting back to her apartment. Pt doesn't remember if she hit her head, but hit her mouth and chipped two teeth. She takes ASA daily. Denies dizziness, chest pain, SOB, abdominal pain N/V/D, dysuria, hematuria. Pt reports one prior episode of LOC when she was diagnosed with a UTI.  DX: Syncope, Elevated troponin  - on telemetry, continue monitoring   - 11/14 labs: troponin 384 --> 485 --> 475-> 385 , CKMB 11.0--> 14.7, D-Dimer 395  Trop peaked already  On aspirin, Heparin gtt, Empirically treating PE   Now with soft BP    Borderline BP/ Bradycardia  Pt asymptomatic, afebrile, not hypoxic  Stat CBc, Ptt, lactic ordered  patient on enalapril and BB? causing Bp trending down  12 lead EKG ordedred in setting of new bradycardia  NS 500ml iv bolus ordered  Check Bp post ivf bolus administration  Trend vitals  Will hold am BB, enalapril  F/U cardiology am          #PE  Call received froM Radiologist that CTA positive for small PE, pending prelim result  f/u CTA official report am  Pat already on heparin gtt  Will continue heparin gtt, changed to Full AC normogram in setting of PE   Trend CBC, ptt  F/U Cardiology am    Thrombocytosis, s/p NM bx outside with normal per pt  Hem eval am  Will discuss with Attending  Signed out to covering provider    Jean-Pierre Bailon [facility] BC  [id number]

## 2022-11-15 NOTE — PROVIDER CONTACT NOTE (OTHER) - ACTION/TREATMENT ORDERED:
500cc NS fluid bolus, CBC/lactate 500cc NS fluid bolus, CBC/lactate. Push morning BP meds to 8am. KM Morejon saw pt at bedside for assessment

## 2022-11-15 NOTE — DIETITIAN INITIAL EVALUATION ADULT - REASON FOR ADMISSION
"87yoF PMH HTN presents after an unwitnessed fall clinical presentation consistent with syncope of unclear etiology possibly arrythmia and NSTEMI"

## 2022-11-15 NOTE — PATIENT PROFILE ADULT - FALL HARM RISK - HARM RISK INTERVENTIONS

## 2022-11-15 NOTE — PHYSICAL THERAPY INITIAL EVALUATION ADULT - PERTINENT HX OF CURRENT PROBLEM, REHAB EVAL
86yo  F with HTN presents after an unwitnessed fall. Pt states she was moving her car out of her garage, got out of the car and doesn't remember falling. She sustained abrasions to her lips and nose. After a few minutes she remembers walking to the garage door and then having a conversation with a passer by. She does remember going back to her apartment. Pt doesn't remember if she hit her head, but hit her mouth and chipped two teeth. She takes ASA daily. covid booster 2 weeks ago.   + LOC, no tongue bite, no convulsions, no incontinence   CTH and C spine unremarkable   CTA chest + PE 11/15/22 RML and RLL distal segment /subsegmental ,cardiomegaly R>L , mediastinal lymphadenopathy unknown etiology ;dimer 395 ;   TTE 11/14/22 mild MR ,MOD dilated LA/Mod TR/ FATMATA /LVE   trops in 400s now 165 on 11/15/22

## 2022-11-15 NOTE — DIETITIAN INITIAL EVALUATION ADULT - ORAL INTAKE PTA/DIET HISTORY
Pt reports good appetite and PO intake PTA. Does not follow any therapeutic diet restrictions. Confirms no known food allergies or food intolerances. Pt denies hx of chewing and swallowing difficulties. Denies use of nutritional supplements, confirms use of multivitamin and probiotics. Pt denies unintentional weight loss PTA.

## 2022-11-15 NOTE — DIETITIAN INITIAL EVALUATION ADULT - PERSON TAUGHT/METHOD
Emphasized the importance of adequate kcal and protein intake, provided recommendations to optimize nutritional intake in case of decreased appetite, recommended small frequent meals by consuming nutrient-dense snacks between meals, to start with protein, and sips of supplement throughout the day./verbal instruction/patient instructed

## 2022-11-15 NOTE — DIETITIAN INITIAL EVALUATION ADULT - PERTINENT MEDS FT
MEDICATIONS  (STANDING):  aspirin enteric coated 81 milliGRAM(s) Oral daily  atorvastatin 80 milliGRAM(s) Oral at bedtime  bacitracin   Ointment 1 Application(s) Topical every 12 hours  heparin  Infusion.  Unit(s)/Hr (9 mL/Hr) IV Continuous <Continuous>  metoprolol tartrate 25 milliGRAM(s) Oral two times a day    MEDICATIONS  (PRN):  heparin   Injectable 4000 Unit(s) IV Push every 6 hours PRN For aPTT less than 40  heparin   Injectable 2000 Unit(s) IV Push every 6 hours PRN For aPTT between 40 - 57  psyllium Powder 1 Packet(s) Oral two times a day PRN constipation

## 2022-11-15 NOTE — DIETITIAN INITIAL EVALUATION ADULT - NSFNSGIIOFT_GEN_A_CORE
- Pt denies nausea, vomiting, diarrhea. Reports constipation.   - Last BM:11/15; currently ordered for Metamucil.

## 2022-11-15 NOTE — DIETITIAN INITIAL EVALUATION ADULT - OTHER INFO
Weight Hx Per:  - Source: patient  - UBW: 106 pounds   - Reported weight changes: none    Weight Hx Per [facility] HIE: 52.2 kg on 7/31/2017    Current Admission Weights:  - Dosing weight: 109.9 pounds (49.7 kg) 11/14  - Daily weight: none documented thus far    Weight Change: none reported    **  Will continue to monitor weight trends as available/able.     IBW: 120 pounds   %IBW: 91%

## 2022-11-16 LAB
APTT BLD: 62.8 SEC — HIGH (ref 27.5–35.5)
HCT VFR BLD CALC: 37.2 % — SIGNIFICANT CHANGE UP (ref 34.5–45)
HGB BLD-MCNC: 11.9 G/DL — SIGNIFICANT CHANGE UP (ref 11.5–15.5)
MCHC RBC-ENTMCNC: 29.2 PG — SIGNIFICANT CHANGE UP (ref 27–34)
MCHC RBC-ENTMCNC: 32 GM/DL — SIGNIFICANT CHANGE UP (ref 32–36)
MCV RBC AUTO: 91.4 FL — SIGNIFICANT CHANGE UP (ref 80–100)
NRBC # BLD: 0 /100 WBCS — SIGNIFICANT CHANGE UP (ref 0–0)
PLATELET # BLD AUTO: 567 K/UL — HIGH (ref 150–400)
RBC # BLD: 4.07 M/UL — SIGNIFICANT CHANGE UP (ref 3.8–5.2)
RBC # FLD: 14.7 % — HIGH (ref 10.3–14.5)
WBC # BLD: 6.4 K/UL — SIGNIFICANT CHANGE UP (ref 3.8–10.5)
WBC # FLD AUTO: 6.4 K/UL — SIGNIFICANT CHANGE UP (ref 3.8–10.5)

## 2022-11-16 PROCEDURE — 75574 CT ANGIO HRT W/3D IMAGE: CPT | Mod: 26

## 2022-11-16 PROCEDURE — 74177 CT ABD & PELVIS W/CONTRAST: CPT | Mod: 26

## 2022-11-16 PROCEDURE — 93880 EXTRACRANIAL BILAT STUDY: CPT | Mod: 26

## 2022-11-16 RX ORDER — SODIUM CHLORIDE 9 MG/ML
500 INJECTION INTRAMUSCULAR; INTRAVENOUS; SUBCUTANEOUS ONCE
Refills: 0 | Status: COMPLETED | OUTPATIENT
Start: 2022-11-16 | End: 2022-11-16

## 2022-11-16 RX ADMIN — Medication 1 APPLICATION(S): at 21:50

## 2022-11-16 RX ADMIN — Medication 25 MILLIGRAM(S): at 11:53

## 2022-11-16 RX ADMIN — SODIUM CHLORIDE 500 MILLILITER(S): 9 INJECTION INTRAMUSCULAR; INTRAVENOUS; SUBCUTANEOUS at 14:13

## 2022-11-16 RX ADMIN — Medication 81 MILLIGRAM(S): at 13:41

## 2022-11-16 RX ADMIN — ATORVASTATIN CALCIUM 80 MILLIGRAM(S): 80 TABLET, FILM COATED ORAL at 21:55

## 2022-11-16 RX ADMIN — HEPARIN SODIUM 900 UNIT(S)/HR: 5000 INJECTION INTRAVENOUS; SUBCUTANEOUS at 19:58

## 2022-11-16 RX ADMIN — Medication 1 APPLICATION(S): at 05:46

## 2022-11-16 RX ADMIN — HEPARIN SODIUM 900 UNIT(S)/HR: 5000 INJECTION INTRAVENOUS; SUBCUTANEOUS at 07:39

## 2022-11-16 NOTE — PROVIDER CONTACT NOTE (OTHER) - SITUATION
Pt admitted from ED to [facility]. Upon arrival to the floor BP 94/48 and HR 55 manually.
Pt has home dose of metoprolol 25mg BID. While, in hospital has been running lower BP. When metoprolol is given systolic drops under 100 and fluid bolus is given. HR remains 50-60

## 2022-11-16 NOTE — PROVIDER CONTACT NOTE (OTHER) - BACKGROUND
Pt comes in for NSTEMI and syncope episode leading to unwitnessed fall. CT angio positive for PE.
Pt comes in for PE after syncope episode causing a fall.

## 2022-11-17 ENCOUNTER — TRANSCRIPTION ENCOUNTER (OUTPATIENT)
Age: 87
End: 2022-11-17

## 2022-11-17 VITALS
TEMPERATURE: 98 F | DIASTOLIC BLOOD PRESSURE: 60 MMHG | HEART RATE: 60 BPM | RESPIRATION RATE: 18 BRPM | SYSTOLIC BLOOD PRESSURE: 108 MMHG | OXYGEN SATURATION: 97 %

## 2022-11-17 DIAGNOSIS — R77.8 OTHER SPECIFIED ABNORMALITIES OF PLASMA PROTEINS: ICD-10-CM

## 2022-11-17 LAB
APTT BLD: 53.2 SEC — HIGH (ref 27.5–35.5)
HCT VFR BLD CALC: 34.8 % — SIGNIFICANT CHANGE UP (ref 34.5–45)
HGB BLD-MCNC: 11.2 G/DL — LOW (ref 11.5–15.5)
MCHC RBC-ENTMCNC: 29.7 PG — SIGNIFICANT CHANGE UP (ref 27–34)
MCHC RBC-ENTMCNC: 32.2 GM/DL — SIGNIFICANT CHANGE UP (ref 32–36)
MCV RBC AUTO: 92.3 FL — SIGNIFICANT CHANGE UP (ref 80–100)
NRBC # BLD: 0 /100 WBCS — SIGNIFICANT CHANGE UP (ref 0–0)
PLATELET # BLD AUTO: 547 K/UL — HIGH (ref 150–400)
RBC # BLD: 3.77 M/UL — LOW (ref 3.8–5.2)
RBC # FLD: 14.8 % — HIGH (ref 10.3–14.5)
WBC # BLD: 7.47 K/UL — SIGNIFICANT CHANGE UP (ref 3.8–10.5)
WBC # FLD AUTO: 7.47 K/UL — SIGNIFICANT CHANGE UP (ref 3.8–10.5)

## 2022-11-17 PROCEDURE — 83605 ASSAY OF LACTIC ACID: CPT

## 2022-11-17 PROCEDURE — 85025 COMPLETE CBC W/AUTO DIFF WBC: CPT

## 2022-11-17 PROCEDURE — 71275 CT ANGIOGRAPHY CHEST: CPT

## 2022-11-17 PROCEDURE — 84443 ASSAY THYROID STIM HORMONE: CPT

## 2022-11-17 PROCEDURE — 93005 ELECTROCARDIOGRAM TRACING: CPT

## 2022-11-17 PROCEDURE — 90471 IMMUNIZATION ADMIN: CPT

## 2022-11-17 PROCEDURE — 70450 CT HEAD/BRAIN W/O DYE: CPT | Mod: MA

## 2022-11-17 PROCEDURE — 36415 COLL VENOUS BLD VENIPUNCTURE: CPT

## 2022-11-17 PROCEDURE — 85027 COMPLETE CBC AUTOMATED: CPT

## 2022-11-17 PROCEDURE — 84484 ASSAY OF TROPONIN QUANT: CPT

## 2022-11-17 PROCEDURE — 80053 COMPREHEN METABOLIC PANEL: CPT

## 2022-11-17 PROCEDURE — 83036 HEMOGLOBIN GLYCOSYLATED A1C: CPT

## 2022-11-17 PROCEDURE — 81001 URINALYSIS AUTO W/SCOPE: CPT

## 2022-11-17 PROCEDURE — 85379 FIBRIN DEGRADATION QUANT: CPT

## 2022-11-17 PROCEDURE — 72125 CT NECK SPINE W/O DYE: CPT | Mod: MA

## 2022-11-17 PROCEDURE — 84100 ASSAY OF PHOSPHORUS: CPT

## 2022-11-17 PROCEDURE — 99291 CRITICAL CARE FIRST HOUR: CPT | Mod: 25

## 2022-11-17 PROCEDURE — 75574 CT ANGIO HRT W/3D IMAGE: CPT

## 2022-11-17 PROCEDURE — 93880 EXTRACRANIAL BILAT STUDY: CPT

## 2022-11-17 PROCEDURE — 82553 CREATINE MB FRACTION: CPT

## 2022-11-17 PROCEDURE — 93970 EXTREMITY STUDY: CPT

## 2022-11-17 PROCEDURE — 83735 ASSAY OF MAGNESIUM: CPT

## 2022-11-17 PROCEDURE — 95816 EEG AWAKE AND DROWSY: CPT

## 2022-11-17 PROCEDURE — 82550 ASSAY OF CK (CPK): CPT

## 2022-11-17 PROCEDURE — 93306 TTE W/DOPPLER COMPLETE: CPT

## 2022-11-17 PROCEDURE — 85610 PROTHROMBIN TIME: CPT

## 2022-11-17 PROCEDURE — 87635 SARS-COV-2 COVID-19 AMP PRB: CPT

## 2022-11-17 PROCEDURE — 74177 CT ABD & PELVIS W/CONTRAST: CPT

## 2022-11-17 PROCEDURE — 80061 LIPID PANEL: CPT

## 2022-11-17 PROCEDURE — 90715 TDAP VACCINE 7 YRS/> IM: CPT

## 2022-11-17 PROCEDURE — 97162 PT EVAL MOD COMPLEX 30 MIN: CPT

## 2022-11-17 PROCEDURE — 85730 THROMBOPLASTIN TIME PARTIAL: CPT

## 2022-11-17 RX ORDER — ATORVASTATIN CALCIUM 80 MG/1
1 TABLET, FILM COATED ORAL
Qty: 30 | Refills: 0
Start: 2022-11-17 | End: 2022-12-16

## 2022-11-17 RX ORDER — PSYLLIUM SEED (WITH DEXTROSE)
1 POWDER (GRAM) ORAL DAILY
Refills: 0 | Status: DISCONTINUED | OUTPATIENT
Start: 2022-11-17 | End: 2022-11-17

## 2022-11-17 RX ORDER — APIXABAN 2.5 MG/1
2 TABLET, FILM COATED ORAL
Qty: 28 | Refills: 0
Start: 2022-11-17 | End: 2022-11-23

## 2022-11-17 RX ORDER — APIXABAN 2.5 MG/1
10 TABLET, FILM COATED ORAL ONCE
Refills: 0 | Status: DISCONTINUED | OUTPATIENT
Start: 2022-11-17 | End: 2022-11-17

## 2022-11-17 RX ORDER — APIXABAN 2.5 MG/1
2 TABLET, FILM COATED ORAL
Qty: 74 | Refills: 0
Start: 2022-11-17 | End: 2022-12-16

## 2022-11-17 RX ORDER — APIXABAN 2.5 MG/1
10 TABLET, FILM COATED ORAL
Refills: 0 | Status: DISCONTINUED | OUTPATIENT
Start: 2022-11-17 | End: 2022-11-17

## 2022-11-17 RX ORDER — ATORVASTATIN CALCIUM 80 MG/1
20 TABLET, FILM COATED ORAL AT BEDTIME
Refills: 0 | Status: DISCONTINUED | OUTPATIENT
Start: 2022-11-17 | End: 2022-11-17

## 2022-11-17 RX ORDER — PANTOPRAZOLE SODIUM 20 MG/1
1 TABLET, DELAYED RELEASE ORAL
Qty: 30 | Refills: 0
Start: 2022-11-17 | End: 2022-12-16

## 2022-11-17 RX ORDER — PANTOPRAZOLE SODIUM 20 MG/1
40 TABLET, DELAYED RELEASE ORAL
Refills: 0 | Status: DISCONTINUED | OUTPATIENT
Start: 2022-11-17 | End: 2022-11-17

## 2022-11-17 RX ADMIN — Medication 81 MILLIGRAM(S): at 10:07

## 2022-11-17 RX ADMIN — APIXABAN 10 MILLIGRAM(S): 2.5 TABLET, FILM COATED ORAL at 10:04

## 2022-11-17 RX ADMIN — Medication 1 APPLICATION(S): at 05:19

## 2022-11-17 RX ADMIN — Medication 25 MILLIGRAM(S): at 10:12

## 2022-11-17 NOTE — PROGRESS NOTE ADULT - NSPROGADDITIONALINFOA_GEN_ALL_CORE
discussed with patient in detail, expresses understanding of treatment plans.  discussed with son Velasquez over the phone in detail
- Counseling on diet, exercise, and medication adherence was done  - Counseling on smoking cessation and alcohol consumption offered when appropriate.  - Pain assessed and judicious use of narcotics when appropriate was discussed.    - Stroke education given when appropriate.  - Importance of fall prevention discussed.   - Differential diagnosis and plan of care discussed with patient and/or family and primary team
discussed with patient in detail, expresses understanding of treatment plans.  discussed with son Velasquez over the phone in detail
discussed with patient in detail, expresses understanding of treatment plans.  discussed with son   discussed with covering ACP

## 2022-11-17 NOTE — DISCHARGE NOTE NURSING/CASE MANAGEMENT/SOCIAL WORK - PATIENT PORTAL LINK FT
You can access the FollowMyHealth Patient Portal offered by [facility] by registering at the following website: [email or web address]. By joining [employer]’s FollowMyHealth portal, you will also be able to view your health information using other applications (apps) compatible with our system.

## 2022-11-17 NOTE — PROGRESS NOTE ADULT - PROBLEM SELECTOR PLAN 4
continue metoprolol with hold parameters   discontinue enalapril
discontinue all BP meds  will continue to monitor as outpatient
continue metoprolol with hold parameters

## 2022-11-17 NOTE — CONSULT NOTE ADULT - CONSULT REQUESTED DATE/TIME
14-Nov-2022 16:31
14-Nov-2022 12:42
17-Nov-2022 12:25
14-Nov-2022 15:38
15-Nov-2022 10:59
17-Nov-2022 11:53

## 2022-11-17 NOTE — DISCHARGE NOTE PROVIDER - DETAILS OF MALNUTRITION DIAGNOSIS/DIAGNOSES
This patient has been assessed with a concern for Malnutrition and was treated during this hospitalization for the following Nutrition diagnosis/diagnoses:     -  11/15/2022: Underweight (BMI < 19)

## 2022-11-17 NOTE — CHART NOTE - NSCHARTNOTEFT_GEN_A_CORE
Patient seen and examined at bedside this morning. Patient reports seeing a hematologist for her thrombocytosis at [facility]. Patient is cleared for discharge from a hematological perspective. She will continue to f/up with her hematologist upon discharge. Patient seen and examined at bedside this morning. Patient reports seeing a hematologist for her thrombocytosis at [facility]. Patient is cleared for discharge from a hematological perspective. She will continue to f/up with her hematologist upon discharge.    ADDENDUM: Thrombocytosis stable. Not at a range that it should cause any symptoms. Patient advised to seek follow-up upon discharge.

## 2022-11-17 NOTE — DISCHARGE NOTE PROVIDER - NPI NUMBER (FOR [employer] USE ONLY) :
[[id number]],[[id number]] [[id number]],[UNKNOWN],[[id number]] [[id number]],[[id number]],[UNKNOWN],[[id number]] [[id number]],[[id number]],[UNKNOWN],[[id number]]

## 2022-11-17 NOTE — DISCHARGE NOTE PROVIDER - CARE PROVIDER_API CALL
Alpesh Meza)  Cardiac Electrophysiology; Cardiovascular Disease; Internal Medicine  [address] [facility], [place] [address]  Phone: [phone]  Fax: [phone]  Follow Up Time: 2 weeks    Dr Colby  Hematology  Phone: (   )    -  Fax: (   )    -  Follow Up Time:    Alpesh Meza)  Cardiac Electrophysiology; Cardiovascular Disease; Internal Medicine  [address] [facility], [address] [address]  Phone: [phone]  Fax: [phone]  Follow Up Time: 2 weeks    Dr Colby  Hematology  Phone: (   )    -  Fax: (   )    -  Follow Up Time:     Scott Lovelace)  Critical Care Medicine; Internal Medicine; Pulmonary Disease  [address] [place] [address]  Phone: [phone]  Fax: [phone]  Follow Up Time:    Alpesh Meza)  Cardiac Electrophysiology; Cardiovascular Disease; Internal Medicine  [address] [facility], [address] [address]  Phone: [phone]  Fax: [phone]  Follow Up Time: 2 weeks    Scott Lovelace)  Critical Care Medicine; Internal Medicine; Pulmonary Disease  [address] [place] [address]  Phone: [phone]  Fax: [phone]  Follow Up Time:     Dr Colby  Hematology  Phone: (   )    -  Fax: (   )    -  Follow Up Time:     REINALDO HERNANDEZ  Internal Medicine  [address] [place] [address]  Phone: [phone]  Fax: [phone]  Follow Up Time: 2 weeks   Alpesh Meza)  Cardiac Electrophysiology; Cardiovascular Disease; Internal Medicine  [address] [facility], [address] [place] [address]  Phone: [phone]  Fax: [phone]  Follow Up Time: 2 weeks    REINALDO HERNANDEZ  Internal Medicine  [address] [place] [address]  Phone: [phone]  Fax: [phone]  Follow Up Time: 2 weeks    Dr oClby  Hematology  Phone: (   )    -  Fax: (   )    -  Follow Up Time:     Richard Ely  CRITICAL CARE MEDICINE  [address] [place] [address], [address]  [place] [address]  Phone: [phone]  Fax: [phone]  Follow Up Time: 2 weeks

## 2022-11-17 NOTE — PROGRESS NOTE ADULT - ASSESSMENT
86yo  F with HTN presents after an unwitnessed fall. Pt states she was moving her car out of her garage, got out of the car and doesn't remember falling. She sustained abrasions to her lips and nose. After a few minutes she remembers walking to the garage door and then having a conversation with a passer by. She does remember going back to her apartment. Pt doesn't remember if she hit her head, but hit her mouth and chipped two teeth. She takes ASA daily.  covid booster 2 weeks ago.   + LOC, no tongue bite, no convulsions, no incontinence   CTH and C spine unremarkable   CTA chest + PE   dimer 395   trops in 400s   TTE: moderately dilated LA: EF 50%, hgypokinesis noted   EEG normal   CD no hemodynamic significant stenosis   CT angio heart: non obstructive CAD   A1c 5.7     Impression: Syncope may have been related to PE.  PE possible 2/2 COVID booster? hypercoaguability ; doubt seizure   - heparin drip for PE --> changed to DOAC, now on apixaban   - check orthostaitcs   - cardio for NSTEMI. AC, asa, BB and statin   - trend troponins  - telemetry  - f/u heme regarding thrombocytosis   - PT/OT/SS/SLP, OOBC  - check FS, glucose control <180  - GI/DVT ppx  - Thank you for allowing me to participate in the care of this patient. Call with questions.   Wesley Ritter MD  Vascular Neurology  Office: [phone] 
Agree with above assessment and plan as outlined above.    - CTPA noted (+) PE echo with normal RV fx but segmental wall motion abnormalities on echo ? branch disease.  her most likely acute event is the PE  - She has no angina and walks 4 miles a day since she has no calcium on her Chest CT plan for CT cors  - cont heparin gtt    Zi Shepherd MD, [facility]  BEEPER [phone]  
Agree with above assessment and plan as outlined above.    - awaiting CT cors,   - Doubt significant CAD    iZ Shepherd MD, [facility]  BEEPER [phone]  
Patient seen and examined, agree with above assessment and plan as transcribed above.    - Minimal CAD, D?W patient and family since she is 87 and only has minimal CAD and no cardiovascular events with an acceptable LDL can for go the statin at this point and defer to her primary cardio    Zi Shepherd MD, [facility]  BEEPER [phone]  
87yoF PMH HTN presents after an unwitnessed fall clinical presentation consistent with syncope of unclear etiology possibly arrythmia and NSTEMI
87yoF PMH HTN presents after an unwitnessed fall clinical presentation consistent with syncope of unclear etiology possibly arrythmia and NSTEMI
87yoF PMH HTN presents after an unwitnessed fall clinical presentation consistent with syncope of likely secondary to acute PE, NSTEMI ruled out after admission

## 2022-11-17 NOTE — CONSULT NOTE ADULT - SUBJECTIVE AND OBJECTIVE BOX
11-17-22 @ 11:53    Patient is a 87y old  Female who presents with a chief complaint of syncope and fall (17 Nov 2022 10:54)      HPI:  87yoF PMH HTN presents after an unwitnessed fall. Pt states she was moving her car out of her garage, got out of the car and doesn't remember falling. She sustained abrasions to her lips and nose. After a few minutes she remembers walking to the garage door and then having a conversation with a passer by. She does remember going back to her apartment. Pt doesn't remember if she hit her head, but hit her mouth and chipped two teeth. She takes ASA daily. Denies preceding dizziness, chest pain, SOB, abdominal pain N/V/D, dysuria, hematuria. Pt reports one prior episode of LOC when she was diagnosed with a UTI a while back. States had a COVID booster dose 2 weeks ago. (14 Nov 2022 13:22)     she had covid in march od 2020 but never had pe or dvt:  she deneis having any underlying lung condition:    she feels OK today and has no sob or cough  or phlegm :    ?FOLLOWING PRESENT  [ x] Hx of PE/DVT, [ x] Hx COPD, [ x] Hx of Asthma, [ ]x Hx of Hospitalization, [x ]  Hx of BiPAP/CPAP use, [ x] Hx of VAISHNAVI    Allergies    penicillin (Unknown)    Intolerances        PAST MEDICAL & SURGICAL HISTORY:  HTN (hypertension)      UTI (urinary tract infection), bacterial      2019 novel coronavirus disease (COVID-19)  in 2020 March      No significant past surgical history          FAMILY HISTORY:  No pertinent family history in first degree relatives        Social History: [ remote ] TOBACCO                  [x  ] ETOH                                 [ x ] IVDA/DRUGS    REVIEW OF SYSTEMS      General:	syncope    Skin/Breast:x  	  Ophthalmologic:x  	  ENMT:	x    Respiratory and Thorax: no sob, no cough : no brooks  	  Cardiovascular:	x    Gastrointestinal:	x    Genitourinary:	x    Musculoskeletal:	x    Neurological:	x    Psychiatric:	x    Hematology/Lymphatics:	x    Endocrine:	x    Allergic/Immunologic:	x    MEDICATIONS  (STANDING):  apixaban 10 milliGRAM(s) Oral two times a day  apixaban 10 milliGRAM(s) Oral once  atorvastatin 20 milliGRAM(s) Oral at bedtime  bacitracin   Ointment 1 Application(s) Topical every 12 hours    MEDICATIONS  (PRN):  psyllium Powder 1 Packet(s) Oral two times a day PRN constipation       Vital Signs Last 24 Hrs  T(C): 36.6 (17 Nov 2022 10:15), Max: 36.7 (16 Nov 2022 20:57)  T(F): 97.8 (17 Nov 2022 10:15), Max: 98.1 (16 Nov 2022 20:57)  HR: 67 (17 Nov 2022 10:15) (59 - 68)  BP: 120/78 (17 Nov 2022 10:15) (101/60 - 135/78)  BP(mean): --  RR: 18 (17 Nov 2022 10:15) (18 - 19)  SpO2: 98% (17 Nov 2022 10:15) (97% - 100%)    Parameters below as of 17 Nov 2022 10:15  Patient On (Oxygen Delivery Method): room air    Orthostatic VS    11-15-22 @ 15:15  Lying BP: 109/60 HR: 70   Sitting BP: 116/65 HR: 69  Standing BP: 120/74 HR: 73  Site: --   Mode: --          I&O's Summary    16 Nov 2022 07:01  -  17 Nov 2022 07:00  --------------------------------------------------------  IN: 536 mL / OUT: 0 mL / NET: 536 mL        Physical Exam:   GENERAL: NAD, well-groomed, well-developed  HEENT: ANTHONY/   Atraumatic, Normocephalic  ENMT: No tonsillar erythema, exudates, or enlargement; Moist mucous membranes, Good dentition, No lesions  NECK: Supple, No JVD, Normal thyroid  CHEST/LUNG: Clear to auscultation bilaterally  CVS: Regular rate and rhythm; No murmurs, rubs, or gallops  GI: : Soft, Nontender, Nondistended; Bowel sounds present  NERVOUS SYSTEM:  Alert & Oriented X3  EXTREMITIES:  2+ Peripheral Pulses, No clubbing, cyanosis, or edema  LYMPH: No lymphadenopathy noted  SKIN: No rashes or lesions  ENDOCRINOLOGY: No Thyromegaly  PSYCH: Appropriate    Labs:  COVID-19 PCR: NotDetec (14 Nov 2022 11:23)                              11.2   7.47  )-----------( 547      ( 17 Nov 2022 07:10 )             34.8                         11.9   6.40  )-----------( 567      ( 16 Nov 2022 07:02 )             37.2                         12.2   6.77  )-----------( 621      ( 15 Nov 2022 11:17 )             38.1                         11.4   7.52  )-----------( 602      ( 15 Nov 2022 08:04 )             35.5                         11.4   8.46  )-----------( 634      ( 15 Nov 2022 03:07 )             35.1                         13.3   10.40 )-----------( 741      ( 14 Nov 2022 11:26 )             41.1     11-15    137  |  104  |  17  ----------------------------<  95  4.2   |  21<L>  |  0.66  11-14    136  |  100  |  22  ----------------------------<  113<H>  4.0   |  25  |  0.52      TPro  6.3  /  Alb  3.4  /  TBili  0.4  /  DBili  x   /  AST  34  /  ALT  22  /  AlkPhos  68  11-15  TPro  7.6  /  Alb  4.3  /  TBili  0.3  /  DBili  x   /  AST  28  /  ALT  23  /  AlkPhos  78  11-14    CAPILLARY BLOOD GLUCOSE          PTT - ( 17 Nov 2022 07:07 )  PTT:53.2 sec    D DImerLactate, Blood: 1.0 mmol/L (11-15 @ 03:07)    D-Dimer Assay, Quantitative: 395 ng/mL DDU (11-14 @ 11:26)      Studies  Chest X-RAY  CT SCAN Chest   CT Abdomen  Venous Dopplers: LE:   Others        rad< from: CT Abdomen and Pelvis w/ Oral Cont and w/ IV Cont (11.16.22 @ 21:34) >  TERPRETATION:  CLINICAL INFORMATION: Unexplained DVT. Evaluate   intra-abdominal pathology.    COMPARISON: None CT chest 11/15/2022.    CONTRAST/COMPLICATIONS:  IV Contrast: Omnipaque 350  90 cc administered   10 cc discarded  Oral Contrast: Smoothie Readi-Cat 2  Complications: None reported at time of study completion    PROCEDURE:  CT of the Abdomen and Pelvis was performed.  Sagittal and coronal reformats were performed.    FINDINGS:  LOWER CHEST: Mild scattered and bibasilar linear atelectasis.   Cardiomegaly.    LIVER: Within normal limits.  BILE DUCTS: Mild intrahepatic and extrahepatic biliary ductal dilatation   appears to be secondary to cholecystectomy.  GALLBLADDER: Cholecystectomy.  SPLEEN: Within normal limits.  PANCREAS: Within normal limits. Hepatic duct measures upper normal in   caliber.  ADRENALS: Within normal limits.  KIDNEYS/URETERS: Symmetric enhancementof the kidneys. No hydronephrosis.   Centimeters hypodensity lower pole right kidney is too small to   characterize.    BLADDER: Contrast-filled bladder. Mild degree of bladder prolapse as   bladder sits below the upper margin of the pubic symphysis.  REPRODUCTIVE ORGANS: No pelvic masses.    BOWEL: No bowel obstruction. Appendix is not visualized. No evidence of   inflammation in the pericecal region. Moderate stool burden.  PERITONEUM: No ascites.  VESSELS: Atherosclerotic changes.  RETROPERITONEUM/LYMPH NODES: No lymphadenopathy. Minimal presacral edema.  ABDOMINAL WALL: Small fat-containing bilateral inguinal hernia.  BONES: Redemonstrated T12 superior endplate Compression deformity.   Degenerative changes. Mild anterolisthesis of L4 on L5. Tarlov sacral   cysts.    IMPRESSION:  No acute intra-abdominal pathology.        --- End of Report ---      < end of copied text >  < from: [facility] Duplex Lower Ext Vein Scan, Bilat (11.15.22 @ 15:35) >  veins.  Doppler examination shows normal spontaneous and phasic flow.  No LEFT calf veinthrombosis is detected.    IMPRESSION:  No evidence of deep venous thrombosis in either lower extremity.    --- End of Report ---            SANYA VILLAFANA MD; Attending Radiologist  This document has been electronically signed. Nov 15 2022  3:43PM    < end of copied text >  < from: Transthoracic Echocardiogram (11.14.22 @ 15:40) >  Hemodynamic: Estimated right ventricular systolic pressure  equals 44 mm Hg, assuming right atrial pressure equals 8 mm  Hg, consistent with mild pulmonary hypertension.  ------------------------------------------------------------------------  Conclusions:  1. Normal mitral valve. Mild mitral regurgitation.  2. Moderately dilated left atrium.  LA volume index = 43  cc/m2. Increased relative wall thickness with normal left  ventricular mass index, consistent with concentric left  ventricular remodeling.Mildly decreased left ventricular  systolic function.  Overall preserved left ventricular  ejection fraction. LVEF calculated using biplane Steve's  method is 50%. Hypokinesis of the base-mid anterior, mid  anteroseptal wall, and anterolateral wall. Other walls are  normal.Moderate diastolic dysfunction (Stage II).  3. Moderate right atrial enlargement.Right ventricular  enlargement with normal right ventricular systolic  function.  4. Normal tricuspid valve. Moderate tricuspid  regurgitation.Estimated pulmonary artery systolic pressure  equals 44  mm Hg, assuming right atrial pressure equals 8  mm Hg, consistent with mild pulmonary pressures.  5. Minimal  loculated pericardial effusion adjacent to the  right atrium. No echocardiographic evidence of pericardial  tamponade.  *** No previous Echo exam.  Discussed findings with CHELSEY Hendricks    < end of copied text >      ct< from: CT Angio Chest PE Protocol w/ IV Cont (11.15.22 @ 01:20) >  MEDIASTINUM AND MARINA: There is enlargement of a 1.2 cm right hilar node   and 1.6 cm subcarinal node.  VESSELS: Right middle and lower lobe distal segmental/subsegmental   pulmonary emboli.  HEART: Cardiomegaly, right greater than left. No pericardial effusion.  CHEST WALL AND LOWER NECK: Within normal limits.  VISUALIZED UPPER ABDOMEN: Within normal limits.  BONES: Degenerative changes of the visualized spine. There is a mild   age-indeterminant T12 superior endplate compression deformity.    IMPRESSION:    Right middle and lower lobe distal segmental and subsegmental pulmonary   emboli.    Cardiomegaly, right greater than left. Correlation with echocardiogram is   suggested.    Mediastinal lymphadenopathy of unknown etiology.    These findings were discussed with SEAN Bailon at 11/15/2022 5:57 AM by   Dr. Solomon of Radiology with read back confirmation.    --- End of Report ---    < end of copied text >

## 2022-11-17 NOTE — PROGRESS NOTE ADULT - PROBLEM SELECTOR PLAN 3
likely secondary to PE  fall precautions  echocardiogram no valvular abnormality
likely secondary to PE  echocardiogram no valvular abnormality
likely secondary to PE  fall precautions  echocardiogram no valvular abnormality

## 2022-11-17 NOTE — DISCHARGE NOTE NURSING/CASE MANAGEMENT/SOCIAL WORK - NSDCFUADDAPPT_GEN_ALL_CORE_FT
APPTS ARE READY TO BE MADE: [x ] YES    Best Family or Patient Contact (if needed):    Additional Information about above appointments (if needed):    1: Please follow up with your cardiologist in 2 weeks  2: Continue to follow up with your hematologist  3:     Other comments or requests:

## 2022-11-17 NOTE — PROGRESS NOTE ADULT - NUTRITIONAL ASSESSMENT
This patient has been assessed with a concern for Malnutrition and has been determined to have a diagnosis/diagnoses of Underweight (BMI < 19).    This patient is being managed with:   Diet Regular-  Entered: Nov 14 2022  1:27PM    
This patient has been assessed with a concern for Malnutrition and has been determined to have a diagnosis/diagnoses of Underweight (BMI < 19).    This patient is being managed with:   Diet Regular-  Entered: Nov 14 2022  1:27PM

## 2022-11-17 NOTE — PROGRESS NOTE ADULT - PROBLEM SELECTOR PLAN 1
Please advise when Dr. Luis Mtz could see the patient for a soccer injury that happened last night.
Recommend non-surgical ortho, Dr. Azevedo Cons.
The patient injured her left ankle last night during a soccer game and heard a loud pop. She was seen at [facility] walk in clinic and was told there is no break, but patient can not bear weight on it. Patient's mom Claudia Sanchez is requesting to schedule an appointment with Dr. Leif North.      [phone]
noted on CTA  already on full AC with IV heparin  pulmonary consultation   bilateral venous duplex to r/o DVT ordered
continue full AC with IV heparin  pulmonary consultation appreciated  bilateral venous duplex to r/o DVT negative  heme evaluation for mild chronic thrombocytopenia at patient's request (called)  CT abd added on too coronary CT
now on apixaban load  cleared by cardiology for same  no further intervention at this time at this time  bilateral venous duplex to r/o DVT negative  discussed with heme attending  no contraindication to discharge for outpatient follow up with outpatient ovidio  will need 6 months

## 2022-11-17 NOTE — PROGRESS NOTE ADULT - SUBJECTIVE AND OBJECTIVE BOX
EP ATTENDING    tele: NSR, no events    she denies palpitations, syncope, nor angina, ROS otherwise -     DATE OF SERVICE - 11-17-22     Review of Systems:   Constitutional: [ ] fevers, [ ] chills.   Skin: [ ] dry skin. [ ] rashes.  Psychiatric: [ ] depression, [ ] anxiety.   Gastrointestinal: [ ] BRBPR, [ ] melena.   Neurological: [ ] confusion. [ ] seizures. [ ] shuffling gait.   Ears,Nose,Mouth and Throat: [ ] ear pain [ ] sore throat.   Eyes: [ ] diplopia.   Respiratory: [ ] hemoptysis. [ ] shortness of breath  Cardiovascular: See HPI above  Hematologic/Lymphatic: [ ] anemia. [ ] painful nodes. [ ] prolonged bleeding.   Genitourinary: [ ] hematuria. [ ] flank pain.   Endocrine: [ ] significant change in weight. [ ] intolerance to heat and cold.     Review of systems [ x] otherwise negative, [ ] otherwise unable to obtain    FH: no family history of sudden cardiac death in first degree relatives    SH: [ ] tobacco, [ ] alcohol, [ ] drugs  apixaban 10 milliGRAM(s) Oral two times a day  apixaban 10 milliGRAM(s) Oral once  aspirin enteric coated 81 milliGRAM(s) Oral daily  atorvastatin 80 milliGRAM(s) Oral at bedtime  bacitracin   Ointment 1 Application(s) Topical every 12 hours  metoprolol tartrate 25 milliGRAM(s) Oral two times a day  psyllium Powder 1 Packet(s) Oral two times a day PRN                            11.2   7.47  )-----------( 547      ( 17 Nov 2022 07:10 )             34.8       CARDIAC MARKERS ( 14 Nov 2022 12:32 )  x     / x     / 123 U/L / x     / 14.7 ng/mL  CARDIAC MARKERS ( 14 Nov 2022 11:26 )  x     / x     / x     / x     / 11.0 ng/mL        T(C): 36.6 (11-17-22 @ 10:15), Max: 36.7 (11-16-22 @ 20:57)  HR: 67 (11-17-22 @ 10:15) (59 - 68)  BP: 120/78 (11-17-22 @ 10:15) (101/60 - 135/78)  RR: 18 (11-17-22 @ 10:15) (18 - 19)  SpO2: 98% (11-17-22 @ 10:15) (97% - 100%)  Wt(kg): --    I&O's Summary    16 Nov 2022 07:01  -  17 Nov 2022 07:00  --------------------------------------------------------  IN: 536 mL / OUT: 0 mL / NET: 536 mL      General: Well nourished in no acute distress. Alert and Oriented * 3.   Head: Normocephalic and atraumatic.   Neck: No JVD. No bruits. Supple. Does not appear to be enlarged.   Cardiovascular: + S1,S2 ; RRR Soft systolic murmur at the left lower sternal border. No rubs noted.    Lungs: CTA b/l. No rhonchi, rales or wheezes.   Abdomen: + BS, soft. Non tender. Non distended. No rebound. No guarding.   Extremities: No clubbing/cyanosis/edema.   Neurologic: Moves all four extremities. Full range of motion.   Skin: Warm and moist. The patient's skin has normal elasticity and good skin turgor.   Psychiatric: Appropriate mood and affect.  Musculoskeletal: Normal range of motion, normal strength      echo: LVEF 50% with regional wall motion abnormalities  ekg: NSR, narrow QRS, diffuse inferolateral Twi with ST depressions  < from: CT Angio Heart and Coronaries w/ IV Cont (11.16.22 @ 15:00) >  MPRESSION:  1. Non obstructive disease of the coronaries as detailed above.    2. Agatston calcium score of 0.    3. Atria and right ventricle are qualitatively enlarged, there is trivial   pericardial effusion, recommend correlation with echocardiogram as   clinically indicated.    < end of copied text >      A/P) 88 y/o female with hypertension a/w sudden syncope. Echo EF 50%, EKG NSR narrow QRS. CTPA showed pulmonary emboli    -continue asa and lipitor for assumed CAD  -continue metoprolol for hypertension  -anticoagulation for PE as per primary team:  recommend Apixaban or Xarelto - confirm with pharmacy re: loading dose given her age and  body weight.  -ischemic workup - CTA Heart images personally reviewed. Minimal to no coronary artery plaque.  -no further inpatient EP workup expected.  from my perspective she could be discharged home.    Alpesh Meza M.D.  Cardiac Electrophysiology  [phone]
Neurology Progress Note    S: Patient seen and examined. No new events overnight. patient denied CP, SOB, HA or pain.     Medication:  apixaban 10 milliGRAM(s) Oral two times a day  apixaban 10 milliGRAM(s) Oral once  aspirin enteric coated 81 milliGRAM(s) Oral daily  atorvastatin 80 milliGRAM(s) Oral at bedtime  bacitracin   Ointment 1 Application(s) Topical every 12 hours  metoprolol tartrate 25 milliGRAM(s) Oral two times a day  psyllium Powder 1 Packet(s) Oral two times a day PRN      Vitals:  Vital Signs Last 24 Hrs  T(C): 36.6 (17 Nov 2022 10:15), Max: 36.7 (16 Nov 2022 20:57)  T(F): 97.8 (17 Nov 2022 10:15), Max: 98.1 (16 Nov 2022 20:57)  HR: 67 (17 Nov 2022 10:15) (59 - 68)  BP: 120/78 (17 Nov 2022 10:15) (99/61 - 135/78)  BP(mean): --  RR: 18 (17 Nov 2022 10:15) (18 - 19)  SpO2: 98% (17 Nov 2022 10:15) (97% - 100%)    Parameters below as of 17 Nov 2022 10:15  Patient On (Oxygen Delivery Method): room air        General Exam:   General Appearance: Appropriately dressed and in no acute distress       Head: Normocephalic, atraumatic and no dysmorphic features  Ear, Nose, and Throat: Moist mucous membranes  CVS: S1S2+  Resp: No SOB, no wheeze or rhonchi  Abd: soft NTND  Extremities: No edema, no cyanosis  Skin: No bruises, no rashes     Neurological Exam:  Mental Status: Awake, alert and oriented x 3.  Able to follow simple and complex verbal commands. Able to name and repeat. fluent speech. No obvious aphasia or dysarthria noted.   Cranial Nerves: PERRL, EOMI, VFFC, sensation V1-V3 intact,  no obvious facial asymmetry, equal elevation of palate, scm/trap 5/5, tongue is midline on protrusion. no obvious papilledema on fundoscopic exam. hearing is grossly intact.   Motor: Normal bulk, tone and strength throughout. Fine finger movements were intact and symmetric. no tremors or drift noted.    Sensation: Intact to light touch and pinprick throughout. no right/left confusion. no extinction to tactile on DSS. Romberg was negative.   Reflexes: 1+ throughout at biceps, brachioradialis, triceps, patellars and ankles bilaterally and equal. No clonus. R toe and L toe were both downgoing.  Coordination: No dysmetria on FNF or HKS  Gait: deferred       I personally reviewed the below data/images/labs:      CBC Full  -  ( 17 Nov 2022 07:10 )  WBC Count : 7.47 K/uL  RBC Count : 3.77 M/uL  Hemoglobin : 11.2 g/dL  Hematocrit : 34.8 %  Platelet Count - Automated : 547 K/uL  Mean Cell Volume : 92.3 fl  Mean Cell Hemoglobin : 29.7 pg  Mean Cell Hemoglobin Concentration : 32.2 gm/dL  Auto Neutrophil # : x  Auto Lymphocyte # : x  Auto Monocyte # : x  Auto Eosinophil # : x  Auto Basophil # : x  Auto Neutrophil % : x  Auto Lymphocyte % : x  Auto Monocyte % : x  Auto Eosinophil % : x  Auto Basophil % : x            PTT - ( 17 Nov 2022 07:07 )  PTT:53.2 sec      ACC: [id number] EXAM:  CT CERVICAL SPINE                        ACC: [id number] EXAM:  CT BRAIN                          PROCEDURE DATE:  11/14/2022          INTERPRETATION:  CLINICAL STATEMENT: Trauma..    TECHNIQUE: CT of the head and cervical spine were performed without IV   contrast. 3-D/MIP images obtained    COMPARISON: None.    FINDINGS:  Head:  There are areas of low attenuation in the periventricular white matter   likely related to mild chronic microvascular ischemic changes.    There isno acute intracranial hemorrhage, parenchymal mass, mass effect   or midline shift. There is no acute territorial infarct. There is no   hydrocephalus.    The cranium is intact. The visualized paranasal sinuses are well-aerated.    Cervical spine:  Vertebral body heights intact. Grade 1 anterolisthesis C3 on C4, C4 on C5.    There is no prevertebral soft tissue swelling. The odontoid is intact.    Evaluation of the spinal canal is limited on a CT exam.  Multilevel   degenerative changes noted resulting in multilevel spinal canal stenosis   and neural foraminal narrowing.    Heterogeneous thyroid gland noted.    IMPRESSION:  No acute intracranial hemorrhage    No acute fracture cervical spine. If pain persists, follow-up MRI exam   recommended    --- End of Report ---            JUDY ALEXANDER MD; Attending Radiologist  This document has been electronically signed. Nov 14 2022 11:55AM    < end of copied text >      Clinical Impression:  Findings indicate:    Normal EEG awake / drowsy   No epileptiform pattern or seizure seen.     
Patient is a 87y old  Female who presents with a chief complaint of syncope and fall (2022 12:57)      DATE OF SERVICE: 22 @ 16:16    SUBJECTIVE / OVERNIGHT EVENTS: overnight events noted    ROS:  Resp: No cough no sputum production  CVS: No chest pain no palpitations no orthopnea  GI: no N/V/D  : no dysuria, no hematuria          MEDICATIONS  (STANDING):  aspirin enteric coated 81 milliGRAM(s) Oral daily  atorvastatin 80 milliGRAM(s) Oral at bedtime  bacitracin   Ointment 1 Application(s) Topical every 12 hours  heparin  Infusion.  Unit(s)/Hr (9 mL/Hr) IV Continuous <Continuous>  metoprolol tartrate 25 milliGRAM(s) Oral two times a day    MEDICATIONS  (PRN):  heparin   Injectable 4000 Unit(s) IV Push every 6 hours PRN For aPTT less than 40  heparin   Injectable 2000 Unit(s) IV Push every 6 hours PRN For aPTT between 40 - 57  psyllium Powder 1 Packet(s) Oral two times a day PRN constipation        CAPILLARY BLOOD GLUCOSE        I&O's Summary    15 Nov 2022 07:01  -  2022 07:00  --------------------------------------------------------  IN: 1278 mL / OUT: 2 mL / NET: 1276 mL    2022 07:01  -  2022 16:16  --------------------------------------------------------  IN: 356 mL / OUT: 0 mL / NET: 356 mL        Vital Signs Last 24 Hrs  T(C): 36.6 (2022 16:10), Max: 36.7 (15 Nov 2022 20:35)  T(F): 97.8 (2022 16:10), Max: 98 (15 Nov 2022 20:35)  HR: 60 (2022 16:10) (58 - 69)  BP: 109/63 (2022 16:10) (99/61 - 135/78)  BP(mean): --  RR: 18 (2022 16:10) (18 - 19)  SpO2: 97% (2022 16:10) (96% - 100%)    PHYSICAL EXAM:   HEENT: ANTHONY EOMI  Neck: Supple, no JVD  Lungs: clear  CVS: S1 S2 no M/R/G  Abdomen: no tenderness BS +  Neuro: AO x 3 nonfocal   Skin: healing mild abrasions face  Ext:  no edema  Msk: no joint swelling    LABS:                        11.9   6.40  )-----------( 567      ( 2022 07:02 )             37.2     11-15    137  |  104  |  17  ----------------------------<  95  4.2   |  21<L>  |  0.66    Ca    9.3      15 Nov 2022 08:04  Phos  2.8     11-15  Mg     1.8     11-15    TPro  6.3  /  Alb  3.4  /  TBili  0.4  /  DBili  x   /  AST  34  /  ALT  22  /  AlkPhos  68  11-15    PTT - ( 2022 07:02 )  PTT:62.8 sec      Urinalysis Basic - ( 2022 20:58 )    Color: Light Yellow / Appearance: Clear / S.010 / pH: x  Gluc: x / Ketone: Negative  / Bili: Negative / Urobili: Negative   Blood: x / Protein: Negative / Nitrite: Negative   Leuk Esterase: Small / RBC: 0 /hpf / WBC 3 /HPF   Sq Epi: x / Non Sq Epi: 0 /hpf / Bacteria: Negative          All consultant(s) notes reviewed and care discussed with other providers        Contact Number, Dr Mathew [phone]
C A R D I O L O G Y  **********************************     DATE OF SERVICE: 11-16-22    Patient denies chest pain or shortness of breath.   Review of symptoms otherwise negative.    MEDICATIONS:  aspirin enteric coated 81 milliGRAM(s) Oral daily  atorvastatin 80 milliGRAM(s) Oral at bedtime  bacitracin   Ointment 1 Application(s) Topical every 12 hours  heparin   Injectable 4000 Unit(s) IV Push every 6 hours PRN  heparin   Injectable 2000 Unit(s) IV Push every 6 hours PRN  heparin  Infusion.  Unit(s)/Hr IV Continuous <Continuous>  metoprolol tartrate 25 milliGRAM(s) Oral two times a day  psyllium Powder 1 Packet(s) Oral two times a day PRN  sodium chloride 0.9% Bolus 500 milliLiter(s) IV Bolus once      LABS:                        11.9   6.40  )-----------( 567      ( 16 Nov 2022 07:02 )             37.2       Hemoglobin: 11.9 g/dL (11-16 @ 07:02)  Hemoglobin: 12.2 g/dL (11-15 @ 11:17)  Hemoglobin: 11.4 g/dL (11-15 @ 08:04)  Hemoglobin: 11.4 g/dL (11-15 @ 03:07)  Hemoglobin: 13.3 g/dL (11-14 @ 11:26)      11-15    137  |  104  |  17  ----------------------------<  95  4.2   |  21<L>  |  0.66    Ca    9.3      15 Nov 2022 08:04  Phos  2.8     11-15  Mg     1.8     11-15    TPro  6.3  /  Alb  3.4  /  TBili  0.4  /  DBili  x   /  AST  34  /  ALT  22  /  AlkPhos  68  11-15    Creatinine Trend: 0.66<--, 0.52<--    COAGS: PTT - ( 16 Nov 2022 07:02 )  PTT:62.8 sec    CARDIAC MARKERS ( 14 Nov 2022 12:32 )  x     / x     / 123 U/L / x     / 14.7 ng/mL  CARDIAC MARKERS ( 14 Nov 2022 11:26 )  x     / x     / x     / x     / 11.0 ng/mL        PHYSICAL EXAM:  T(C): 36.6 (11-16-22 @ 12:34), Max: 36.7 (11-15-22 @ 20:35)  HR: 59 (11-16-22 @ 12:34) (58 - 69)  BP: 135/78 (11-16-22 @ 12:34) (99/61 - 135/78)  RR: 19 (11-16-22 @ 12:34) (18 - 19)  SpO2: 100% (11-16-22 @ 12:34) (96% - 100%)  Wt(kg): --    I&O's Summary    15 Nov 2022 07:01  -  16 Nov 2022 07:00  --------------------------------------------------------  IN: 1278 mL / OUT: 2 mL / NET: 1276 mL    16 Nov 2022 07:01  -  16 Nov 2022 12:44  --------------------------------------------------------  IN: 236 mL / OUT: 0 mL / NET: 236 mL        Gen: Appears well in NAD  HEENT:  (-)icterus (-)pallor  CV: N S1 S2 1/6 KARIS (+)2 Pulses B/l  Resp:  Clear to auscultation B/L, normal effort  GI: (+) BS Soft, NT, ND  Lymph:  (-)Edema, (-)obvious lymphadenopathy  Skin: Warm to touch, Normal turgor  Psych: Appropriate mood and affect      TELEMETRY: SB/SR 50-60s	    ECG: Sinus 81 BPM LVH with repolarization     < from: Transthoracic Echocardiogram (11.14.22 @ 15:40) >  Conclusions:  1. Normal mitral valve. Mild mitral regurgitation.  2. Moderately dilated left atrium.  LA volume index = 43  cc/m2. Increased relative wall thickness with normal left  ventricular mass index, consistent with concentric left  ventricular remodeling.Mildly decreased left ventricular  systolic function.  Overall preserved left ventricular  ejection fraction. LVEF calculated using biplane Steve's  method is 50%. Hypokinesis of the base-mid anterior, mid  anteroseptal wall, and anterolateral wall. Other walls are  normal.Moderate diastolic dysfunction (Stage II).  3. Moderate right atrial enlargement.Right ventricular  enlargement with normal right ventricular systolic  function.  4. Normal tricuspid valve. Moderate tricuspid  regurgitation.Estimated pulmonary artery systolic pressure  equals 44  mm Hg, assuming right atrial pressure equals 8  mm Hg, consistent with mild pulmonary pressures.  5. Minimal  loculated pericardial effusion adjacent to the  right atrium. No echocardiographic evidence of pericardial  tamponade.  *** No previous Echo exam.  Discussed findings with CHELSEY Hendricks    < end of copied text >    ASSESSMENT/PLAN: 87y Female hx of HTN admitted with traumatic syncope and mildly positive trop.    1. Traumatic Syncope  - Suspect due to underlying PE  - Neuro consult noted - normal EEG with no seizure  - EP eval appreciated - tele remains stable in sinus  - Orthostatics negative  - Carotid dopplers with no sig stenosis    2. Elevated Troponin  - Clinical presentation is inconsistent with an acute coronary syndrome  - In setting of acute PE  - TTE with EF 50%, mild decreased LV function with hypokinetic wall motion abnormalities noted above  - TTE findings with ?branch disease but her most likely acute event is the PE given she has no angina and walks 4 miles a day  - Since she has no calcium on her CT chest, plan is for CT Cors (ordered  cc bolus to optimize BP for CT)  - Continue medical management of poss CAD while CT pending with ASA 81mg daily, Lipitor 80mg daily, and Metoprolol 25mg BID    3. Acute PE  - Elevated D-dimer noted, CTA chest with +PE. LE dopplers neg for DVT.  - AC with hep gtt for PE per primary team    4. HTN  - Hold Enalapril given soft BP  - Continue metoprolol 25mg BID    Felix Moreno PA-C  Pager: [phone]      
C A R D I O L O G Y  **********************************     DATE OF SERVICE: 11-17-22    Patient denies chest pain or shortness of breath.   Review of symptoms otherwise negative.    MEDICATIONS:  apixaban 10 milliGRAM(s) Oral two times a day  apixaban 10 milliGRAM(s) Oral once  atorvastatin 20 milliGRAM(s) Oral at bedtime  bacitracin   Ointment 1 Application(s) Topical every 12 hours  pantoprazole    Tablet 40 milliGRAM(s) Oral before breakfast  psyllium Powder 1 Packet(s) Oral daily      LABS:                        11.2   7.47  )-----------( 547      ( 17 Nov 2022 07:10 )             34.8       Hemoglobin: 11.2 g/dL (11-17 @ 07:10)  Hemoglobin: 11.9 g/dL (11-16 @ 07:02)  Hemoglobin: 12.2 g/dL (11-15 @ 11:17)  Hemoglobin: 11.4 g/dL (11-15 @ 08:04)  Hemoglobin: 11.4 g/dL (11-15 @ 03:07)            Creatinine Trend: 0.66<--, 0.52<--    COAGS: PTT - ( 17 Nov 2022 07:07 )  PTT:53.2 sec          PHYSICAL EXAM:  T(C): 36.8 (11-17-22 @ 12:02), Max: 36.8 (11-17-22 @ 12:02)  HR: 60 (11-17-22 @ 12:02) (60 - 68)  BP: 108/60 (11-17-22 @ 12:02) (108/60 - 125/76)  RR: 18 (11-17-22 @ 12:02) (18 - 18)  SpO2: 97% (11-17-22 @ 12:02) (97% - 98%)  Wt(kg): --    I&O's Summary    16 Nov 2022 07:01  -  17 Nov 2022 07:00  --------------------------------------------------------  IN: 536 mL / OUT: 0 mL / NET: 536 mL    17 Nov 2022 07:01  -  17 Nov 2022 15:06  --------------------------------------------------------  IN: 360 mL / OUT: 0 mL / NET: 360 mL        Gen: Appears well in NAD  HEENT:  (-)icterus (-)pallor  CV: N S1 S2 1/6 KARIS (+)2 Pulses B/l  Resp:  Clear to auscultation B/L, normal effort  GI: (+) BS Soft, NT, ND  Lymph:  (-)Edema, (-)obvious lymphadenopathy  Skin: Warm to touch, Normal turgor  Psych: Appropriate mood and affect      TELEMETRY: SR 60-70s	    ECG: Sinus 81 BPM LVH with repolarization     < from: Transthoracic Echocardiogram (11.14.22 @ 15:40) >  Conclusions:  1. Normal mitral valve. Mild mitral regurgitation.  2. Moderately dilated left atrium.  LA volume index = 43  cc/m2. Increased relative wall thickness with normal left  ventricular mass index, consistent with concentric left  ventricular remodeling.Mildly decreased left ventricular  systolic function.  Overall preserved left ventricular  ejection fraction. LVEF calculated using biplane Steve's  method is 50%. Hypokinesis of the base-mid anterior, mid  anteroseptal wall, and anterolateral wall. Other walls are  normal.Moderate diastolic dysfunction (Stage II).  3. Moderate right atrial enlargement.Right ventricular  enlargement with normal right ventricular systolic  function.  4. Normal tricuspid valve. Moderate tricuspid  regurgitation.Estimated pulmonary artery systolic pressure  equals 44  mm Hg, assuming right atrial pressure equals 8  mm Hg, consistent with mild pulmonary pressures.  5. Minimal  loculated pericardial effusion adjacent to the  right atrium. No echocardiographic evidence of pericardial  tamponade.  *** No previous Echo exam.  Discussed findings with CHELSEY Hendricks    < end of copied text >    < from: CT Angio Heart and Coronaries w/ IV Cont (11.16.22 @ 15:00) >  IMPRESSION:  1. Non obstructive disease of the coronaries as detailed above.    2. Agatston calcium score of 0.    3. Atria and right ventricle are qualitatively enlarged, there is trivial   pericardial effusion, recommend correlation with echocardiogram as   clinically indicated.    --- End of Report ---    < end of copied text >      ASSESSMENT/PLAN: 87y Female hx of HTN admitted with traumatic syncope and mildly positive trop.    1. Traumatic Syncope  - Suspect due to underlying PE  - Neuro consult noted - normal EEG with no seizure  - EP eval appreciated - tele remains stable in sinus  - Orthostatics negative  - Carotid dopplers with no sig stenosis    2. Elevated Troponin  - Clinical presentation is inconsistent with an acute coronary syndrome  - In setting of acute PE  - TTE with EF 50%, mild decreased LV function with hypokinetic wall motion abnormalities noted above  - TTE findings with ?branch disease but her most likely acute event is the PE given she has no angina and walks 4 miles a day  - CT Cors with minimal nonobstructive CAD  - Can reduce Lipitor to 20mg at bedtime for HLD. No need for beta blockers (d/goyo metoprolol). Can hold ASA given patient now on AC.    3. Acute PE  - Elevated D-dimer noted, CTA chest with +PE. LE dopplers neg for DVT.  - AC with hep gtt transitioned to Eliquis per primary team    4. HTN  - Hold Enalapril given soft BP - can restart as outpatient if needed    - No further inpatient cardiac w/u planned  - Patient to f/u with her cardiologist Dr. Robert Loomis after d/c    Felix Moreno PA-C  Pager: [phone]      
C A R D I O L O G Y  **********************************     DATE OF SERVICE: 11-15-22    Patient denies chest pain or shortness of breath.   Review of systems otherwise negative.  	  MEDICATIONS:  MEDICATIONS  (STANDING):  aspirin enteric coated 81 milliGRAM(s) Oral daily  atorvastatin 80 milliGRAM(s) Oral at bedtime  bacitracin   Ointment 1 Application(s) Topical every 12 hours  heparin  Infusion.  Unit(s)/Hr (9 mL/Hr) IV Continuous <Continuous>  metoprolol tartrate 25 milliGRAM(s) Oral two times a day      LABS:	 	    CARDIAC MARKERS:  CARDIAC MARKERS ( 14 Nov 2022 12:32 )  x     / x     / 123 U/L / x     / 14.7 ng/mL  CARDIAC MARKERS ( 14 Nov 2022 11:26 )  x     / x     / x     / x     / 11.0 ng/mL                                12.2   6.77  )-----------( 621      ( 15 Nov 2022 11:17 )             38.1     Hemoglobin: 12.2 g/dL (11-15 @ 11:17)  Hemoglobin: 11.4 g/dL (11-15 @ 08:04)  Hemoglobin: 11.4 g/dL (11-15 @ 03:07)  Hemoglobin: 13.3 g/dL (11-14 @ 11:26)      11-15    137  |  104  |  17  ----------------------------<  95  4.2   |  21<L>  |  0.66    Ca    9.3      15 Nov 2022 08:04  Phos  2.8     11-15  Mg     1.8     11-15    TPro  6.3  /  Alb  3.4  /  TBili  0.4  /  DBili  x   /  AST  34  /  ALT  22  /  AlkPhos  68  11-15    Creatinine Trend: 0.66<--, 0.52<--    COAGS:   PTT - ( 15 Nov 2022 11:17 )  PTT:65.1 sec    proBNP:   Lipid Profile:   HgA1c:   TSH:       PHYSICAL EXAM:  T(C): 36.8 (11-15-22 @ 11:14), Max: 36.8 (11-15-22 @ 05:34)  HR: 71 (11-15-22 @ 11:14) (55 - 74)  BP: 96/54 (11-15-22 @ 11:14) (94/48 - 124/79)  RR: 18 (11-15-22 @ 15:15) (18 - 20)  SpO2: 98% (11-15-22 @ 15:15) (96% - 100%)  Wt(kg): --  I&O's Summary    15 Nov 2022 07:01  -  15 Nov 2022 16:07  --------------------------------------------------------  IN: 780 mL / OUT: 0 mL / NET: 780 mL          Gen: Appears well in NAD  HEENT:  (-)icterus (-)pallor  CV: N S1 S2 1/6 KARIS (+)2 Pulses B/l  Resp:  Clear to auscultation B/L, normal effort  GI: (+) BS Soft, NT, ND  Lymph:  (-)Edema, (-)obvious lymphadenopathy  Skin: Warm to touch, Normal turgor  Psych: Appropriate mood and affect      TELEMETRY: SB/SR 50-60s	    ECG: Sinus 81 BPM LVH with repolarization     < from: Transthoracic Echocardiogram (11.14.22 @ 15:40) >  Conclusions:  1. Normal mitral valve. Mild mitral regurgitation.  2. Moderately dilated left atrium.  LA volume index = 43  cc/m2. Increased relative wall thickness with normal left  ventricular mass index, consistent with concentric left  ventricular remodeling.Mildly decreased left ventricular  systolic function.  Overall preserved left ventricular  ejection fraction. LVEF calculated using biplane Steve's  method is 50%. Hypokinesis of the base-mid anterior, mid  anteroseptal wall, and anterolateral wall. Other walls are  normal.Moderate diastolic dysfunction (Stage II).  3. Moderate right atrial enlargement.Right ventricular  enlargement with normal right ventricular systolic  function.  4. Normal tricuspid valve. Moderate tricuspid  regurgitation.Estimated pulmonary artery systolic pressure  equals 44  mm Hg, assuming right atrial pressure equals 8  mm Hg, consistent with mild pulmonary pressures.  5. Minimal  loculated pericardial effusion adjacent to the  right atrium. No echocardiographic evidence of pericardial  tamponade.  *** No previous Echo exam.  Discussed findings with CHELSEY Hendricks    < end of copied text >    ASSESSMENT/PLAN: 87y Female hx of HTN admitted with traumatic syncope and mildly positive trop.    - Clinical presentation is inconsistent with an acute coronary syndrome  - Elevated D-dimer noted, CTA chest with +PE. LE dopplers neg for DVT.  - AC with hep gtt for PE per primary team  - Suspect syncope due to underlying PE  - Neuro consult noted  - Orthostatics negative  - EP eval appreciated - tele stable in sinus  - TTE with EF 50%, mild decreased LV function with hypokinetic wall motion abnormalities noted above  - Hold Enalapril given soft BP  - Continue ASA/Statin/Metoprolol for now  - Check CT cors given abnormal echo  - Further recs pending above    CHELSEY Stoner-C  Pager: [phone]      
EP    DATE OF SERVICE: 11-15-22    HISTORY OF PRESENT ILLNESS: HPI:  Patient is an 86 y/o Female with PMH of HTN who is admitted after a syncopal episode and found to have an elevated troponin. EP consulted for further evaluation. Patient states she was moving her car out of her garage, got out of the car and doesn't remember falling but waking up on the ground. She sustained abrasions to her lips and nose and chipped two teeth. She had no warning signs this time. No preceding dizziness, chest pain, SOB, palpitations, n/v, or abd pain. She walks 4 miles daily without chest pain or SOB. Pt reports one prior episode of LOC when she was diagnosed with a UTI years ago. States had a COVID booster dose 2 weeks ago.    Feeling well overnight, no angina, palpitations, orthopnea or nausea.    PAST MEDICAL & SURGICAL HISTORY:  HTN (hypertension)  UTI (urinary tract infection), bacterial  2019 novel coronavirus disease (COVID-19)  in 2020 March  No significant past surgical history    aspirin enteric coated 81 milliGRAM(s) Oral daily  atorvastatin 80 milliGRAM(s) Oral at bedtime  bacitracin   Ointment 1 Application(s) Topical every 12 hours  heparin   Injectable 4000 Unit(s) IV Push every 6 hours PRN  heparin   Injectable 2000 Unit(s) IV Push every 6 hours PRN  heparin  Infusion.  Unit(s)/Hr IV Continuous <Continuous>  metoprolol tartrate 25 milliGRAM(s) Oral two times a day  psyllium Powder 1 Packet(s) Oral two times a day PRN                            12.2   6.77  )-----------( 621      ( 15 Nov 2022 11:17 )             38.1       11-15    137  |  104  |  17  ----------------------------<  95  4.2   |  21<L>  |  0.66    Ca    9.3      15 Nov 2022 08:04  Phos  2.8     11-15  Mg     1.8     11-15    TPro  6.3  /  Alb  3.4  /  TBili  0.4  /  DBili  x   /  AST  34  /  ALT  22  /  AlkPhos  68  11-15      CARDIAC MARKERS ( 14 Nov 2022 12:32 )  x     / x     / 123 U/L / x     / 14.7 ng/mL  CARDIAC MARKERS ( 14 Nov 2022 11:26 )  x     / x     / x     / x     / 11.0 ng/mL    T(C): 36.8 (11-15-22 @ 11:14), Max: 36.8 (11-15-22 @ 05:34)  HR: 71 (11-15-22 @ 11:14) (55 - 74)  BP: 96/54 (11-15-22 @ 11:14) (94/48 - 124/79)  RR: 18 (11-15-22 @ 11:14) (18 - 20)  SpO2: 99% (11-15-22 @ 11:14) (96% - 100%)  Wt(kg): --    I&O's Summary    15 Nov 2022 07:01  -  15 Nov 2022 14:10  --------------------------------------------------------  IN: 360 mL / OUT: 0 mL / NET: 360 mL    Gen: Appears well in NAD  HEENT:  (-)icterus (-)pallor  CV: N S1 S2 1/6 KARIS (+)2 Pulses B/l  Resp:  Clear to auscultation B/L, normal effort  GI: (+) BS Soft, NT, ND  Lymph:  (-)Edema, (-)obvious lymphadenopathy  Skin: Warm to touch, Normal turgor  Psych: Appropriate mood and affect      TELEMETRY: NSR 70s	      ECG: NSR, narrow QRS, diffuse ST depressions, aVR and aVL with 1mm ST elevation 	    RADIOLOGY:         CXR:     ASSESSMENT/PLAN: 88yo woman with new, unexplained syncopal episode occuring while getting up out of her car.  Hx of hypertension.  Admitting diagnosis is NSTEMI.    Syncope - Cardiac syncope in the setting of acute PE.  Heparin anticoagulation. Anticipate transition to treatment dose of Apixaban or Xarelto prior to discharge.  Should discuss w/ pharmacy the details of the loading and 6-month treatment dose in the setting of advanced age and low body weight.  Hypertension- continue home dose of enalapril.  NSTEMI- positive troponin x 2 in the setting of normal kidney function, ischemic-looking EKG (ST depression in II,III,aVF, V4-V6, and 1mm ST elevation in aVR and aVL). and wall motion abnormality on echocardiogram.  Awaiting CT-Coronary Angio.  If this is branch-vessel disease, medical management is reasonable.  Pulmonary embolus - Heparin infusion.  BP maintaining in normal range, and normoxic at rest.  Not requesting invasive management.    Will follow with you.    Alpesh Meza M.D.  Cardiac Electrophysiology  [phone]
EP ATTENDING    tele: NSR, no events    she denies palpitations, syncope, nor angina, ROS otherwise -     DATE OF SERVICE - 11-16-22     Review of Systems:   Constitutional: [ ] fevers, [ ] chills.   Skin: [ ] dry skin. [ ] rashes.  Psychiatric: [ ] depression, [ ] anxiety.   Gastrointestinal: [ ] BRBPR, [ ] melena.   Neurological: [ ] confusion. [ ] seizures. [ ] shuffling gait.   Ears,Nose,Mouth and Throat: [ ] ear pain [ ] sore throat.   Eyes: [ ] diplopia.   Respiratory: [ ] hemoptysis. [ ] shortness of breath  Cardiovascular: See HPI above  Hematologic/Lymphatic: [ ] anemia. [ ] painful nodes. [ ] prolonged bleeding.   Genitourinary: [ ] hematuria. [ ] flank pain.   Endocrine: [ ] significant change in weight. [ ] intolerance to heat and cold.     Review of systems [ x] otherwise negative, [ ] otherwise unable to obtain    FH: no family history of sudden cardiac death in first degree relatives    SH: [ ] tobacco, [ ] alcohol, [ ] drugs    aspirin enteric coated 81 milliGRAM(s) Oral daily  atorvastatin 80 milliGRAM(s) Oral at bedtime  bacitracin   Ointment 1 Application(s) Topical every 12 hours  heparin   Injectable 4000 Unit(s) IV Push every 6 hours PRN  heparin   Injectable 2000 Unit(s) IV Push every 6 hours PRN  heparin  Infusion.  Unit(s)/Hr IV Continuous <Continuous>  metoprolol tartrate 25 milliGRAM(s) Oral two times a day  psyllium Powder 1 Packet(s) Oral two times a day PRN  sodium chloride 0.9% Bolus 500 milliLiter(s) IV Bolus once                            11.9   6.40  )-----------( 567      ( 16 Nov 2022 07:02 )             37.2       11-15    137  |  104  |  17  ----------------------------<  95  4.2   |  21<L>  |  0.66    Ca    9.3      15 Nov 2022 08:04  Phos  2.8     11-15  Mg     1.8     11-15    TPro  6.3  /  Alb  3.4  /  TBili  0.4  /  DBili  x   /  AST  34  /  ALT  22  /  AlkPhos  68  11-15      CARDIAC MARKERS ( 14 Nov 2022 12:32 )  x     / x     / 123 U/L / x     / 14.7 ng/mL  CARDIAC MARKERS ( 14 Nov 2022 11:26 )  x     / x     / x     / x     / 11.0 ng/mL        T(C): 36.6 (11-16-22 @ 12:34), Max: 36.7 (11-15-22 @ 20:35)  HR: 59 (11-16-22 @ 12:34) (58 - 69)  BP: 135/78 (11-16-22 @ 12:34) (99/61 - 135/78)  RR: 19 (11-16-22 @ 12:34) (18 - 19)  SpO2: 100% (11-16-22 @ 12:34) (96% - 100%)  Wt(kg): --    I&O's Summary    15 Nov 2022 07:01  -  16 Nov 2022 07:00  --------------------------------------------------------  IN: 1278 mL / OUT: 2 mL / NET: 1276 mL    16 Nov 2022 07:01  -  16 Nov 2022 12:57  --------------------------------------------------------  IN: 236 mL / OUT: 0 mL / NET: 236 mL        General: Well nourished in no acute distress. Alert and Oriented * 3.   Head: Normocephalic and atraumatic.   Neck: No JVD. No bruits. Supple. Does not appear to be enlarged.   Cardiovascular: + S1,S2 ; RRR Soft systolic murmur at the left lower sternal border. No rubs noted.    Lungs: CTA b/l. No rhonchi, rales or wheezes.   Abdomen: + BS, soft. Non tender. Non distended. No rebound. No guarding.   Extremities: No clubbing/cyanosis/edema.   Neurologic: Moves all four extremities. Full range of motion.   Skin: Warm and moist. The patient's skin has normal elasticity and good skin turgor.   Psychiatric: Appropriate mood and affect.  Musculoskeletal: Normal range of motion, normal strength      echo: LVEF 50% with regional wall motion abnormalities  ekg: NSR, narrow QRS, diffuse inferolateral Twi with ST depressions      A/P) 88 y/o female with hypertension a/w sudden syncope. Echo EF 50%, EKG NSR narrow QRS. CTPA showed pulmonary emboli    -continue asa and lipitor for assumed CAD  -continue metoprolol for hypertension  -anticoagulation for PE as per primary team  -ischemia workup as per cardiology  -no further inpatient EP workup expected      Robson Avila M.D., [facility]  Cardiac Electrophysiology  [facility]  [address] [place] [address], [place] [address]  [email or web address]    office [phone]  pager [phone]  
Patient is a 87y old  Female who presents with a chief complaint of syncope and fall (15 Nov 2022 14:10)      DATE OF SERVICE: 11-15-22 @ 15:19    SUBJECTIVE / OVERNIGHT EVENTS: overnight events noted  "I feel fine'     ROS:  Resp: No cough no sputum production  CVS: No chest pain no palpitations no orthopnea  GI: no N/V/D  : no dysuria, no hematuria  Neuro: no weakness no paresthesias  Heme: No petechiae no easy bruising          MEDICATIONS  (STANDING):  aspirin enteric coated 81 milliGRAM(s) Oral daily  atorvastatin 80 milliGRAM(s) Oral at bedtime  bacitracin   Ointment 1 Application(s) Topical every 12 hours  heparin  Infusion.  Unit(s)/Hr (9 mL/Hr) IV Continuous <Continuous>  metoprolol tartrate 25 milliGRAM(s) Oral two times a day    MEDICATIONS  (PRN):  heparin   Injectable 4000 Unit(s) IV Push every 6 hours PRN For aPTT less than 40  heparin   Injectable 2000 Unit(s) IV Push every 6 hours PRN For aPTT between 40 - 57  psyllium Powder 1 Packet(s) Oral two times a day PRN constipation        CAPILLARY BLOOD GLUCOSE        I&O's Summary    15 Nov 2022 07:01  -  15 Nov 2022 15:19  --------------------------------------------------------  IN: 780 mL / OUT: 0 mL / NET: 780 mL        Vital Signs Last 24 Hrs  T(C): 36.8 (15 Nov 2022 11:14), Max: 36.8 (15 Nov 2022 05:34)  T(F): 98.3 (15 Nov 2022 11:14), Max: 98.3 (15 Nov 2022 11:14)  HR: 71 (15 Nov 2022 11:14) (55 - 74)  BP: 96/54 (15 Nov 2022 11:14) (94/48 - 124/79)  BP(mean): --  RR: 18 (15 Nov 2022 11:14) (18 - 20)  SpO2: 99% (15 Nov 2022 11:14) (96% - 100%)    PHYSICAL EXAM:   HEENT: ANTHONY EOMI  Neck: Supple, no JVD  Lungs: clear  CVS: S1 S2 no M/R/G  Abdomen: no tenderness BS +  Neuro: AO x 3 nonfocal   Skin: healing mild abrasions both lips and tip of nose  Ext:  no edema  Msk: no joint swelling      LABS:                        12.2   6.77  )-----------( 621      ( 15 Nov 2022 11:17 )             38.1     11-15    137  |  104  |  17  ----------------------------<  95  4.2   |  21<L>  |  0.66    Ca    9.3      15 Nov 2022 08:04  Phos  2.8     11-15  Mg     1.8     11-15    TPro  6.3  /  Alb  3.4  /  TBili  0.4  /  DBili  x   /  AST  34  /  ALT  22  /  AlkPhos  68  11-15    PT/INR - ( 2022 11:26 )   PT: 12.7 sec;   INR: 1.10 ratio         PTT - ( 15 Nov 2022 11:17 )  PTT:65.1 sec  CARDIAC MARKERS ( 2022 12:32 )  x     / x     / 123 U/L / x     / 14.7 ng/mL  CARDIAC MARKERS ( 2022 11:26 )  x     / x     / x     / x     / 11.0 ng/mL      Urinalysis Basic - ( 2022 20:58 )    Color: Light Yellow / Appearance: Clear / S.010 / pH: x  Gluc: x / Ketone: Negative  / Bili: Negative / Urobili: Negative   Blood: x / Protein: Negative / Nitrite: Negative   Leuk Esterase: Small / RBC: 0 /hpf / WBC 3 /HPF   Sq Epi: x / Non Sq Epi: 0 /hpf / Bacteria: Negative          All consultant(s) notes reviewed and care discussed with other providers        Contact Number, Dr Mathew [phone]
Patient is a 87y old  Female who presents with a chief complaint of syncope and fall (17 Nov 2022 15:06)      DATE OF SERVICE: 11-17-22 @ 15:22    SUBJECTIVE / OVERNIGHT EVENTS: overnight events noted    ROS:  Resp: No cough no sputum production  CVS: No chest pain no palpitations no orthopnea  GI: no N/V/D  : no dysuria, no hematuria  "I feel fine'         MEDICATIONS  (STANDING):  apixaban 10 milliGRAM(s) Oral two times a day  apixaban 10 milliGRAM(s) Oral once  atorvastatin 20 milliGRAM(s) Oral at bedtime  bacitracin   Ointment 1 Application(s) Topical every 12 hours  pantoprazole    Tablet 40 milliGRAM(s) Oral before breakfast  psyllium Powder 1 Packet(s) Oral daily    MEDICATIONS  (PRN):        CAPILLARY BLOOD GLUCOSE        I&O's Summary    16 Nov 2022 07:01  -  17 Nov 2022 07:00  --------------------------------------------------------  IN: 536 mL / OUT: 0 mL / NET: 536 mL    17 Nov 2022 07:01  -  17 Nov 2022 15:22  --------------------------------------------------------  IN: 560 mL / OUT: 0 mL / NET: 560 mL        Vital Signs Last 24 Hrs  T(C): 36.8 (17 Nov 2022 12:02), Max: 36.8 (17 Nov 2022 12:02)  T(F): 98.2 (17 Nov 2022 12:02), Max: 98.2 (17 Nov 2022 12:02)  HR: 60 (17 Nov 2022 12:02) (60 - 68)  BP: 108/60 (17 Nov 2022 12:02) (108/60 - 125/76)  BP(mean): --  RR: 18 (17 Nov 2022 12:02) (18 - 18)  SpO2: 97% (17 Nov 2022 12:02) (97% - 98%)    PHYSICAL EXAM:   HEENT: ANTHONY EOMI  Neck: Supple, no JVD  Lungs: clear  CVS: S1 S2 no M/R/G  Abdomen: no tenderness BS +  Neuro: AO x 3 nonfocal   Skin: healing abrasions face  Ext:  no edema  Msk: no joint swelling    LABS:                        11.2   7.47  )-----------( 547      ( 17 Nov 2022 07:10 )             34.8           PTT - ( 17 Nov 2022 07:07 )  PTT:53.2 sec            All consultant(s) notes reviewed and care discussed with other providers        Contact Number, Dr Mathew [phone]

## 2022-11-17 NOTE — CONSULT NOTE ADULT - SUBJECTIVE AND OBJECTIVE BOX
[facility]  Bruce Pascual PA-C  [address] [place] [id number] [phone]      Chief Complaint:  Patient is a 87y old  Female who presents with a chief complaint of syncope and fall (17 Nov 2022 11:53)      HPI: 87yoF PMH HTN presents after an unwitnessed fall. Pt states she was moving her car out of her garage, got out of the car and doesn't remember falling. She sustained abrasions to her lips and nose. After a few minutes she remembers walking to the garage door and then having a conversation with a passer by. She does remember going back to her apartment. Pt doesn't remember if she hit her head, but hit her mouth and chipped two teeth. She takes ASA daily. Denies preceding dizziness, chest pain, SOB, abdominal pain N/V/D, dysuria, hematuria. Pt reports one prior episode of LOC when she was diagnosed with a UTI a while back. States had a COVID booster dose 2 weeks ago. (14 Nov 2022 13:22)    GI asked to consult for abdominal pain   -pt seen and examined, reports she typically experience don/off abdominal pain when she doesn't have a BM  -has been following with GI Dr. Saundra Lanza, was told she has IBS with constipation  -typically she takes metamucil 1x a day and that manages her symptoms   -had heart burn last night because she is constipated, her last BM was 2 days ago. States she has not been given her metamucil here  -CT a/p with moderate stool burden    Allergies:  penicillin (Unknown)      Medications:  apixaban 10 milliGRAM(s) Oral two times a day  apixaban 10 milliGRAM(s) Oral once  atorvastatin 20 milliGRAM(s) Oral at bedtime  bacitracin   Ointment 1 Application(s) Topical every 12 hours  psyllium Powder 1 Packet(s) Oral two times a day PRN      PMHX/PSHX:  HTN (hypertension)    UTI (urinary tract infection), bacterial    2019 novel coronavirus disease (COVID-19)    No significant past surgical history        Family history:  No pertinent family history in first degree relatives        Social History:     ROS:     General:  No wt loss, fevers, chills, night sweats, fatigue,   Eyes:  Good vision, no reported pain  ENT:  No sore throat, pain, runny nose, dysphagia  CV:  No pain, palpitations, hypo/hypertension  Resp:  No dyspnea, cough, tachypnea, wheezing  GI:  No pain, No nausea, No vomiting, No diarrhea, + constipation, No weight loss, No fever, No pruritis, No rectal bleeding, No tarry stools, No dysphagia,  :  No pain, bleeding, incontinence, nocturia  Muscle:  No pain, weakness  Neuro:  No weakness, tingling, memory problems  Psych:  No fatigue, insomnia, mood problems, depression  Endocrine:  No polyuria, polydipsia, cold/heat intolerance  Heme:  No petechiae, ecchymosis, easy bruisability  Skin:  No rash, tattoos, scars, edema      PHYSICAL EXAM:   Vital Signs:  Vital Signs Last 24 Hrs  T(C): 36.8 (17 Nov 2022 12:02), Max: 36.8 (17 Nov 2022 12:02)  T(F): 98.2 (17 Nov 2022 12:02), Max: 98.2 (17 Nov 2022 12:02)  HR: 60 (17 Nov 2022 12:02) (59 - 68)  BP: 108/60 (17 Nov 2022 12:02) (108/60 - 135/78)  BP(mean): --  RR: 18 (17 Nov 2022 12:02) (18 - 19)  SpO2: 97% (17 Nov 2022 12:02) (97% - 100%)    Parameters below as of 17 Nov 2022 12:02  Patient On (Oxygen Delivery Method): room air      Daily     Daily     GENERAL:  Appears stated age,   HEENT:  NC/AT,    CHEST:  Full & symmetric excursion,   HEART:  Regular rhythm  ABDOMEN:  Soft, non-tender, non-distended,   EXTEREMITIES:  no cyanosis,clubbing or edema  SKIN:  No rash  NEURO:  Alert,    LABS:                        11.2   7.47  )-----------( 547      ( 17 Nov 2022 07:10 )             34.8             PTT - ( 17 Nov 2022 07:07 )  PTT:53.2 sec        Imaging:  < from: CT Abdomen and Pelvis w/ Oral Cont and w/ IV Cont (11.16.22 @ 21:34) >    ACC: [id number] EXAM:  CT ABDOMEN AND PELVIS OC IC                          PROCEDURE DATE:  11/16/2022          INTERPRETATION:  CLINICAL INFORMATION: Unexplained DVT. Evaluate   intra-abdominal pathology.    COMPARISON: None CT chest 11/15/2022.    CONTRAST/COMPLICATIONS:  IV Contrast: Omnipaque 350  90 cc administered   10 cc discarded  Oral Contrast: Smoothie Readi-Cat 2  Complications: None reported at time of study completion    PROCEDURE:  CT of the Abdomen and Pelvis was performed.  Sagittal and coronal reformats were performed.    FINDINGS:  LOWER CHEST: Mild scattered and bibasilar linear atelectasis.   Cardiomegaly.    LIVER: Within normal limits.  BILE DUCTS: Mild intrahepatic and extrahepatic biliary ductal dilatation   appears to be secondary to cholecystectomy.  GALLBLADDER: Cholecystectomy.  SPLEEN: Within normal limits.  PANCREAS: Within normal limits. Hepatic duct measures upper normal in   caliber.  ADRENALS: Within normal limits.  KIDNEYS/URETERS: Symmetric enhancementof the kidneys. No hydronephrosis.   Centimeters hypodensity lower pole right kidney is too small to   characterize.    BLADDER: Contrast-filled bladder. Mild degree of bladder prolapse as   bladder sits below the upper margin of the pubic symphysis.  REPRODUCTIVE ORGANS: No pelvic masses.    BOWEL: No bowel obstruction. Appendix is not visualized. No evidence of   inflammation in the pericecal region. Moderate stool burden.  PERITONEUM: No ascites.  VESSELS: Atherosclerotic changes.  RETROPERITONEUM/LYMPH NODES: No lymphadenopathy. Minimal presacral edema.  ABDOMINAL WALL: Small fat-containing bilateral inguinal hernia.  BONES: Redemonstrated T12 superior endplate Compression deformity.   Degenerative changes. Mild anterolisthesis of L4 on L5. Tarlov sacral   cysts.    IMPRESSION:  No acute intra-abdominal pathology.        --- End of Report ---          IOANA MOSES MD; Resident Radiologist  This document has been electronically signed.  YOCASTA GOMEZ MD; Attending Radiologist  This document has been electronically signed. Nov 17 2022 10:35AM    < end of copied text >

## 2022-11-17 NOTE — CONSULT NOTE ADULT - ASSESSMENT
constipation   IBS  Acute pulmonary thromboembolism    CT a/p with moderate stool burden   pt refusing bowel regimen aside from metamucil   metamucil daily  PPI daily   cont full dose AC  outpatient f/u with primary GI   no GI objection to dc planning   dw pt     Advanced care planning was discussed with patient and family.  Advanced care planning forms were reviewed and discussed.  Risks, benefits and alternatives of gastroenterologic procedures were discussed in detail and all questions were answered.    30 minutes spent.

## 2022-11-17 NOTE — PROGRESS NOTE ADULT - PROVIDER SPECIALTY LIST ADULT
Cardiology
Electrophysiology
Electrophysiology
Cardiology
Neurology
Cardiology
Electrophysiology
Internal Medicine

## 2022-11-17 NOTE — DISCHARGE NOTE NURSING/CASE MANAGEMENT/SOCIAL WORK - NSDCPEFALRISK_GEN_ALL_CORE
For information on Fall & Injury Prevention, visit: [email or web address] OR  [email or web address] OR  https://www.cdc.gov/steadi/patient.html

## 2022-11-17 NOTE — PROGRESS NOTE ADULT - REASON FOR ADMISSION
syncope and fall

## 2022-11-17 NOTE — DISCHARGE NOTE PROVIDER - HOSPITAL COURSE
87yoF PMH HTN presents after an unwitnessed fall clinical presentation consistent with syncope of unclear etiology possibly arrythmia and NSTEMI    ·  Problem: Acute pulmonary thromboembolism.   ·  Plan: continue full AC with IV heparin  pulmonary consultation:  	pe on cta  : dopplers are negative: : echo with mild pulm htn:   	has small loculated pl effusion :  	ct chest also shows mild lymphadenopathy :  	ct abd with no malignancy:  	These LN needs to be followed up closely as an outpt   and may need pets scan as an outpt:  given unprovoked PE: follow up with Dr Lovelace  bilateral venous duplex to r/o DVT negative  heme evaluation for mild chronic thrombocytopenia at patient's request (called)> pt follows with a hematologist at [facility], continue followup with them   CT abd added on too coronary CT.    ·  Problem: Non-ST elevation MI (NSTEMI).   ·  Plan: discussed with cardiology   echocardiogram with mild dysfunction   coronary CT>  CTA Heart : Minimal to no coronary artery plaque.    ·  Problem: Syncope.   ·  Plan: likely secondary to PE  fall precautions  echocardiogram no valvular abnormality.    ·  Problem: HTN (hypertension).   ·  Plan: continue metoprolol with hold parameters.    ·  Problem: Constipation.   ·  Plan: continue metamucil PRN.   87yoF PMH HTN presents after an unwitnessed fall clinical presentation consistent with syncope of likely secondary to acute PE, NSTEMI ruled out after admission      Nutritional Assessment:  · Nutritional Assessment	This patient has been assessed with a concern for Malnutrition and has been determined to have a diagnosis/diagnoses of Underweight (BMI < 19).    This patient is being managed with:   Diet Regular-  Entered: Nov 14 2022  1:27PM     Problem/Plan - 1:  ·  Problem: Acute pulmonary thromboembolism.   ·  Plan: now on apixaban load  cleared by cardiology for same  no further intervention at this time at this time  bilateral venous duplex to r/o DVT negative  discussed with heme attending  no contraindication to discharge for outpatient follow up with outpatient heme  will need 6 months.  outpatient PET scan     Problem/Plan - 2:  ·  Problem: Elevated troponin.   ·  Plan: NSTEMI ruled out after admission  elevated trop likely secondary to PE and right ventricular stretch  coronary CT negative for significant CAD  will continue to monitor as outpatient with outpatient cardiology.     Problem/Plan - 3:  ·  Problem: Syncope.   ·  Plan: likely secondary to PE  echocardiogram no valvular abnormality.     Problem/Plan - 4:  ·  Problem: HTN (hypertension).   ·  Plan: discontinue all BP meds  will continue to monitor as outpatient.     Problem/Plan - 5:  ·  Problem: Constipation.   ·  Plan: continue metamucil.

## 2022-11-17 NOTE — DISCHARGE NOTE PROVIDER - NSDCMRMEDTOKEN_GEN_ALL_CORE_FT
Aspirin Enteric Coated 81 mg oral delayed release tablet: 1 tab(s) orally once a day  atorvastatin 20 mg oral tablet: 1 tab(s) orally once a day (at bedtime)  Eliquis Starter Pack for Treatment of DVT and PE 5 mg oral tablet: 2 tab(s) orally 2 times a day   Metamucil 3.4 g/3.7 g oral powder for reconstitution: 1 dose(s) orally 2 times a day  pantoprazole 40 mg oral delayed release tablet: 1 tab(s) orally once a day (before a meal)  Probiotic Formula oral capsule: 1 cap(s) orally once a day   Eliquis Starter Pack for Treatment of DVT and PE 5 mg oral tablet: 2 tab(s) orally 2 times a day for 1 week then 1 tablet 2 times a day   Metamucil 3.4 g/3.7 g oral powder for reconstitution: 1 dose(s) orally 2 times a day  pantoprazole 40 mg oral delayed release tablet: 1 tab(s) orally once a day (before a meal)  Probiotic Formula oral capsule: 1 cap(s) orally once a day

## 2022-11-17 NOTE — DISCHARGE NOTE PROVIDER - NSDCFUADDAPPT_GEN_ALL_CORE_FT
APPTS ARE READY TO BE MADE: [x ] YES    Best Family or Patient Contact (if needed):    Additional Information about above appointments (if needed):    1: Please follow up with your cardiologist in 2 weeks  2: Continue to follow up with your hematologist  3:     Other comments or requests:    APPTS ARE READY TO BE MADE: [x ] YES    Best Family or Patient Contact (if needed):    Additional Information about above appointments (if needed):    1: Please follow up with your cardiologist in 2 weeks  2: Continue to follow up with your hematologist  3:     Other comments or requests:   Patient was provided with follow up request details and was advised to call to schedule follow up within specified time frame. No scheduling assistance needed.

## 2022-11-17 NOTE — PROGRESS NOTE ADULT - PROBLEM SELECTOR PLAN 2
NSTEMI ruled out after admission  elevated trop likely secondary to PE and right ventricular stretch  coronary CT negative for significant CAD  will continue to monitor as outpatient with outpatient cardiology
discussed with cardiology   echocardiogram with mild dysfunction   awaiting coronary CT
discussed with cardiology   echocardiogram with mild dysfunction   coronary CT ordered  continue IV heparin  ASA and B Blocker   Lipitor 80 mg

## 2022-11-17 NOTE — CONSULT NOTE ADULT - CONSULT REQUESTED BY NAME
Form received at [facility] on 4/1/2019.     Please note that it takes 7-10 business days for completion.     Authorization emailed.    
Dr. Mathew
ED
Dr. Shepherd
Dr LUZ Mathew
Dr. Mathew
Dr. Mathew

## 2022-11-17 NOTE — CONSULT NOTE ADULT - PROBLEM SELECTOR RECOMMENDATION 9
? secondary to pe:  she has pe on cta  : dopplers are negative: : echo with mild pulm htn:   has small loculated pl effusion :  ct chest also shows mild lymphadenopathy :  ct abd with no malignancy:  These LN needs to be followed up closely as an outpt   and may need pets scan as an outpt:  given unprovoked PE:   cont ac

## 2022-11-17 NOTE — DISCHARGE NOTE PROVIDER - NSDCCPCAREPLAN_GEN_ALL_CORE_FT
PRINCIPAL DISCHARGE DIAGNOSIS  Diagnosis: Acute pulmonary thromboembolism  Assessment and Plan of Treatment: s/p hep gtt and transitioned to eliquis  pulmonary consultation appreciated  bilateral venous duplex to r/o DVT negative  heme evaluation for mild chronic thrombocytopenia at patient's request  Take your anticoagulation (lovenox, coumadin) as directed.  Follow up with your health care provider within one week. Call for appointment.  If you develop shortness of breath or if your shortness of breath worsens call your Health Care Provider or go to the Emergency Department.        SECONDARY DISCHARGE DIAGNOSES  Diagnosis: Non-ST elevation MI (NSTEMI)  Assessment and Plan of Treatment: Clinical presentation is inconsistent with an acute coronary syndrome  - In setting of acute PE  - TTE with EF 50%, mild decreased LV function with hypokinetic wall motion abnormalities noted above  - TTE findings with ?branch disease but her most likely acute event is the PE given she has no angina and walks 4 miles a day  - Since she has no calcium on her CT chest, plan is for CT Cors> minimal to no coronary artery plaque

## 2022-11-17 NOTE — DISCHARGE NOTE NURSING/CASE MANAGEMENT/SOCIAL WORK - NSDCVIVACCINE_GEN_ALL_CORE_FT
Tdap; 14-Nov-2022 11:07; Cara Wright (ROHAN); [employer]; [id number] (Exp. Date: 01-Jun-2024); IntraMuscular; Deltoid Right.; 0.5 milliLiter(s); VIS (VIS Published: 09-May-2013, VIS Presented: 14-Nov-2022);

## 2022-11-17 NOTE — DISCHARGE NOTE PROVIDER - PROVIDER TOKENS
PROVIDER:[TOKEN:[[id number]],FOLLOWUP:[2 weeks]],FREE:[LAST:[Colby],FIRST:[],PHONE:[(   )    -],FAX:[(   )    -],ADDRESS:[Hematology]] PROVIDER:[TOKEN:[[id number]],FOLLOWUP:[2 weeks]],FREE:[LAST:[Colby],FIRST:[Dr],PHONE:[(   )    -],FAX:[(   )    -],ADDRESS:[Hematology]],PROVIDER:[TOKEN:[[id number]]] PROVIDER:[TOKEN:[[id number]],FOLLOWUP:[2 weeks]],PROVIDER:[TOKEN:[[id number]]],FREE:[LAST:[Colby],FIRST:[],PHONE:[(   )    -],FAX:[(   )    -],ADDRESS:[[facility]]],PROVIDER:[TOKEN:[[id number]],FOLLOWUP:[2 weeks]] PROVIDER:[TOKEN:[[id number]],FOLLOWUP:[2 weeks]],PROVIDER:[TOKEN:[[id number]],FOLLOWUP:[2 weeks]],FREE:[LAST:[Colby],FIRST:[],PHONE:[(   )    -],FAX:[(   )    -],ADDRESS:[[facility]]],PROVIDER:[TOKEN:[[id number]],FOLLOWUP:[2 weeks]]

## 2022-12-09 RX ORDER — ASPIRIN/CALCIUM CARB/MAGNESIUM 324 MG
1 TABLET ORAL
Qty: 0 | Refills: 0 | DISCHARGE

## 2022-12-12 ENCOUNTER — TRANSCRIPTION ENCOUNTER (OUTPATIENT)
Age: 87
End: 2022-12-12

## 2022-12-20 PROBLEM — I10 ESSENTIAL (PRIMARY) HYPERTENSION: Chronic | Status: ACTIVE | Noted: 2022-11-14

## 2022-12-20 PROBLEM — U07.1 COVID-19: Chronic | Status: ACTIVE | Noted: 2022-11-14

## 2022-12-20 PROBLEM — N39.0 URINARY TRACT INFECTION, SITE NOT SPECIFIED: Chronic | Status: ACTIVE | Noted: 2022-11-14

## 2023-01-23 ENCOUNTER — APPOINTMENT (OUTPATIENT)
Dept: PULMONOLOGY | Facility: CLINIC | Age: 88
End: 2023-01-23
Payer: MEDICARE

## 2023-01-23 VITALS — HEART RATE: 82 BPM | DIASTOLIC BLOOD PRESSURE: 76 MMHG | SYSTOLIC BLOOD PRESSURE: 165 MMHG | OXYGEN SATURATION: 96 %

## 2023-01-23 DIAGNOSIS — Z87.891 PERSONAL HISTORY OF NICOTINE DEPENDENCE: ICD-10-CM

## 2023-01-23 DIAGNOSIS — R09.02 HYPOXEMIA: ICD-10-CM

## 2023-01-23 DIAGNOSIS — I27.20 PULMONARY HYPERTENSION, UNSPECIFIED: ICD-10-CM

## 2023-01-23 DIAGNOSIS — I26.99 OTHER PULMONARY EMBOLISM W/OUT ACUTE COR PULMONALE: ICD-10-CM

## 2023-01-23 LAB — POCT - HEMOGLOBIN (HGB), QUANTITATIVE, TRANSCUTANEOUS: 10.2

## 2023-01-23 PROCEDURE — 88738 HGB QUANT TRANSCUTANEOUS: CPT

## 2023-01-23 PROCEDURE — 94762 N-INVAS EAR/PLS OXIMTRY CONT: CPT | Mod: 59

## 2023-01-23 PROCEDURE — ZZZZZ: CPT

## 2023-01-23 PROCEDURE — 99205 OFFICE O/P NEW HI 60 MIN: CPT | Mod: 25

## 2023-01-23 PROCEDURE — 71046 X-RAY EXAM CHEST 2 VIEWS: CPT

## 2023-01-23 PROCEDURE — 94010 BREATHING CAPACITY TEST: CPT

## 2023-01-23 PROCEDURE — 94618 PULMONARY STRESS TESTING: CPT

## 2023-01-23 PROCEDURE — 94729 DIFFUSING CAPACITY: CPT

## 2023-01-23 PROCEDURE — 94727 GAS DIL/WSHOT DETER LNG VOL: CPT

## 2023-01-23 RX ORDER — APIXABAN 5 MG/1
5 TABLET, FILM COATED ORAL
Qty: 180 | Refills: 0 | Status: ACTIVE | COMMUNITY
Start: 2023-01-23

## 2023-01-23 NOTE — DISCUSSION/SUMMARY
[FreeTextEntry1] : Pulmonary embolism initial diagnosis November 14, 2022\par Mild mediastinal subcarinal adenopathy rule out reactive\par History polycythemia\par Exercise-induced hypoxemia\par By definition unprovoked pulmonary embolism in patient\par Patient is not high risk for falls\par Recommendations\par Complete minimum 6-month treatment protocol with Eliquis 5 mg twice daily \par At 3 months readdress CT chest with and without contrast to reevaluate for the adenopathy and clearing of the pulmonary embolism if patient is undergoing a study\par Question the pulmonary hypertension etiology is not secondary to pulmonary embolism and is the exercise-induced hypoxemia secondary to pulmonary embolism\par Other concern is whether patient has had although asymptomatic exercise-induced hypoxemia or nocturnal hypoxemia longstanding as an etiology of the mild pulmonary hypertension\par Therefore in the edition to the recommendation for the short-term interval follow-up chest CT\par Overnight oximetry ordered\par Patient is hesitant but did discuss and will reevaluate with patient and daughter indications for oxygen therapy after completion of the overnight oximetry\par Additional work-up can include hypercoagulable work-up\par Will check with patient's hematologist Dr. Bowman\par Patient will also need a repeat echocardiogram

## 2023-01-23 NOTE — PHYSICAL EXAM
[No Acute Distress] : no acute distress [Normal Oropharynx] : normal oropharynx [I] : Mallampati Class: I [Normal Appearance] : normal appearance [Supple] : supple [No JVD] : no jvd [Normal Rate/Rhythm] : normal rate/rhythm [Normal S1, S2] : normal s1, s2 [No Rubs] : no rubs [No Murmurs] : no murmurs [No Gallops] : no gallops [No Resp Distress] : no resp distress [No Acc Muscle Use] : no acc muscle use [Normal Palpation] : normal palpation [Normal Rhythm and Effort] : normal rhythm and effort [Clear to Auscultation Bilaterally] : clear to auscultation bilaterally [Normal to Percussion] : normal to percussion [No Abnormalities] : no abnormalities [Benign] : benign [Not Tender] : not tender [Soft] : soft [No HSM] : no hsm [Normal Bowel Sounds] : normal bowel sounds [No Clubbing] : no clubbing [Normal Gait] : normal gait [No Cyanosis] : no cyanosis [No Edema] : no edema [Normal Color/ Pigmentation] : normal color/ pigmentation [No Focal Deficits] : no focal deficits [Oriented x3] : oriented x3 [Normal Affect] : normal affect

## 2023-01-23 NOTE — PROCEDURE
[FreeTextEntry1] : Chest x-ray PA lateral January 23, 2023\par Normal cardiac size\par Some suggestion of hypoaeration\par Mild calcification aortic knob\par No parenchymal infiltrates pleural effusions or dominant pulmonary nodules\par \par PFT January 23, 2023\par Flow rates normal\par FEV1 FVC ratio 77\par Lung volumes normal\par % predicted\par Increased FRC subtle suggestion of air trapping\par Diffusion 84% predicted normal\par Hemoglobin 10.2\par (Normal exercise study\par Baseline O2 saturation 94%\par Positive moe desaturation 84 to 85% on room air\par Impression positive exercise-induced hypoxemia study\par \par Hospital data review\par CT chest angiogram protocol November 15, 2022\par Right middle lobe lung\par Distal segmental subsegmental pulmonary emboli\par Cardiomegaly\par Mediastinal adenopathy\par Right hilar lymph node 1.2 cm and subcarinal lymph node 1.6 cm\par No reported pulmonary infarct pleural effusion\par Scattered bibasilar linear atelectasis\par \par Venous Doppler lower extremity November 15, 2022\par No evidence for DVT\par \par CT abdomen pelvis November 16, 2022\par No acute intra-abdominal pathology\par

## 2023-01-23 NOTE — HISTORY OF PRESENT ILLNESS
[Former] : former [< 20 pack-years] : < 20 pack-years [TextBox_4] : 87-year-old female\par Hospitalization [facility] mid November 2022\par Diagnosis pulmonary embolism confirmed with CT angiogram protocol\par Initiating symptomatology syncope\par Patient was placed on Eliquis 5 mg twice daily at present\par Echocardiogram reported mild pulmonary hypertension\par She is managed by hematology and prior to the diagnosis of the PEs she had a history of polycythemia\par History hypertension currently on no antihypertensive medications\par History COVID-19 infection dating back to March 2020\par History of last colonoscopy 2016\par She has no active respiratory symptoms\par Denies chest pain chest tightness pleuritic chest pain\par Denies rest or exertional shortness of breath\par No purulent sputum\par No wheeze\par No history of hemoptysis\par No prior history of pneumonia tuberculosis pulmonary embolism interstitial lung disease obstructive sleep apnea until the most noted acute diagnosis of pulmonary embolism November 2022\par  [TextBox_11] : 1/2 [YearQuit] : 1974

## 2023-01-30 ENCOUNTER — NON-APPOINTMENT (OUTPATIENT)
Age: 88
End: 2023-01-30

## 2023-02-06 NOTE — DISCHARGE NOTE PROVIDER - NSDCCONDITION_GEN_ALL_CORE
Flexible bronchoscopy, Robotic assisted right vats with pneumolysis, mechanical and chemical pleurodesis, right upper lobe wedge, pleural biopsy and plication of diaphragm. FB, Robo RVATS, pneumonolysis, RUL wedge, Mechanical pleurodesis, diaphragm plication, Chemical pleurodesis, pleural biopsy.    Fenestrations seen on R diaphragm. Stable

## 2023-02-23 ENCOUNTER — APPOINTMENT (OUTPATIENT)
Dept: PULMONOLOGY | Facility: CLINIC | Age: 88
End: 2023-02-23

## 2024-06-17 ENCOUNTER — INPATIENT (INPATIENT)
Facility: HOSPITAL | Age: 89
LOS: 2 days | Discharge: HOME HEALTH | DRG: 312 | End: 2024-06-20
Attending: INTERNAL MEDICINE | Admitting: INTERNAL MEDICINE
Payer: MEDICARE

## 2024-06-17 VITALS
SYSTOLIC BLOOD PRESSURE: 178 MMHG | TEMPERATURE: 98 F | OXYGEN SATURATION: 95 % | HEART RATE: 78 BPM | RESPIRATION RATE: 20 BRPM | DIASTOLIC BLOOD PRESSURE: 96 MMHG

## 2024-06-17 DIAGNOSIS — R55 SYNCOPE AND COLLAPSE: ICD-10-CM

## 2024-06-17 LAB
ALBUMIN SERPL ELPH-MCNC: 4.4 G/DL — SIGNIFICANT CHANGE UP (ref 3.3–5)
ALP SERPL-CCNC: 76 U/L — SIGNIFICANT CHANGE UP (ref 40–120)
ALT FLD-CCNC: 24 U/L — SIGNIFICANT CHANGE UP (ref 10–45)
ANION GAP SERPL CALC-SCNC: 12 MMOL/L — SIGNIFICANT CHANGE UP (ref 5–17)
AST SERPL-CCNC: 29 U/L — SIGNIFICANT CHANGE UP (ref 10–40)
BASOPHILS # BLD AUTO: 0.04 K/UL — SIGNIFICANT CHANGE UP (ref 0–0.2)
BASOPHILS NFR BLD AUTO: 0.5 % — SIGNIFICANT CHANGE UP (ref 0–2)
BILIRUB SERPL-MCNC: 0.3 MG/DL — SIGNIFICANT CHANGE UP (ref 0.2–1.2)
BUN SERPL-MCNC: 17 MG/DL — SIGNIFICANT CHANGE UP (ref 7–23)
CALCIUM SERPL-MCNC: 10 MG/DL — SIGNIFICANT CHANGE UP (ref 8.4–10.5)
CHLORIDE SERPL-SCNC: 98 MMOL/L — SIGNIFICANT CHANGE UP (ref 96–108)
CO2 SERPL-SCNC: 24 MMOL/L — SIGNIFICANT CHANGE UP (ref 22–31)
CREAT SERPL-MCNC: 0.62 MG/DL — SIGNIFICANT CHANGE UP (ref 0.5–1.3)
EGFR: 87 ML/MIN/1.73M2 — SIGNIFICANT CHANGE UP
EOSINOPHIL # BLD AUTO: 0.09 K/UL — SIGNIFICANT CHANGE UP (ref 0–0.5)
EOSINOPHIL NFR BLD AUTO: 1.1 % — SIGNIFICANT CHANGE UP (ref 0–6)
GLUCOSE SERPL-MCNC: 96 MG/DL — SIGNIFICANT CHANGE UP (ref 70–99)
HCT VFR BLD CALC: 40.3 % — SIGNIFICANT CHANGE UP (ref 34.5–45)
HGB BLD-MCNC: 13 G/DL — SIGNIFICANT CHANGE UP (ref 11.5–15.5)
IMM GRANULOCYTES NFR BLD AUTO: 0.1 % — SIGNIFICANT CHANGE UP (ref 0–0.9)
LYMPHOCYTES # BLD AUTO: 1.14 K/UL — SIGNIFICANT CHANGE UP (ref 1–3.3)
LYMPHOCYTES # BLD AUTO: 14.1 % — SIGNIFICANT CHANGE UP (ref 13–44)
MCHC RBC-ENTMCNC: 29.2 PG — SIGNIFICANT CHANGE UP (ref 27–34)
MCHC RBC-ENTMCNC: 32.3 GM/DL — SIGNIFICANT CHANGE UP (ref 32–36)
MCV RBC AUTO: 90.6 FL — SIGNIFICANT CHANGE UP (ref 80–100)
MONOCYTES # BLD AUTO: 0.54 K/UL — SIGNIFICANT CHANGE UP (ref 0–0.9)
MONOCYTES NFR BLD AUTO: 6.7 % — SIGNIFICANT CHANGE UP (ref 2–14)
NEUTROPHILS # BLD AUTO: 6.26 K/UL — SIGNIFICANT CHANGE UP (ref 1.8–7.4)
NEUTROPHILS NFR BLD AUTO: 77.5 % — HIGH (ref 43–77)
NRBC # BLD: 0 /100 WBCS — SIGNIFICANT CHANGE UP (ref 0–0)
PLATELET # BLD AUTO: 530 K/UL — HIGH (ref 150–400)
POTASSIUM SERPL-MCNC: 4.6 MMOL/L — SIGNIFICANT CHANGE UP (ref 3.5–5.3)
POTASSIUM SERPL-SCNC: 4.6 MMOL/L — SIGNIFICANT CHANGE UP (ref 3.5–5.3)
PROT SERPL-MCNC: 7.5 G/DL — SIGNIFICANT CHANGE UP (ref 6–8.3)
RBC # BLD: 4.45 M/UL — SIGNIFICANT CHANGE UP (ref 3.8–5.2)
RBC # FLD: 14.7 % — HIGH (ref 10.3–14.5)
SODIUM SERPL-SCNC: 134 MMOL/L — LOW (ref 135–145)
TROPONIN T, HIGH SENSITIVITY RESULT: 31 NG/L — SIGNIFICANT CHANGE UP (ref 0–51)
TROPONIN T, HIGH SENSITIVITY RESULT: 55 NG/L — HIGH (ref 0–51)
WBC # BLD: 8.08 K/UL — SIGNIFICANT CHANGE UP (ref 3.8–10.5)
WBC # FLD AUTO: 8.08 K/UL — SIGNIFICANT CHANGE UP (ref 3.8–10.5)

## 2024-06-17 PROCEDURE — 99285 EMERGENCY DEPT VISIT HI MDM: CPT | Mod: GC

## 2024-06-17 PROCEDURE — 70450 CT HEAD/BRAIN W/O DYE: CPT | Mod: 26,XU,MC

## 2024-06-17 PROCEDURE — 71046 X-RAY EXAM CHEST 2 VIEWS: CPT | Mod: 26

## 2024-06-17 PROCEDURE — 70496 CT ANGIOGRAPHY HEAD: CPT | Mod: 26,MC

## 2024-06-17 PROCEDURE — 70498 CT ANGIOGRAPHY NECK: CPT | Mod: 26,MC

## 2024-06-17 RX ORDER — SODIUM CHLORIDE 0.9 % (FLUSH) 0.9 %
1000 SYRINGE (ML) INJECTION ONCE
Refills: 0 | Status: COMPLETED | OUTPATIENT
Start: 2024-06-17 | End: 2024-06-17

## 2024-06-17 RX ORDER — ENALAPRIL MALEATE 20 MG
2.5 TABLET ORAL DAILY
Refills: 0 | Status: DISCONTINUED | OUTPATIENT
Start: 2024-06-17 | End: 2024-06-18

## 2024-06-17 RX ORDER — ATENOLOL 50 MG/1
25 TABLET ORAL DAILY
Refills: 0 | Status: DISCONTINUED | OUTPATIENT
Start: 2024-06-17 | End: 2024-06-17

## 2024-06-17 RX ORDER — APIXABAN 5 MG/1
2.5 TABLET, FILM COATED ORAL EVERY 12 HOURS
Refills: 0 | Status: DISCONTINUED | OUTPATIENT
Start: 2024-06-17 | End: 2024-06-20

## 2024-06-17 RX ORDER — ESCITALOPRAM OXALATE 20 MG/1
10 TABLET, FILM COATED ORAL DAILY
Refills: 0 | Status: DISCONTINUED | OUTPATIENT
Start: 2024-06-17 | End: 2024-06-20

## 2024-06-17 RX ORDER — IPRATROPIUM BROMIDE 42 UG/1
2 SPRAY NASAL
Refills: 0 | DISCHARGE

## 2024-06-17 RX ORDER — ATENOLOL 50 MG/1
12.5 TABLET ORAL AT BEDTIME
Refills: 0 | Status: DISCONTINUED | OUTPATIENT
Start: 2024-06-17 | End: 2024-06-20

## 2024-06-17 RX ORDER — L.ACIDOPH/B.ANIMALIS/B.LONGUM 15B CELL
1 CAPSULE ORAL
Qty: 0 | Refills: 0 | DISCHARGE

## 2024-06-17 RX ORDER — ENALAPRIL MALEATE 20 MG
1 TABLET ORAL
Refills: 0 | DISCHARGE

## 2024-06-17 RX ORDER — PANTOPRAZOLE SODIUM 40 MG/10ML
40 INJECTION, POWDER, FOR SOLUTION INTRAVENOUS
Refills: 0 | Status: DISCONTINUED | OUTPATIENT
Start: 2024-06-17 | End: 2024-06-20

## 2024-06-17 RX ORDER — ATENOLOL 50 MG/1
0.5 TABLET ORAL
Refills: 0 | DISCHARGE

## 2024-06-17 RX ORDER — APIXABAN 5 MG/1
1 TABLET, FILM COATED ORAL
Refills: 0 | DISCHARGE

## 2024-06-17 RX ORDER — PSYLLIUM SEED (WITH DEXTROSE)
1 POWDER (GRAM) ORAL
Qty: 0 | Refills: 0 | DISCHARGE

## 2024-06-17 RX ADMIN — Medication 2.5 MILLIGRAM(S): at 21:04

## 2024-06-17 RX ADMIN — Medication 1000 MILLILITER(S): at 15:12

## 2024-06-17 RX ADMIN — APIXABAN 2.5 MILLIGRAM(S): 5 TABLET, FILM COATED ORAL at 21:04

## 2024-06-18 ENCOUNTER — TRANSCRIPTION ENCOUNTER (OUTPATIENT)
Age: 89
End: 2024-06-18

## 2024-06-18 ENCOUNTER — RESULT REVIEW (OUTPATIENT)
Age: 89
End: 2024-06-18

## 2024-06-18 DIAGNOSIS — I10 ESSENTIAL (PRIMARY) HYPERTENSION: ICD-10-CM

## 2024-06-18 DIAGNOSIS — Z86.711 PERSONAL HISTORY OF PULMONARY EMBOLISM: ICD-10-CM

## 2024-06-18 DIAGNOSIS — R42 DIZZINESS AND GIDDINESS: ICD-10-CM

## 2024-06-18 DIAGNOSIS — I95.1 ORTHOSTATIC HYPOTENSION: ICD-10-CM

## 2024-06-18 LAB
ALBUMIN SERPL ELPH-MCNC: 4.1 G/DL — SIGNIFICANT CHANGE UP (ref 3.3–5)
ALP SERPL-CCNC: 77 U/L — SIGNIFICANT CHANGE UP (ref 40–120)
ALT FLD-CCNC: 22 U/L — SIGNIFICANT CHANGE UP (ref 10–45)
ANION GAP SERPL CALC-SCNC: 13 MMOL/L — SIGNIFICANT CHANGE UP (ref 5–17)
APPEARANCE UR: CLEAR — SIGNIFICANT CHANGE UP
AST SERPL-CCNC: 26 U/L — SIGNIFICANT CHANGE UP (ref 10–40)
BILIRUB SERPL-MCNC: 0.4 MG/DL — SIGNIFICANT CHANGE UP (ref 0.2–1.2)
BILIRUB UR-MCNC: NEGATIVE — SIGNIFICANT CHANGE UP
BUN SERPL-MCNC: 11 MG/DL — SIGNIFICANT CHANGE UP (ref 7–23)
CALCIUM SERPL-MCNC: 9.9 MG/DL — SIGNIFICANT CHANGE UP (ref 8.4–10.5)
CHLORIDE SERPL-SCNC: 102 MMOL/L — SIGNIFICANT CHANGE UP (ref 96–108)
CO2 SERPL-SCNC: 22 MMOL/L — SIGNIFICANT CHANGE UP (ref 22–31)
COLOR SPEC: YELLOW — SIGNIFICANT CHANGE UP
CREAT SERPL-MCNC: 0.62 MG/DL — SIGNIFICANT CHANGE UP (ref 0.5–1.3)
DIFF PNL FLD: NEGATIVE — SIGNIFICANT CHANGE UP
EGFR: 87 ML/MIN/1.73M2 — SIGNIFICANT CHANGE UP
GLUCOSE SERPL-MCNC: 100 MG/DL — HIGH (ref 70–99)
GLUCOSE UR QL: NEGATIVE MG/DL — SIGNIFICANT CHANGE UP
HCT VFR BLD CALC: 40.3 % — SIGNIFICANT CHANGE UP (ref 34.5–45)
HGB BLD-MCNC: 13.4 G/DL — SIGNIFICANT CHANGE UP (ref 11.5–15.5)
KETONES UR-MCNC: NEGATIVE MG/DL — SIGNIFICANT CHANGE UP
LEUKOCYTE ESTERASE UR-ACNC: ABNORMAL
MCHC RBC-ENTMCNC: 29.6 PG — SIGNIFICANT CHANGE UP (ref 27–34)
MCHC RBC-ENTMCNC: 33.3 GM/DL — SIGNIFICANT CHANGE UP (ref 32–36)
MCV RBC AUTO: 89.2 FL — SIGNIFICANT CHANGE UP (ref 80–100)
NITRITE UR-MCNC: NEGATIVE — SIGNIFICANT CHANGE UP
NRBC # BLD: 0 /100 WBCS — SIGNIFICANT CHANGE UP (ref 0–0)
PH UR: 7.5 — SIGNIFICANT CHANGE UP (ref 5–8)
PLATELET # BLD AUTO: 680 K/UL — HIGH (ref 150–400)
POTASSIUM SERPL-MCNC: 4.1 MMOL/L — SIGNIFICANT CHANGE UP (ref 3.5–5.3)
POTASSIUM SERPL-SCNC: 4.1 MMOL/L — SIGNIFICANT CHANGE UP (ref 3.5–5.3)
PROT SERPL-MCNC: 7 G/DL — SIGNIFICANT CHANGE UP (ref 6–8.3)
PROT UR-MCNC: NEGATIVE MG/DL — SIGNIFICANT CHANGE UP
RBC # BLD: 4.52 M/UL — SIGNIFICANT CHANGE UP (ref 3.8–5.2)
RBC # FLD: 14.8 % — HIGH (ref 10.3–14.5)
SODIUM SERPL-SCNC: 137 MMOL/L — SIGNIFICANT CHANGE UP (ref 135–145)
SP GR SPEC: 1.02 — SIGNIFICANT CHANGE UP (ref 1–1.03)
TROPONIN T, HIGH SENSITIVITY RESULT: 43 NG/L — SIGNIFICANT CHANGE UP (ref 0–51)
UROBILINOGEN FLD QL: 0.2 MG/DL — SIGNIFICANT CHANGE UP (ref 0.2–1)
WBC # BLD: 9.16 K/UL — SIGNIFICANT CHANGE UP (ref 3.8–10.5)
WBC # FLD AUTO: 9.16 K/UL — SIGNIFICANT CHANGE UP (ref 3.8–10.5)

## 2024-06-18 PROCEDURE — 93880 EXTRACRANIAL BILAT STUDY: CPT | Mod: 26

## 2024-06-18 PROCEDURE — 70551 MRI BRAIN STEM W/O DYE: CPT | Mod: 26

## 2024-06-18 PROCEDURE — 93306 TTE W/DOPPLER COMPLETE: CPT | Mod: 26

## 2024-06-18 RX ORDER — ENALAPRIL MALEATE 20 MG
2.5 TABLET ORAL
Refills: 0 | Status: DISCONTINUED | OUTPATIENT
Start: 2024-06-18 | End: 2024-06-20

## 2024-06-18 RX ORDER — ENALAPRIL MALEATE 20 MG
5 TABLET ORAL
Refills: 0 | Status: DISCONTINUED | OUTPATIENT
Start: 2024-06-18 | End: 2024-06-20

## 2024-06-18 RX ADMIN — Medication 2.5 MILLIGRAM(S): at 05:48

## 2024-06-18 RX ADMIN — ATENOLOL 12.5 MILLIGRAM(S): 50 TABLET ORAL at 21:39

## 2024-06-18 RX ADMIN — PANTOPRAZOLE SODIUM 40 MILLIGRAM(S): 40 INJECTION, POWDER, FOR SOLUTION INTRAVENOUS at 05:48

## 2024-06-18 RX ADMIN — APIXABAN 2.5 MILLIGRAM(S): 5 TABLET, FILM COATED ORAL at 18:27

## 2024-06-18 RX ADMIN — APIXABAN 2.5 MILLIGRAM(S): 5 TABLET, FILM COATED ORAL at 05:48

## 2024-06-18 RX ADMIN — ESCITALOPRAM OXALATE 10 MILLIGRAM(S): 20 TABLET, FILM COATED ORAL at 13:20

## 2024-06-19 LAB
ANION GAP SERPL CALC-SCNC: 10 MMOL/L — SIGNIFICANT CHANGE UP (ref 5–17)
BUN SERPL-MCNC: 20 MG/DL — SIGNIFICANT CHANGE UP (ref 7–23)
CALCIUM SERPL-MCNC: 9.6 MG/DL — SIGNIFICANT CHANGE UP (ref 8.4–10.5)
CHLORIDE SERPL-SCNC: 106 MMOL/L — SIGNIFICANT CHANGE UP (ref 96–108)
CO2 SERPL-SCNC: 22 MMOL/L — SIGNIFICANT CHANGE UP (ref 22–31)
CREAT SERPL-MCNC: 0.75 MG/DL — SIGNIFICANT CHANGE UP (ref 0.5–1.3)
EGFR: 77 ML/MIN/1.73M2 — SIGNIFICANT CHANGE UP
GLUCOSE SERPL-MCNC: 97 MG/DL — SIGNIFICANT CHANGE UP (ref 70–99)
HCT VFR BLD CALC: 39.2 % — SIGNIFICANT CHANGE UP (ref 34.5–45)
HGB BLD-MCNC: 13.1 G/DL — SIGNIFICANT CHANGE UP (ref 11.5–15.5)
MCHC RBC-ENTMCNC: 29.4 PG — SIGNIFICANT CHANGE UP (ref 27–34)
MCHC RBC-ENTMCNC: 33.4 GM/DL — SIGNIFICANT CHANGE UP (ref 32–36)
MCV RBC AUTO: 87.9 FL — SIGNIFICANT CHANGE UP (ref 80–100)
MRSA PCR RESULT.: SIGNIFICANT CHANGE UP
NRBC # BLD: 0 /100 WBCS — SIGNIFICANT CHANGE UP (ref 0–0)
PLATELET # BLD AUTO: 589 K/UL — HIGH (ref 150–400)
POTASSIUM SERPL-MCNC: 3.9 MMOL/L — SIGNIFICANT CHANGE UP (ref 3.5–5.3)
POTASSIUM SERPL-SCNC: 3.9 MMOL/L — SIGNIFICANT CHANGE UP (ref 3.5–5.3)
RBC # BLD: 4.46 M/UL — SIGNIFICANT CHANGE UP (ref 3.8–5.2)
RBC # FLD: 14.9 % — HIGH (ref 10.3–14.5)
S AUREUS DNA NOSE QL NAA+PROBE: SIGNIFICANT CHANGE UP
SODIUM SERPL-SCNC: 138 MMOL/L — SIGNIFICANT CHANGE UP (ref 135–145)
T4 FREE SERPL-MCNC: 1.3 NG/DL — SIGNIFICANT CHANGE UP (ref 0.9–1.8)
TSH SERPL-MCNC: 2 UIU/ML — SIGNIFICANT CHANGE UP (ref 0.27–4.2)
WBC # BLD: 6.47 K/UL — SIGNIFICANT CHANGE UP (ref 3.8–10.5)
WBC # FLD AUTO: 6.47 K/UL — SIGNIFICANT CHANGE UP (ref 3.8–10.5)

## 2024-06-19 RX ORDER — POLYETHYLENE GLYCOL 3350 1 G/G
17 POWDER ORAL DAILY
Refills: 0 | Status: DISCONTINUED | OUTPATIENT
Start: 2024-06-19 | End: 2024-06-20

## 2024-06-19 RX ORDER — ASPIRIN 325 MG/1
81 TABLET, FILM COATED ORAL DAILY
Refills: 0 | Status: DISCONTINUED | OUTPATIENT
Start: 2024-06-19 | End: 2024-06-20

## 2024-06-19 RX ORDER — ATORVASTATIN CALCIUM 20 MG/1
40 TABLET, FILM COATED ORAL AT BEDTIME
Refills: 0 | Status: DISCONTINUED | OUTPATIENT
Start: 2024-06-19 | End: 2024-06-20

## 2024-06-19 RX ORDER — SENNOSIDES 8.6 MG
2 TABLET ORAL AT BEDTIME
Refills: 0 | Status: DISCONTINUED | OUTPATIENT
Start: 2024-06-19 | End: 2024-06-20

## 2024-06-19 RX ADMIN — POLYETHYLENE GLYCOL 3350 17 GRAM(S): 1 POWDER ORAL at 16:39

## 2024-06-19 RX ADMIN — ASPIRIN 81 MILLIGRAM(S): 325 TABLET, FILM COATED ORAL at 11:54

## 2024-06-19 RX ADMIN — APIXABAN 2.5 MILLIGRAM(S): 5 TABLET, FILM COATED ORAL at 05:33

## 2024-06-19 RX ADMIN — Medication 5 MILLIGRAM(S): at 17:14

## 2024-06-19 RX ADMIN — ATENOLOL 12.5 MILLIGRAM(S): 50 TABLET ORAL at 21:21

## 2024-06-19 RX ADMIN — ATORVASTATIN CALCIUM 40 MILLIGRAM(S): 20 TABLET, FILM COATED ORAL at 21:21

## 2024-06-19 RX ADMIN — Medication 1 APPLICATION(S): at 11:54

## 2024-06-19 RX ADMIN — APIXABAN 2.5 MILLIGRAM(S): 5 TABLET, FILM COATED ORAL at 17:14

## 2024-06-19 RX ADMIN — PANTOPRAZOLE SODIUM 40 MILLIGRAM(S): 40 INJECTION, POWDER, FOR SOLUTION INTRAVENOUS at 05:33

## 2024-06-19 RX ADMIN — ESCITALOPRAM OXALATE 10 MILLIGRAM(S): 20 TABLET, FILM COATED ORAL at 11:54

## 2024-06-19 RX ADMIN — Medication 2 TABLET(S): at 21:21

## 2024-06-19 RX ADMIN — Medication 2.5 MILLIGRAM(S): at 05:33

## 2024-06-20 ENCOUNTER — TRANSCRIPTION ENCOUNTER (OUTPATIENT)
Age: 89
End: 2024-06-20

## 2024-06-20 VITALS
DIASTOLIC BLOOD PRESSURE: 90 MMHG | SYSTOLIC BLOOD PRESSURE: 170 MMHG | RESPIRATION RATE: 18 BRPM | OXYGEN SATURATION: 98 % | HEART RATE: 67 BPM

## 2024-06-20 LAB
A1C WITH ESTIMATED AVERAGE GLUCOSE RESULT: 5.5 % — SIGNIFICANT CHANGE UP (ref 4–5.6)
CHOLEST SERPL-MCNC: 197 MG/DL — SIGNIFICANT CHANGE UP
ESTIMATED AVERAGE GLUCOSE: 111 MG/DL — SIGNIFICANT CHANGE UP (ref 68–114)
HDLC SERPL-MCNC: 48 MG/DL — LOW
LIPID PNL WITH DIRECT LDL SERPL: 134 MG/DL — HIGH
NON HDL CHOLESTEROL: 149 MG/DL — HIGH
TRIGL SERPL-MCNC: 84 MG/DL — SIGNIFICANT CHANGE UP

## 2024-06-20 PROCEDURE — 80048 BASIC METABOLIC PNL TOTAL CA: CPT

## 2024-06-20 PROCEDURE — 70450 CT HEAD/BRAIN W/O DYE: CPT | Mod: MC

## 2024-06-20 PROCEDURE — 83036 HEMOGLOBIN GLYCOSYLATED A1C: CPT

## 2024-06-20 PROCEDURE — 82962 GLUCOSE BLOOD TEST: CPT

## 2024-06-20 PROCEDURE — 87641 MR-STAPH DNA AMP PROBE: CPT

## 2024-06-20 PROCEDURE — 93880 EXTRACRANIAL BILAT STUDY: CPT

## 2024-06-20 PROCEDURE — 70496 CT ANGIOGRAPHY HEAD: CPT | Mod: MC

## 2024-06-20 PROCEDURE — 71046 X-RAY EXAM CHEST 2 VIEWS: CPT

## 2024-06-20 PROCEDURE — 85027 COMPLETE CBC AUTOMATED: CPT

## 2024-06-20 PROCEDURE — 99285 EMERGENCY DEPT VISIT HI MDM: CPT | Mod: 25

## 2024-06-20 PROCEDURE — 80061 LIPID PANEL: CPT

## 2024-06-20 PROCEDURE — 70498 CT ANGIOGRAPHY NECK: CPT | Mod: MC

## 2024-06-20 PROCEDURE — 36415 COLL VENOUS BLD VENIPUNCTURE: CPT

## 2024-06-20 PROCEDURE — 84439 ASSAY OF FREE THYROXINE: CPT

## 2024-06-20 PROCEDURE — 85025 COMPLETE CBC W/AUTO DIFF WBC: CPT

## 2024-06-20 PROCEDURE — 84484 ASSAY OF TROPONIN QUANT: CPT

## 2024-06-20 PROCEDURE — 93306 TTE W/DOPPLER COMPLETE: CPT

## 2024-06-20 PROCEDURE — 97161 PT EVAL LOW COMPLEX 20 MIN: CPT

## 2024-06-20 PROCEDURE — 70551 MRI BRAIN STEM W/O DYE: CPT | Mod: MC

## 2024-06-20 PROCEDURE — 80053 COMPREHEN METABOLIC PANEL: CPT

## 2024-06-20 PROCEDURE — 81001 URINALYSIS AUTO W/SCOPE: CPT

## 2024-06-20 PROCEDURE — 87640 STAPH A DNA AMP PROBE: CPT

## 2024-06-20 PROCEDURE — 83880 ASSAY OF NATRIURETIC PEPTIDE: CPT

## 2024-06-20 PROCEDURE — 84443 ASSAY THYROID STIM HORMONE: CPT

## 2024-06-20 RX ORDER — ATORVASTATIN CALCIUM 20 MG/1
1 TABLET, FILM COATED ORAL
Qty: 30 | Refills: 3
Start: 2024-06-20 | End: 2024-10-17

## 2024-06-20 RX ORDER — ATORVASTATIN CALCIUM 20 MG/1
1 TABLET, FILM COATED ORAL
Qty: 0 | Refills: 0 | DISCHARGE
Start: 2024-06-20

## 2024-06-20 RX ORDER — POLYETHYLENE GLYCOL 3350 1 G/G
17 POWDER ORAL
Qty: 0 | Refills: 0 | DISCHARGE
Start: 2024-06-20

## 2024-06-20 RX ORDER — ASPIRIN 325 MG/1
1 TABLET, FILM COATED ORAL
Qty: 0 | Refills: 0 | DISCHARGE
Start: 2024-06-20

## 2024-06-20 RX ORDER — ASPIRIN 325 MG/1
81 TABLET, FILM COATED ORAL DAILY
Refills: 0 | Status: DISCONTINUED | OUTPATIENT
Start: 2024-06-20 | End: 2024-06-20

## 2024-06-20 RX ORDER — ESCITALOPRAM OXALATE 20 MG/1
1.5 TABLET, FILM COATED ORAL
Qty: 0 | Refills: 0 | DISCHARGE

## 2024-06-20 RX ADMIN — ASPIRIN 81 MILLIGRAM(S): 325 TABLET, FILM COATED ORAL at 11:22

## 2024-06-20 RX ADMIN — ATENOLOL 12.5 MILLIGRAM(S): 50 TABLET ORAL at 17:18

## 2024-06-20 RX ADMIN — APIXABAN 2.5 MILLIGRAM(S): 5 TABLET, FILM COATED ORAL at 05:32

## 2024-06-20 RX ADMIN — Medication 5 MILLIGRAM(S): at 15:33

## 2024-06-20 RX ADMIN — APIXABAN 2.5 MILLIGRAM(S): 5 TABLET, FILM COATED ORAL at 17:18

## 2024-06-20 RX ADMIN — ESCITALOPRAM OXALATE 10 MILLIGRAM(S): 20 TABLET, FILM COATED ORAL at 11:22

## 2024-06-20 RX ADMIN — Medication 2.5 MILLIGRAM(S): at 05:31

## 2024-06-20 RX ADMIN — Medication 1 APPLICATION(S): at 11:22

## 2024-06-20 RX ADMIN — PANTOPRAZOLE SODIUM 40 MILLIGRAM(S): 40 INJECTION, POWDER, FOR SOLUTION INTRAVENOUS at 05:32

## 2024-08-23 ENCOUNTER — INPATIENT (INPATIENT)
Facility: HOSPITAL | Age: 89
LOS: 1 days | Discharge: HOME | DRG: 392 | End: 2024-08-25
Attending: SURGERY | Admitting: SURGERY
Payer: MEDICARE

## 2024-08-23 VITALS
SYSTOLIC BLOOD PRESSURE: 175 MMHG | OXYGEN SATURATION: 95 % | WEIGHT: 149.91 LBS | RESPIRATION RATE: 17 BRPM | TEMPERATURE: 98 F | HEART RATE: 65 BPM | HEIGHT: 65 IN | DIASTOLIC BLOOD PRESSURE: 74 MMHG

## 2024-08-23 DIAGNOSIS — Z98.890 OTHER SPECIFIED POSTPROCEDURAL STATES: Chronic | ICD-10-CM

## 2024-08-23 DIAGNOSIS — Z90.49 ACQUIRED ABSENCE OF OTHER SPECIFIED PARTS OF DIGESTIVE TRACT: Chronic | ICD-10-CM

## 2024-08-23 DIAGNOSIS — K57.20 DIVERTICULITIS OF LARGE INTESTINE WITH PERFORATION AND ABSCESS WITHOUT BLEEDING: ICD-10-CM

## 2024-08-23 DIAGNOSIS — I26.99 OTHER PULMONARY EMBOLISM WITHOUT ACUTE COR PULMONALE: ICD-10-CM

## 2024-08-23 LAB
ALBUMIN SERPL ELPH-MCNC: 4.1 G/DL — SIGNIFICANT CHANGE UP (ref 3.3–5)
ALP SERPL-CCNC: 111 U/L — SIGNIFICANT CHANGE UP (ref 40–120)
ALT FLD-CCNC: 30 U/L — SIGNIFICANT CHANGE UP (ref 10–45)
ANION GAP SERPL CALC-SCNC: 7 MMOL/L — SIGNIFICANT CHANGE UP (ref 5–17)
APPEARANCE UR: CLEAR — SIGNIFICANT CHANGE UP
AST SERPL-CCNC: 26 U/L — SIGNIFICANT CHANGE UP (ref 10–40)
BASOPHILS # BLD AUTO: 0.04 K/UL — SIGNIFICANT CHANGE UP (ref 0–0.2)
BASOPHILS NFR BLD AUTO: 0.4 % — SIGNIFICANT CHANGE UP (ref 0–2)
BILIRUB SERPL-MCNC: 0.5 MG/DL — SIGNIFICANT CHANGE UP (ref 0.2–1.2)
BILIRUB UR-MCNC: NEGATIVE — SIGNIFICANT CHANGE UP
BUN SERPL-MCNC: 18 MG/DL — SIGNIFICANT CHANGE UP (ref 7–23)
CALCIUM SERPL-MCNC: 10 MG/DL — SIGNIFICANT CHANGE UP (ref 8.4–10.5)
CHLORIDE SERPL-SCNC: 102 MMOL/L — SIGNIFICANT CHANGE UP (ref 96–108)
CO2 SERPL-SCNC: 28 MMOL/L — SIGNIFICANT CHANGE UP (ref 22–31)
COLOR SPEC: YELLOW — SIGNIFICANT CHANGE UP
CREAT SERPL-MCNC: 0.64 MG/DL — SIGNIFICANT CHANGE UP (ref 0.5–1.3)
DIFF PNL FLD: NEGATIVE — SIGNIFICANT CHANGE UP
EGFR: 86 ML/MIN/1.73M2 — SIGNIFICANT CHANGE UP
EOSINOPHIL # BLD AUTO: 0.13 K/UL — SIGNIFICANT CHANGE UP (ref 0–0.5)
EOSINOPHIL NFR BLD AUTO: 1.3 % — SIGNIFICANT CHANGE UP (ref 0–6)
GAS PNL BLDV: SIGNIFICANT CHANGE UP
GLUCOSE SERPL-MCNC: 91 MG/DL — SIGNIFICANT CHANGE UP (ref 70–99)
GLUCOSE UR QL: NEGATIVE MG/DL — SIGNIFICANT CHANGE UP
HCT VFR BLD CALC: 35.7 % — SIGNIFICANT CHANGE UP (ref 34.5–45)
HGB BLD-MCNC: 11.7 G/DL — SIGNIFICANT CHANGE UP (ref 11.5–15.5)
IMM GRANULOCYTES NFR BLD AUTO: 0.3 % — SIGNIFICANT CHANGE UP (ref 0–0.9)
KETONES UR-MCNC: NEGATIVE MG/DL — SIGNIFICANT CHANGE UP
LEUKOCYTE ESTERASE UR-ACNC: NEGATIVE — SIGNIFICANT CHANGE UP
LIDOCAIN IGE QN: 39 U/L — SIGNIFICANT CHANGE UP (ref 7–60)
LYMPHOCYTES # BLD AUTO: 1.45 K/UL — SIGNIFICANT CHANGE UP (ref 1–3.3)
LYMPHOCYTES # BLD AUTO: 14.4 % — SIGNIFICANT CHANGE UP (ref 13–44)
MCHC RBC-ENTMCNC: 30 PG — SIGNIFICANT CHANGE UP (ref 27–34)
MCHC RBC-ENTMCNC: 32.8 GM/DL — SIGNIFICANT CHANGE UP (ref 32–36)
MCV RBC AUTO: 91.5 FL — SIGNIFICANT CHANGE UP (ref 80–100)
MONOCYTES # BLD AUTO: 0.73 K/UL — SIGNIFICANT CHANGE UP (ref 0–0.9)
MONOCYTES NFR BLD AUTO: 7.3 % — SIGNIFICANT CHANGE UP (ref 2–14)
NEUTROPHILS # BLD AUTO: 7.66 K/UL — HIGH (ref 1.8–7.4)
NEUTROPHILS NFR BLD AUTO: 76.3 % — SIGNIFICANT CHANGE UP (ref 43–77)
NITRITE UR-MCNC: NEGATIVE — SIGNIFICANT CHANGE UP
NRBC # BLD: 0 /100 WBCS — SIGNIFICANT CHANGE UP (ref 0–0)
PH UR: 7 — SIGNIFICANT CHANGE UP (ref 5–8)
PLATELET # BLD AUTO: 622 K/UL — HIGH (ref 150–400)
POTASSIUM SERPL-MCNC: 4.3 MMOL/L — SIGNIFICANT CHANGE UP (ref 3.5–5.3)
POTASSIUM SERPL-SCNC: 4.3 MMOL/L — SIGNIFICANT CHANGE UP (ref 3.5–5.3)
PROT SERPL-MCNC: 7.1 G/DL — SIGNIFICANT CHANGE UP (ref 6–8.3)
PROT UR-MCNC: NEGATIVE MG/DL — SIGNIFICANT CHANGE UP
RBC # BLD: 3.9 M/UL — SIGNIFICANT CHANGE UP (ref 3.8–5.2)
RBC # FLD: 14.9 % — HIGH (ref 10.3–14.5)
SODIUM SERPL-SCNC: 137 MMOL/L — SIGNIFICANT CHANGE UP (ref 135–145)
SP GR SPEC: 1.01 — SIGNIFICANT CHANGE UP (ref 1–1.03)
UROBILINOGEN FLD QL: 0.2 MG/DL — SIGNIFICANT CHANGE UP (ref 0.2–1)
WBC # BLD: 10.04 K/UL — SIGNIFICANT CHANGE UP (ref 3.8–10.5)
WBC # FLD AUTO: 10.04 K/UL — SIGNIFICANT CHANGE UP (ref 3.8–10.5)

## 2024-08-23 PROCEDURE — 74177 CT ABD & PELVIS W/CONTRAST: CPT | Mod: 26,MC

## 2024-08-23 PROCEDURE — 99285 EMERGENCY DEPT VISIT HI MDM: CPT

## 2024-08-23 PROCEDURE — 99223 1ST HOSP IP/OBS HIGH 75: CPT

## 2024-08-23 RX ORDER — ATENOLOL 100 MG
25 TABLET ORAL DAILY
Refills: 0 | Status: DISCONTINUED | OUTPATIENT
Start: 2024-08-23 | End: 2024-08-25

## 2024-08-23 RX ORDER — ACETAMINOPHEN 325 MG/1
1000 TABLET ORAL EVERY 6 HOURS
Refills: 0 | Status: DISCONTINUED | OUTPATIENT
Start: 2024-08-23 | End: 2024-08-25

## 2024-08-23 RX ORDER — ESCITALOPRAM OXALATE 10 MG/1
15 TABLET ORAL DAILY
Refills: 0 | Status: DISCONTINUED | OUTPATIENT
Start: 2024-08-23 | End: 2024-08-25

## 2024-08-23 RX ORDER — APIXABAN 5 MG/1
2.5 TABLET, FILM COATED ORAL
Refills: 0 | Status: DISCONTINUED | OUTPATIENT
Start: 2024-08-23 | End: 2024-08-25

## 2024-08-23 RX ORDER — METRONIDAZOLE 250 MG
500 TABLET ORAL ONCE
Refills: 0 | Status: COMPLETED | OUTPATIENT
Start: 2024-08-23 | End: 2024-08-23

## 2024-08-23 RX ORDER — METRONIDAZOLE 250 MG
500 TABLET ORAL EVERY 12 HOURS
Refills: 0 | Status: DISCONTINUED | OUTPATIENT
Start: 2024-08-23 | End: 2024-08-25

## 2024-08-23 RX ORDER — ASPIRIN 81 MG
81 TABLET, DELAYED RELEASE (ENTERIC COATED) ORAL DAILY
Refills: 0 | Status: DISCONTINUED | OUTPATIENT
Start: 2024-08-23 | End: 2024-08-25

## 2024-08-23 RX ADMIN — Medication 200 MILLIGRAM(S): at 14:47

## 2024-08-23 RX ADMIN — APIXABAN 2.5 MILLIGRAM(S): 5 TABLET, FILM COATED ORAL at 18:09

## 2024-08-23 RX ADMIN — ESCITALOPRAM OXALATE 15 MILLIGRAM(S): 10 TABLET ORAL at 18:09

## 2024-08-23 RX ADMIN — Medication 25 MILLIGRAM(S): at 18:09

## 2024-08-23 RX ADMIN — Medication 100 MILLIGRAM(S): at 16:59

## 2024-08-23 RX ADMIN — Medication 81 MILLIGRAM(S): at 18:29

## 2024-08-23 NOTE — ED PROVIDER NOTE - PROGRESS NOTE DETAILS
Spoke with pt, states that pain has improved significantly in comparison to initial ED presentation  - Mary [facility] MS4 CT showing diverticulitis with intramural collection concerning for possible abscess.  Surgery consulted and recommending admission for further management. -Compa Chaves PA-C

## 2024-08-23 NOTE — ED ADULT NURSE NOTE - OBJECTIVE STATEMENT
Pt is a 88y F jennifer Pt is a 88y F PMH HTN, cholecystectomy c/o sudden onset RLQ abd pain since 1:30AM. Pt states she attempted to have BM, was unsuccessful when abd pain began. Pt denies cp, sob, NVD, urinary symptoms. Pt placed in gown, bed in lowest Pt is a 88y F PMH HTN, cholecystectomy c/o sudden onset RLQ abd pain since 1:30AM. Pt states she attempted to have BM, was unsuccessful when abd pain began. Pt last normal BM approx 1.5 wks ago. Pt denies cp, sob, NVD, urinary symptoms. Pt placed in gown, bed in lowest position, aware of plan of care. Comfort and safety measures maintained.

## 2024-08-23 NOTE — ED PROVIDER NOTE - SKIN NEGATIVE STATEMENT, MLM
Spoke to patient and wife. Patient took Percocet which made the pain tolerable. Patient not eating well for two weeks, megace is not helping. Wife states that insurance did not approve Periactin or Marinol. They are wondering if there is anything else he can take. Patient states that he wakes up because he gets short of breath, he had to sleep sitting up last night. He is also stating that he has back pain in the middle of his back only when laying down.    no abrasions, no jaundice, no lesions, no pruritis, and no rashes.

## 2024-08-23 NOTE — ED PROVIDER NOTE - CLINICAL SUMMARY MEDICAL DECISION MAKING FREE TEXT BOX
Pt is an 88y female with a pmhx of HTN, cholecystectomy, and adhesiolysis (2005) is p/w RLQ abdominal pain x 1:30am this morning.   Vitals: BP: 181/82 HR: 61 Temp: 97.5 O2 sat: 98 RR: 16  PE: +RLQ tenderness, no rebound, no guarding  Concern for SBO, appendicitis,  will obtain labs, imaging, and pain management as needed due to pmhx of abdominal surgery and adhesiolysis   Will reassess Pt is an 88y female with a pmhx of HTN, cholecystectomy, and adhesiolysis (2005) is p/w RLQ abdominal pain x 1:30am this morning.   Vitals: BP: 181/82 HR: 61 Temp: 97.5 O2 sat: 98 RR: 16  PE: +RLQ tenderness, no rebound, no guarding  Concern for SBO vs appendicitis,  will obtain labs, imaging, and pain management as needed due to pmhx of abdominal surgery and adhesiolysis   Will reassess

## 2024-08-23 NOTE — PATIENT PROFILE ADULT - FALL HARM RISK - HARM RISK INTERVENTIONS

## 2024-08-23 NOTE — H&P ADULT - ASSESSMENT
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 Associated w 1 episode of diarrhea. In ED, AVSS, WBC wnl. CT scan showing rectosigmoid diverticulitis with 5 mm intramural abscess. Patient has complicated Hinchey 1a diverticulitis.    Plan:  - admit to red team surgery, Dr. Looney  - NPO, IVF  - IV abx: cipro, flagyl due to penicillin allergy  - c/w home meds, holding ACE  - exam before pain meds  - oob as tolerating    Discussed with colorectal surgeon Dr. Looney  Red Surgery  [phone]

## 2024-08-23 NOTE — H&P ADULT - HISTORY OF PRESENT ILLNESS
88y F with a pmhx of HTN, cholecystectomy, and lap CHERRIE (2005) is p/w RLQ abdominal pain x 1:30am this morning. Associated w 1 episode of diarrhea. In ED, AVSS, WBC wnl. CT scan showing rectosigmoid diverticulitis with 5 mm intramural abscess.     Patient states she has never had this pain before. States her last colonoscopy was in 2016 and was within normal limits and she was told she didn't need another colonoscopy. Denies fever, chills, SOB, CP. States she has constipation at baseline and takes senna every night.  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 Associated w 1 episode of diarrhea. In ED, AVSS, WBC wnl. CT scan showing rectosigmoid diverticulitis with 5 mm intramural abscess.     Patient states she has never had this pain before. States her last colonoscopy was in 2016 and was within normal limits and she was told she didn't need another colonoscopy. Denies fever, chills, SOB, CP. States she has constipation at baseline and takes senna every night.

## 2024-08-23 NOTE — H&P ADULT - NSHPLABSRESULTS_GEN_ALL_CORE
LABS:  cret                        11.7   10.04 )-----------( 622      ( 23 Aug 2024 11:18 )             35.7     08-23    137  |  102  |  18  ----------------------------<  91  4.3   |  28  |  0.64    Ca    10.0      23 Aug 2024 11:18    TPro  7.1  /  Alb  4.1  /  TBili  0.5  /  DBili  x   /  AST  26  /  ALT  30  /  AlkPhos  111  08-23        Albumin: 4.1 g/dL (08-23-24 @ 11:18)      < from: CT Abdomen and Pelvis w/ IV Cont (08.23.24 @ 11:52) >    FINDINGS:  LOWER CHEST: Bibasilar subsegmental atelectasis. Cardiomegaly.    LIVER: Mild heterogeneous enhancement in the right hepatic lobe.  BILE DUCTS: Extrahepatic and moderate intrahepatic biliary duct dilation,   similar to 11/16/2022. The common bile duct measures up to 12 mm in   caliber.  GALLBLADDER: Cholecystectomy.  SPLEEN: Top normal in size, measures 12.7 cm.  PANCREAS: A few scattered calcifications. Prominent main pancreatic duct   measuring up to 5 mm in diameter, unchanged from 11/16/2022.  ADRENALS: Within normal limits.  KIDNEYS/URETERS: Within normal limits.    BLADDER: Within normal limits.  REPRODUCTIVE ORGANS: Uterus not visualized.    BOWEL: Moderate to large colonic stool burden. No evidence for bowel   obstruction. Colonic diverticulosis. Wall thickening in the rectosigmoid   colon with adjacent fat infiltration, which may be secondary to acute   diverticulitis or colitis. There is a 5 mm hypoattenuating focus centered   in the wall of the sigmoid colon, which may represent a small developing   mural abscess (series 301 image 75, series 602 image 54).    PERITONEUM/RETROPERITONEUM: Trace free peritoneal fluid in the pelvis. No   free air.  VESSELS: Atherosclerotic changes.  LYMPH NODES: No lymphadenopathy.  ABDOMINAL WALL: Small fat-containing bilateral inguinal hernias.  BONES: Degenerative changes of the spine. Grade 1 anterolisthesis of L4   over L5, unchanged. Sacral Tarlov cysts. Unchanged mild compression   fracture deformity at T12.    IMPRESSION:  *  Wall thickening of the rectosigmoid colon with adjacent fat   infiltration, concerning for acute diverticulitis or colitis. There is a   5 mm hypoattenuating focus centered in the wall the sigmoid colon, which   may represent a small developing mural abscess. Recommend continued   follow-up to ensure resolution. Consider follow-up with colonoscopy to   exclude underlying lesion.  *  Trace free fluid in the pelvis. No pneumoperitoneum.  *  Moderate to large colonic stool burden. No evidence for bowel   obstruction.  *  Moderate biliary duct dilation and mild prominence of the main   pancreatic duct, similar to the exam of 11/16/2022.  *  Status post cholecystectomy.  *  Mild heterogeneous enhancement in the right hepatic lobe, possibly due   to congestive hepatopathy.  *  Cardiomegaly.      < end of copied text >

## 2024-08-23 NOTE — ED PROVIDER NOTE - OBJECTIVE STATEMENT
Pt is an 88y female with a pmhx of HTN, Pt is an 88y female with a pmhx of HTN, cholecystectomy, and adhesiolysis (2005) is p/w RLQ abdominal pain x 1:30am this morning. Pt states that she got out of bed to pass a BM, was unable to pass BM, but noted mucus in the toilet. Pt states that sxs began shortly after, denies taking any analgesics for sxs management. Pt states that she has been unable to pass flatus and states that he last normal BM was 1.5 weeks ago, has been taking Senna for sxs with minimal to no relief. Pt denies urinary complaints, N/V/D Pt is an 88y female with a pmhx of HTN, cholecystectomy, and adhesiolysis () is p/w RLQ abdominal pain x 1:30am this morning. Pt states that she got out of bed to pass a BM, was unable to pass BM, but noted mucus in the toilet. Pt states that sxs began shortly after, denies taking any analgesics for sxs management. Pt states that she has been unable to pass flatus and states that he last normal BM was 1.5 weeks ago, has been taking Senna for sxs with minimal to no relief. Pt denies urinary complaints, N/V/D    Attendinyo female presents with right lower quadrant pain since this morning.  has decreased appetite.  no fever.  no vomiting.

## 2024-08-23 NOTE — H&P ADULT - NSHPPHYSICALEXAM_GEN_ALL_CORE
T(C): 36.6 (08-23-24 @ 14:50), Max: 36.6 (08-23-24 @ 14:50)  HR: 58 (08-23-24 @ 14:50) (58 - 65)  BP: 189/86 (08-23-24 @ 14:50) (175/74 - 189/86)  RR: 15 (08-23-24 @ 14:50) (15 - 17)  SpO2: 95% (08-23-24 @ 14:50) (95% - 98%)    CONSTITUTIONAL: Well groomed, no apparent distress  ENMT: Oral mucosa with moist membranes  RESP: No respiratory distress  CV: RRR  GI: Soft, mildly ttp in RLQ, ND, no rebound, no guarding; no palpable masses; no hepatosplenomegaly; no hernia palpated  MSK: Normal gait; No digital clubbing or cyanosis  SKIN: No rashes or ulcers noted  NEURO: CN II-XII intact  PSYCH: Appropriate insight/judgment; A+O x 3

## 2024-08-24 DIAGNOSIS — I10 ESSENTIAL (PRIMARY) HYPERTENSION: ICD-10-CM

## 2024-08-24 DIAGNOSIS — Z86.73 PERSONAL HISTORY OF TRANSIENT ISCHEMIC ATTACK (TIA), AND CEREBRAL INFARCTION WITHOUT RESIDUAL DEFICITS: ICD-10-CM

## 2024-08-24 DIAGNOSIS — K57.92 DIVERTICULITIS OF INTESTINE, PART UNSPECIFIED, WITHOUT PERFORATION OR ABSCESS WITHOUT BLEEDING: ICD-10-CM

## 2024-08-24 DIAGNOSIS — Z86.711 PERSONAL HISTORY OF PULMONARY EMBOLISM: ICD-10-CM

## 2024-08-24 LAB
ANION GAP SERPL CALC-SCNC: 11 MMOL/L — SIGNIFICANT CHANGE UP (ref 5–17)
BUN SERPL-MCNC: 12 MG/DL — SIGNIFICANT CHANGE UP (ref 7–23)
CALCIUM SERPL-MCNC: 9.6 MG/DL — SIGNIFICANT CHANGE UP (ref 8.4–10.5)
CHLORIDE SERPL-SCNC: 101 MMOL/L — SIGNIFICANT CHANGE UP (ref 96–108)
CO2 SERPL-SCNC: 25 MMOL/L — SIGNIFICANT CHANGE UP (ref 22–31)
CREAT SERPL-MCNC: 0.62 MG/DL — SIGNIFICANT CHANGE UP (ref 0.5–1.3)
CULTURE RESULTS: SIGNIFICANT CHANGE UP
EGFR: 87 ML/MIN/1.73M2 — SIGNIFICANT CHANGE UP
GLUCOSE SERPL-MCNC: 100 MG/DL — HIGH (ref 70–99)
HCT VFR BLD CALC: 37.8 % — SIGNIFICANT CHANGE UP (ref 34.5–45)
HGB BLD-MCNC: 12.4 G/DL — SIGNIFICANT CHANGE UP (ref 11.5–15.5)
MAGNESIUM SERPL-MCNC: 1.9 MG/DL — SIGNIFICANT CHANGE UP (ref 1.6–2.6)
MCHC RBC-ENTMCNC: 29.9 PG — SIGNIFICANT CHANGE UP (ref 27–34)
MCHC RBC-ENTMCNC: 32.8 GM/DL — SIGNIFICANT CHANGE UP (ref 32–36)
MCV RBC AUTO: 91.1 FL — SIGNIFICANT CHANGE UP (ref 80–100)
NRBC # BLD: 0 /100 WBCS — SIGNIFICANT CHANGE UP (ref 0–0)
PHOSPHATE SERPL-MCNC: 2.9 MG/DL — SIGNIFICANT CHANGE UP (ref 2.5–4.5)
PLATELET # BLD AUTO: 630 K/UL — HIGH (ref 150–400)
POTASSIUM SERPL-MCNC: 4 MMOL/L — SIGNIFICANT CHANGE UP (ref 3.5–5.3)
POTASSIUM SERPL-SCNC: 4 MMOL/L — SIGNIFICANT CHANGE UP (ref 3.5–5.3)
RBC # BLD: 4.15 M/UL — SIGNIFICANT CHANGE UP (ref 3.8–5.2)
RBC # FLD: 14.7 % — HIGH (ref 10.3–14.5)
SODIUM SERPL-SCNC: 137 MMOL/L — SIGNIFICANT CHANGE UP (ref 135–145)
SPECIMEN SOURCE: SIGNIFICANT CHANGE UP
WBC # BLD: 7.38 K/UL — SIGNIFICANT CHANGE UP (ref 3.8–10.5)
WBC # FLD AUTO: 7.38 K/UL — SIGNIFICANT CHANGE UP (ref 3.8–10.5)

## 2024-08-24 PROCEDURE — 99233 SBSQ HOSP IP/OBS HIGH 50: CPT

## 2024-08-24 RX ORDER — ENALAPRIL MALEATE 5 MG/1
2.5 TABLET ORAL EVERY 24 HOURS
Refills: 0 | Status: DISCONTINUED | OUTPATIENT
Start: 2024-08-25 | End: 2024-08-25

## 2024-08-24 RX ORDER — ENALAPRIL MALEATE 5 MG/1
5 TABLET ORAL EVERY 24 HOURS
Refills: 0 | Status: DISCONTINUED | OUTPATIENT
Start: 2024-08-24 | End: 2024-08-25

## 2024-08-24 RX ADMIN — Medication 100 MILLILITER(S): at 13:25

## 2024-08-24 RX ADMIN — APIXABAN 2.5 MILLIGRAM(S): 5 TABLET, FILM COATED ORAL at 18:14

## 2024-08-24 RX ADMIN — ESCITALOPRAM OXALATE 15 MILLIGRAM(S): 10 TABLET ORAL at 13:26

## 2024-08-24 RX ADMIN — APIXABAN 2.5 MILLIGRAM(S): 5 TABLET, FILM COATED ORAL at 05:15

## 2024-08-24 RX ADMIN — ENALAPRIL MALEATE 5 MILLIGRAM(S): 5 TABLET ORAL at 21:52

## 2024-08-24 RX ADMIN — Medication 100 MILLIGRAM(S): at 05:13

## 2024-08-24 RX ADMIN — Medication 200 MILLIGRAM(S): at 05:12

## 2024-08-24 RX ADMIN — Medication 25 MILLIGRAM(S): at 21:22

## 2024-08-24 RX ADMIN — Medication 200 MILLIGRAM(S): at 18:14

## 2024-08-24 RX ADMIN — Medication 81 MILLIGRAM(S): at 13:27

## 2024-08-24 RX ADMIN — Medication 100 MILLIGRAM(S): at 18:14

## 2024-08-24 NOTE — PROGRESS NOTE ADULT - TIME-BASED
Airway  Urgency: elective    Date/Time: 8/7/2024 5:24 PM  Airway not difficult    General Information and Staff    Patient location during procedure: OR  CRNA/CAA: Rene Merrill CRNA    Indications and Patient Condition  Indications for airway management: airway protection    Preoxygenated: yes      Final Airway Details  Final airway type: endotracheal airway      Successful airway: ETT  Cuffed: yes   Successful intubation technique: direct laryngoscopy  Endotracheal tube insertion site: oral  Blade: Acosta  Blade size: 2  ETT size (mm): 7.5  Cormack-Lehane Classification: grade I - full view of glottis  Placement verified by: chest auscultation and capnometry   Number of attempts at approach: 1  Assessment: lips, teeth, and gum same as pre-op and atraumatic intubation            
50

## 2024-08-24 NOTE — CONSULT NOTE ADULT - PROBLEM SELECTOR RECOMMENDATION 3
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

## 2024-08-24 NOTE — CONSULT NOTE ADULT - ASSESSMENT
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 Associated w 1 episode of diarrhea. In ED, AVSS, WBC wnl. CT scan showing rectosigmoid diverticulitis with 5 mm intramural abscess now admitted to surgery service

## 2024-08-24 NOTE — CONSULT NOTE ADULT - SUBJECTIVE AND OBJECTIVE BOX
Subjective:      Dominick Martins is a 14 y.o. male here with father. Patient brought in for Recheck (ears went to urgent care)      History of Present Illness:  Otalgia    There is pain in the right ear. This is a new problem. The current episode started in the past 7 days (went to urgent care two days ago. Placed on amoxicillin and ciprodex drops). The problem occurs every few hours. The problem has been waxing and waning. There has been no fever. Pertinent negatives include no abdominal pain, coughing, diarrhea, headaches, rash, rhinorrhea, sore throat or vomiting. He has tried ear drops and antibiotics (amoxicillin and ciprodex from urgent care) for the symptoms. The treatment provided significant relief.       Review of Systems   Constitutional: Negative for activity change, appetite change and fever.   HENT: Positive for ear pain (currently improving with amoxicillin and ciprodex from urgent care). Negative for congestion, rhinorrhea and sore throat.    Eyes: Negative for discharge and redness.   Respiratory: Negative for cough.    Gastrointestinal: Negative for abdominal pain, diarrhea and vomiting.   Genitourinary: Negative for decreased urine volume.   Skin: Negative for rash.   Neurological: Negative for headaches.       Objective:     Physical Exam   Constitutional: Vital signs are normal. He appears well-developed and well-nourished. He is active. No distress.   HENT:   Head: Normocephalic and atraumatic. No signs of injury. There is normal jaw occlusion.   Right Ear: External ear, pinna and canal normal. Ear canal is not visually occluded. Tympanic membrane is erythematous. Tympanic membrane is not bulging. A middle ear effusion is present. No PE tube.   Left Ear: Tympanic membrane, external ear, pinna and canal normal. Ear canal is not visually occluded. Tympanic membrane is not erythematous and not bulging.  No PE tube.   Nose: Nose normal. No mucosal edema, rhinorrhea, nasal discharge or congestion.    Mouth/Throat: Mucous membranes are moist. Dentition is normal. No tonsillar exudate. Oropharynx is clear. Pharynx is normal.   Eyes: Visual tracking is normal. Conjunctivae and EOM are normal. Right eye exhibits no discharge and no exudate. Left eye exhibits no discharge and no exudate. Right conjunctiva is not injected. Left conjunctiva is not injected.   Neck: Normal range of motion and full passive range of motion without pain. Neck supple. No neck adenopathy.   Cardiovascular: Normal rate, regular rhythm, S1 normal and S2 normal. Pulses are palpable.   No murmur heard.  Pulmonary/Chest: Effort normal and breath sounds normal. No stridor. No respiratory distress. Air movement is not decreased. He has no wheezes. He has no rhonchi. He has no rales. He exhibits no retraction.   Abdominal: Soft. Bowel sounds are normal. He exhibits no distension. There is no tenderness. There is no guarding.   Musculoskeletal: Normal range of motion.   Neurological: He is alert.   Skin: Skin is warm and dry. Capillary refill takes less than 2 seconds. No rash noted.   Psychiatric: He has a normal mood and affect. His speech is normal and behavior is normal. Judgment and thought content normal. Cognition and memory are normal. He is attentive.   Nursing note and vitals reviewed.      Assessment:        1. Acute suppurative otitis media of right ear without spontaneous rupture of tympanic membrane, recurrence not specified         Plan:       Dominick was seen today for recheck.    Diagnoses and all orders for this visit:    Acute suppurative otitis media of right ear without spontaneous rupture of tympanic membrane, recurrence not specified   Otitis is improving based on reports that there was pus in his canal at urgent care. Canal is clear today. Continue with current regimen and RTC as needed. Father and child verbalized understanding.     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 Associated w 1 episode of diarrhea. In ED, AVSS, WBC wnl. CT scan showing rectosigmoid diverticulitis with 5 mm intramural abscess. Patient states she has never had this pain before. States her last colonoscopy was in 2016 and was within normal limits and she was told she didn't need another colonoscopy. Denies fever, chills, SOB, CP. States she has constipation at baseline and takes senna every night. 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     HTN (hypertension)      UTI (urinary tract infection), bacterial      2019 novel coronavirus disease (COVID-19)  in 2020 March      History of cholecystectomy      H/O lysis of adhesions          Review of Systems:   CONSTITUTIONAL: No fever, weight loss, or fatigue  EYES: No eye pain, visual disturbances, or discharge  ENMT:  No difficulty hearing, tinnitus, vertigo; No sinus or throat pain  NECK: No pain or stiffness  RESPIRATORY: No cough, wheezing, chills or hemoptysis; No shortness of breath  CARDIOVASCULAR: No chest pain, palpitations, dizziness, leg swelling or sob  GASTROINTESTINAL: see above HPI   GENITOURINARY: No dysuria, frequency, hematuria, or incontinence    Allergies    penicillin (Unknown)      Social History: non smoker  no IVDA  no ETOH abuse   lives with family     FAMILY HISTORY:  No pertinent family history in first degree relatives        MEDICATIONS  (STANDING):  apixaban 2.5 milliGRAM(s) Oral two times a day  aspirin enteric coated 81 milliGRAM(s) Oral daily  atenolol  Tablet 25 milliGRAM(s) Oral daily  ciprofloxacin   IVPB 400 milliGRAM(s) IV Intermittent every 12 hours  escitalopram 15 milliGRAM(s) Oral daily  lactated ringers. 1000 milliLiter(s) (100 mL/Hr) IV Continuous <Continuous>  metroNIDAZOLE  IVPB 500 milliGRAM(s) IV Intermittent every 12 hours    MEDICATIONS  (PRN):  acetaminophen   IVPB .. 1000 milliGRAM(s) IV Intermittent every 6 hours PRN Mild Pain (1 - 3), Moderate Pain (4 - 6)      CAPILLARY BLOOD GLUCOSE        I&O's Summary    23 Aug 2024 07:01  -  24 Aug 2024 07:00  --------------------------------------------------------  IN: 1200 mL / OUT: 800 mL / NET: 400 mL        24hrs Vital:  T(C): 36.6 (08-24-24 @ 09:24), Max: 36.7 (08-23-24 @ 19:24)  HR: 57 (08-24-24 @ 09:24) (55 - 61)  BP: 164/87 (08-24-24 @ 09:24) (150/81 - 189/86)  RR: 18 (08-24-24 @ 09:24) (15 - 18)  SpO2: 95% (08-24-24 @ 09:24) (95% - 96%)    PHYSICAL EXAM:  EYES: EOMI, PERRLA  NECK: Supple, No JVD  CHEST/LUNG: Clear   HEART: S1S2; No rubs, or gallops, no murmurs  ABDOMEN: Soft, Nontender; Bowel sounds present  EXTREMITIES:  + Peripheral Pulses, No clubbing or cyanosis, no edema  PSYCH: AO x 3,   NEUROLOGY: Alert, no focal motor or sensory deficits  SKIN: No rashes or lesions    LABS:                        12.4   7.38  )-----------( 630      ( 24 Aug 2024 06:57 )             37.8     08-24    137  |  101  |  12  ----------------------------<  100<H>  4.0   |  25  |  0.62    Ca    9.6      24 Aug 2024 06:57  Phos  2.9     08-24  Mg     1.9     08-24    TPro  7.1  /  Alb  4.1  /  TBili  0.5  /  DBili  x   /  AST  26  /  ALT  30  /  AlkPhos  111  08-23          Urinalysis Basic - ( 24 Aug 2024 06:57 )    Color: x / Appearance: x / SG: x / pH: x  Gluc: 100 mg/dL / Ketone: x  / Bili: x / Urobili: x   Blood: x / Protein: x / Nitrite: x   Leuk Esterase: x / RBC: x / WBC x   Sq Epi: x / Non Sq Epi: x / Bacteria: x        RADIOLOGY & ADDITIONAL TESTS:    Consultant(s) Notes Reviewed:      Care Discussed with Consultants/Other Providers:

## 2024-08-25 VITALS
RESPIRATION RATE: 18 BRPM | HEART RATE: 61 BPM | DIASTOLIC BLOOD PRESSURE: 84 MMHG | TEMPERATURE: 98 F | OXYGEN SATURATION: 93 % | SYSTOLIC BLOOD PRESSURE: 157 MMHG

## 2024-08-25 LAB
ANION GAP SERPL CALC-SCNC: 11 MMOL/L — SIGNIFICANT CHANGE UP (ref 5–17)
BUN SERPL-MCNC: 12 MG/DL — SIGNIFICANT CHANGE UP (ref 7–23)
CALCIUM SERPL-MCNC: 9.2 MG/DL — SIGNIFICANT CHANGE UP (ref 8.4–10.5)
CHLORIDE SERPL-SCNC: 103 MMOL/L — SIGNIFICANT CHANGE UP (ref 96–108)
CO2 SERPL-SCNC: 25 MMOL/L — SIGNIFICANT CHANGE UP (ref 22–31)
CREAT SERPL-MCNC: 0.83 MG/DL — SIGNIFICANT CHANGE UP (ref 0.5–1.3)
EGFR: 69 ML/MIN/1.73M2 — SIGNIFICANT CHANGE UP
GLUCOSE SERPL-MCNC: 157 MG/DL — HIGH (ref 70–99)
HCT VFR BLD CALC: 35.9 % — SIGNIFICANT CHANGE UP (ref 34.5–45)
HGB BLD-MCNC: 11.7 G/DL — SIGNIFICANT CHANGE UP (ref 11.5–15.5)
MAGNESIUM SERPL-MCNC: 1.8 MG/DL — SIGNIFICANT CHANGE UP (ref 1.6–2.6)
MCHC RBC-ENTMCNC: 30 PG — SIGNIFICANT CHANGE UP (ref 27–34)
MCHC RBC-ENTMCNC: 32.6 GM/DL — SIGNIFICANT CHANGE UP (ref 32–36)
MCV RBC AUTO: 92.1 FL — SIGNIFICANT CHANGE UP (ref 80–100)
NRBC # BLD: 0 /100 WBCS — SIGNIFICANT CHANGE UP (ref 0–0)
PHOSPHATE SERPL-MCNC: 2.7 MG/DL — SIGNIFICANT CHANGE UP (ref 2.5–4.5)
PLATELET # BLD AUTO: 582 K/UL — HIGH (ref 150–400)
POTASSIUM SERPL-MCNC: 4.1 MMOL/L — SIGNIFICANT CHANGE UP (ref 3.5–5.3)
POTASSIUM SERPL-SCNC: 4.1 MMOL/L — SIGNIFICANT CHANGE UP (ref 3.5–5.3)
RBC # BLD: 3.9 M/UL — SIGNIFICANT CHANGE UP (ref 3.8–5.2)
RBC # FLD: 14.9 % — HIGH (ref 10.3–14.5)
SODIUM SERPL-SCNC: 139 MMOL/L — SIGNIFICANT CHANGE UP (ref 135–145)
WBC # BLD: 7.44 K/UL — SIGNIFICANT CHANGE UP (ref 3.8–10.5)
WBC # FLD AUTO: 7.44 K/UL — SIGNIFICANT CHANGE UP (ref 3.8–10.5)

## 2024-08-25 PROCEDURE — 99232 SBSQ HOSP IP/OBS MODERATE 35: CPT

## 2024-08-25 RX ORDER — METRONIDAZOLE 250 MG
1 TABLET ORAL
Qty: 42 | Refills: 0
Start: 2024-08-25 | End: 2024-09-07

## 2024-08-25 RX ADMIN — Medication 81 MILLIGRAM(S): at 12:35

## 2024-08-25 RX ADMIN — APIXABAN 2.5 MILLIGRAM(S): 5 TABLET, FILM COATED ORAL at 05:28

## 2024-08-25 RX ADMIN — Medication 100 MILLIGRAM(S): at 05:43

## 2024-08-25 RX ADMIN — ENALAPRIL MALEATE 2.5 MILLIGRAM(S): 5 TABLET ORAL at 10:09

## 2024-08-25 RX ADMIN — ESCITALOPRAM OXALATE 15 MILLIGRAM(S): 10 TABLET ORAL at 12:35

## 2024-08-25 RX ADMIN — Medication 200 MILLIGRAM(S): at 05:29

## 2024-08-25 NOTE — DISCHARGE NOTE PROVIDER - HOSPITAL COURSE
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 Associated w 1 episode of diarrhea. In ED, AVSS, WBC wnl. CT scan showing rectosigmoid diverticulitis with 5 mm intramural abscess.  Patient admitted for monitoring and started on IV cipro/flagyl and fluid repletion..  Internal medicine consulted.  Over her two day hospital stay, patient recovered well and felt better on day of discharge.  She was tolerating a regular diet and ambulating independently.  No surgical intervention was warranted and she was discharged on a two week course of cipro/flagyl.

## 2024-08-25 NOTE — PROGRESS NOTE ADULT - SUBJECTIVE AND OBJECTIVE BOX
Patient is a 88y old  Female who presents with a chief complaint of complicated diverticulitis (25 Aug 2024 12:10)      DATE OF SERVICE: 08-25-24 @ 12:30    SUBJECTIVE / OVERNIGHT EVENTS: overnight events noted    ROS:  Resp: No cough no sputum production  CVS: No chest pain no palpitations no orthopnea  GI: no N/V/D    MEDICATIONS  (STANDING):  apixaban 2.5 milliGRAM(s) Oral two times a day  aspirin enteric coated 81 milliGRAM(s) Oral daily  atenolol  Tablet 25 milliGRAM(s) Oral daily  ciprofloxacin   IVPB 400 milliGRAM(s) IV Intermittent every 12 hours  enalapril 2.5 milliGRAM(s) Oral every 24 hours  enalapril 5 milliGRAM(s) Oral every 24 hours  escitalopram 15 milliGRAM(s) Oral daily  metroNIDAZOLE  IVPB 500 milliGRAM(s) IV Intermittent every 12 hours    MEDICATIONS  (PRN):  acetaminophen   IVPB .. 1000 milliGRAM(s) IV Intermittent every 6 hours PRN Mild Pain (1 - 3), Moderate Pain (4 - 6)        CAPILLARY BLOOD GLUCOSE        I&O's Summary    24 Aug 2024 07:01  -  25 Aug 2024 07:00  --------------------------------------------------------  IN: 1070 mL / OUT: 400 mL / NET: 670 mL        Vital Signs Last 24 Hrs  T(C): 36.7 (25 Aug 2024 08:50), Max: 36.9 (24 Aug 2024 23:31)  T(F): 98 (25 Aug 2024 08:50), Max: 98.4 (24 Aug 2024 23:31)  HR: 61 (25 Aug 2024 08:50) (58 - 71)  BP: 157/84 (25 Aug 2024 08:50) (117/71 - 165/89)  BP(mean): --  RR: 18 (25 Aug 2024 08:50) (18 - 18)  SpO2: 93% (25 Aug 2024 08:50) (93% - 96%)    PHYSICAL EXAM:  CHEST/LUNG: clear  HEART: S1 S2; no murmurs   ABDOMEN: Soft, Nontender  EXTREMITIES:  no edema  NEUROLOGY: AO x 3 non-focal  SKIN: No rashes or lesions    LABS:                        11.7   7.44  )-----------( 582      ( 25 Aug 2024 07:51 )             35.9     08-25    139  |  103  |  12  ----------------------------<  157<H>  4.1   |  25  |  0.83    Ca    9.2      25 Aug 2024 07:51  Phos  2.7     08-25  Mg     1.8     08-25            Urinalysis Basic - ( 25 Aug 2024 07:51 )    Color: x / Appearance: x / SG: x / pH: x  Gluc: 157 mg/dL / Ketone: x  / Bili: x / Urobili: x   Blood: x / Protein: x / Nitrite: x   Leuk Esterase: x / RBC: x / WBC x   Sq Epi: x / Non Sq Epi: x / Bacteria: x          All consultant(s) notes reviewed and care discussed with other providers        Contact Number, Dr Mathew [phone]
SUBJECTIVE:  Patient seen and examined at bedside, feeling better.  Ready to go home.    OBJECTIVE:  Vital Signs Last 24 Hrs  T(C): 36.7 (25 Aug 2024 08:50), Max: 36.9 (24 Aug 2024 23:31)  T(F): 98 (25 Aug 2024 08:50), Max: 98.4 (24 Aug 2024 23:31)  HR: 61 (25 Aug 2024 08:50) (58 - 71)  BP: 157/84 (25 Aug 2024 08:50) (117/71 - 165/89)  BP(mean): --  RR: 18 (25 Aug 2024 08:50) (18 - 18)  SpO2: 93% (25 Aug 2024 08:50) (93% - 96%)    Parameters below as of 25 Aug 2024 08:50  Patient On (Oxygen Delivery Method): room air        Physical Examination:  GEN: NAD, resting quietly  NEURO: AAOx3, CN II-XII grossly intact, no focal deficits  PULM: symmetric chest rise bilaterally, no increased WOB  ABD: soft, nontender, nondistended  EXTR: no lower extremity edema, moving all extremities  
Surgery Progress Note    S: Patient seen and evaluated bedside, feeling better today.     O:   T(C): 36.6 (08-24-24 @ 09:24), Max: 36.7 (08-23-24 @ 19:24)  HR: 57 (08-24-24 @ 09:24) (55 - 61)  BP: 164/87 (08-24-24 @ 09:24) (150/81 - 189/86)  RR: 18 (08-24-24 @ 09:24) (15 - 18)  SpO2: 95% (08-24-24 @ 09:24) (95% - 96%)      08-23-24 @ 07:01  -  08-24-24 @ 07:00  --------------------------------------------------------  IN:    Lactated Ringers: 1200 mL  Total IN: 1200 mL    OUT:    Voided (mL): 800 mL  Total OUT: 800 mL    Total NET: 400 mL          acetaminophen   IVPB .. 1000 milliGRAM(s) IV Intermittent every 6 hours PRN  apixaban 2.5 milliGRAM(s) Oral two times a day  aspirin enteric coated 81 milliGRAM(s) Oral daily  atenolol  Tablet 25 milliGRAM(s) Oral daily  ciprofloxacin   IVPB 400 milliGRAM(s) IV Intermittent every 12 hours  escitalopram 15 milliGRAM(s) Oral daily  lactated ringers. 1000 milliLiter(s) IV Continuous <Continuous>  metroNIDAZOLE  IVPB 500 milliGRAM(s) IV Intermittent every 12 hours    Physical Exam  General Appearance: Appears well, NAD  Neck: Supple  Chest: Equal expansion bilaterally, equal breath sounds  CV: Pulse regular presently  Abdomen: Soft, non distended, minimally tender  Extremities: Grossly symmetric, SCD's in place

## 2024-08-25 NOTE — PROGRESS NOTE ADULT - ASSESSMENT
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 Associated w 1 episode of diarrhea. In ED, AVSS, WBC wnl. CT scan showing rectosigmoid diverticulitis with 5 mm intramural abscess. Patient has complicated Hinchey 1a diverticulitis.    Plan:  - CLD, advance as tolerated  - IVF  - IV abx: cipro, flagyl due to penicillin allergy  - c/w home meds  - exam before pain meds  - oob as tolerating  - DVT ppx    [facility]  [phone]  
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 Associated w 1 episode of diarrhea. In ED, AVSS, WBC wnl. CT scan showing rectosigmoid diverticulitis with 5 mm intramural abscess. Patient has complicated Hinchey 1a diverticulitis.    Plan:  - LRD  - Dc today  - IVF  - IV abx: cipro, flagyl due to penicillin allergy  - c/w home meds  - exam before pain meds  - oob as tolerating  - DVT ppx    Red Surgery  [phone]
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 Associated w 1 episode of diarrhea. In ED, AVSS, WBC wnl. CT scan showing rectosigmoid diverticulitis with 5 mm intramural abscess now admitted to surgery service

## 2024-08-25 NOTE — DISCHARGE NOTE NURSING/CASE MANAGEMENT/SOCIAL WORK - PATIENT PORTAL LINK FT
You can access the FollowMyHealth Patient Portal offered by [facility] by registering at the following website: [email or web address]. By joining [facility]’s FollowMyHealth portal, you will also be able to view your health information using other applications (apps) compatible with our system.

## 2024-08-25 NOTE — DISCHARGE NOTE PROVIDER - NSDCCPCAREPLAN_GEN_ALL_CORE_FT
PRINCIPAL DISCHARGE DIAGNOSIS  Diagnosis: Diverticulitis of large intestine with abscess without bleeding  Assessment and Plan of Treatment:

## 2024-08-25 NOTE — DISCHARGE NOTE PROVIDER - NSDCMRMEDTOKEN_GEN_ALL_CORE_FT
aspirin 81 mg oral delayed release tablet: 1 tab(s) orally once a day  atenolol 25 mg oral tablet: 0.5 tab(s) orally once a day  atorvastatin 40 mg oral tablet: 1 tab(s) orally once a day (at bedtime)  Cipro 500 mg oral tablet: 1 tab(s) orally 2 times a day  Eliquis 2.5 mg oral tablet: 1 tab(s) orally 2 times a day  enalapril 2.5 mg oral tablet: 1 tab(s) orally 2 times a day 1 tablet in the morning and 2 tablets in the evening  escitalopram 10 mg oral tablet: 1.5 tab(s) orally once a day  metroNIDAZOLE 500 mg oral tablet: 1 tab(s) orally 3 times a day  pantoprazole 40 mg oral delayed release tablet: 1 tab(s) orally once a day (before a meal)

## 2024-08-25 NOTE — DISCHARGE NOTE PROVIDER - CARE PROVIDER_API CALL
Darius Looney  Colon/Rectal Surgery  [address], [address]  [place] [address]  Phone: [phone]  Fax: [phone]  Follow Up Time:

## 2024-08-25 NOTE — DISCHARGE NOTE NURSING/CASE MANAGEMENT/SOCIAL WORK - NSDCVIVACCINE_GEN_ALL_CORE_FT
Tdap; 14-Nov-2022 11:07; Cara Wright (ROHAN); [employer]; [id number] (Exp. Date: 01-Jun-2024); IntraMuscular; Deltoid Right.; 0.5 milliLiter(s); VIS (VIS Published: 09-May-2013, VIS Presented: 14-Nov-2022);

## 2024-08-25 NOTE — DISCHARGE NOTE NURSING/CASE MANAGEMENT/SOCIAL WORK - NSDCPEFALRISK_GEN_ALL_CORE
For information on Fall & Injury Prevention, visit: [email or web address] OR  [email or web address] OR  https://www.cdc.gov/steadi/patient.html

## 2024-09-11 ENCOUNTER — APPOINTMENT (OUTPATIENT)
Dept: COLORECTAL SURGERY | Facility: CLINIC | Age: 89
End: 2024-09-11

## 2024-09-22 ENCOUNTER — INPATIENT (INPATIENT)
Facility: HOSPITAL | Age: 89
LOS: 4 days | Discharge: HOME | DRG: 93 | End: 2024-09-27
Attending: INTERNAL MEDICINE | Admitting: STUDENT IN AN ORGANIZED HEALTH CARE EDUCATION/TRAINING PROGRAM
Payer: MEDICARE

## 2024-09-22 VITALS
SYSTOLIC BLOOD PRESSURE: 208 MMHG | OXYGEN SATURATION: 99 % | HEART RATE: 68 BPM | RESPIRATION RATE: 17 BRPM | DIASTOLIC BLOOD PRESSURE: 95 MMHG | TEMPERATURE: 98 F | HEIGHT: 65 IN

## 2024-09-22 DIAGNOSIS — Z90.49 ACQUIRED ABSENCE OF OTHER SPECIFIED PARTS OF DIGESTIVE TRACT: Chronic | ICD-10-CM

## 2024-09-22 DIAGNOSIS — Z98.890 OTHER SPECIFIED POSTPROCEDURAL STATES: Chronic | ICD-10-CM

## 2024-09-22 DIAGNOSIS — R26.81 UNSTEADINESS ON FEET: ICD-10-CM

## 2024-09-22 LAB
ALBUMIN SERPL ELPH-MCNC: 4.7 G/DL — SIGNIFICANT CHANGE UP (ref 3.3–5)
ALP SERPL-CCNC: 91 U/L — SIGNIFICANT CHANGE UP (ref 40–120)
ALT FLD-CCNC: 40 U/L — SIGNIFICANT CHANGE UP (ref 10–45)
ANION GAP SERPL CALC-SCNC: 12 MMOL/L — SIGNIFICANT CHANGE UP (ref 5–17)
APTT BLD: 37.1 SEC — HIGH (ref 24.5–35.6)
AST SERPL-CCNC: 36 U/L — SIGNIFICANT CHANGE UP (ref 10–40)
BASOPHILS # BLD AUTO: 0.05 K/UL — SIGNIFICANT CHANGE UP (ref 0–0.2)
BASOPHILS NFR BLD AUTO: 0.9 % — SIGNIFICANT CHANGE UP (ref 0–2)
BILIRUB SERPL-MCNC: 0.6 MG/DL — SIGNIFICANT CHANGE UP (ref 0.2–1.2)
BUN SERPL-MCNC: 12 MG/DL — SIGNIFICANT CHANGE UP (ref 7–23)
CALCIUM SERPL-MCNC: 10.5 MG/DL — SIGNIFICANT CHANGE UP (ref 8.4–10.5)
CHLORIDE SERPL-SCNC: 100 MMOL/L — SIGNIFICANT CHANGE UP (ref 96–108)
CO2 SERPL-SCNC: 25 MMOL/L — SIGNIFICANT CHANGE UP (ref 22–31)
CREAT SERPL-MCNC: 0.57 MG/DL — SIGNIFICANT CHANGE UP (ref 0.5–1.3)
EGFR: 88 ML/MIN/1.73M2 — SIGNIFICANT CHANGE UP
EOSINOPHIL # BLD AUTO: 0.11 K/UL — SIGNIFICANT CHANGE UP (ref 0–0.5)
EOSINOPHIL NFR BLD AUTO: 1.9 % — SIGNIFICANT CHANGE UP (ref 0–6)
GLUCOSE SERPL-MCNC: 108 MG/DL — HIGH (ref 70–99)
HCT VFR BLD CALC: 42 % — SIGNIFICANT CHANGE UP (ref 34.5–45)
HGB BLD-MCNC: 13.6 G/DL — SIGNIFICANT CHANGE UP (ref 11.5–15.5)
IMM GRANULOCYTES NFR BLD AUTO: 0.4 % — SIGNIFICANT CHANGE UP (ref 0–0.9)
INR BLD: 1.42 RATIO — HIGH (ref 0.85–1.18)
LYMPHOCYTES # BLD AUTO: 1.14 K/UL — SIGNIFICANT CHANGE UP (ref 1–3.3)
LYMPHOCYTES # BLD AUTO: 20.1 % — SIGNIFICANT CHANGE UP (ref 13–44)
MCHC RBC-ENTMCNC: 29.5 PG — SIGNIFICANT CHANGE UP (ref 27–34)
MCHC RBC-ENTMCNC: 32.4 GM/DL — SIGNIFICANT CHANGE UP (ref 32–36)
MCV RBC AUTO: 91.1 FL — SIGNIFICANT CHANGE UP (ref 80–100)
MONOCYTES # BLD AUTO: 0.52 K/UL — SIGNIFICANT CHANGE UP (ref 0–0.9)
MONOCYTES NFR BLD AUTO: 9.2 % — SIGNIFICANT CHANGE UP (ref 2–14)
NEUTROPHILS # BLD AUTO: 3.84 K/UL — SIGNIFICANT CHANGE UP (ref 1.8–7.4)
NEUTROPHILS NFR BLD AUTO: 67.5 % — SIGNIFICANT CHANGE UP (ref 43–77)
NRBC # BLD: 0 /100 WBCS — SIGNIFICANT CHANGE UP (ref 0–0)
PLATELET # BLD AUTO: 700 K/UL — HIGH (ref 150–400)
POTASSIUM SERPL-MCNC: 4.4 MMOL/L — SIGNIFICANT CHANGE UP (ref 3.5–5.3)
POTASSIUM SERPL-SCNC: 4.4 MMOL/L — SIGNIFICANT CHANGE UP (ref 3.5–5.3)
PROT SERPL-MCNC: 8.1 G/DL — SIGNIFICANT CHANGE UP (ref 6–8.3)
PROTHROM AB SERPL-ACNC: 14.8 SEC — HIGH (ref 9.5–13)
RBC # BLD: 4.61 M/UL — SIGNIFICANT CHANGE UP (ref 3.8–5.2)
RBC # FLD: 15.3 % — HIGH (ref 10.3–14.5)
SODIUM SERPL-SCNC: 137 MMOL/L — SIGNIFICANT CHANGE UP (ref 135–145)
TROPONIN T, HIGH SENSITIVITY RESULT: 12 NG/L — SIGNIFICANT CHANGE UP (ref 0–51)
WBC # BLD: 5.68 K/UL — SIGNIFICANT CHANGE UP (ref 3.8–10.5)
WBC # FLD AUTO: 5.68 K/UL — SIGNIFICANT CHANGE UP (ref 3.8–10.5)

## 2024-09-22 PROCEDURE — 99285 EMERGENCY DEPT VISIT HI MDM: CPT | Mod: GC

## 2024-09-22 PROCEDURE — 70498 CT ANGIOGRAPHY NECK: CPT | Mod: 26,MC

## 2024-09-22 PROCEDURE — 99223 1ST HOSP IP/OBS HIGH 75: CPT

## 2024-09-22 PROCEDURE — 70496 CT ANGIOGRAPHY HEAD: CPT | Mod: 26,MC

## 2024-09-22 PROCEDURE — 0042T: CPT | Mod: MC

## 2024-09-22 RX ORDER — METOCLOPRAMIDE HCL 5 MG
10 TABLET ORAL ONCE
Refills: 0 | Status: COMPLETED | OUTPATIENT
Start: 2024-09-22 | End: 2024-09-22

## 2024-09-22 RX ORDER — MECLIZINE HYDROCLORIDE 25 MG/1
25 TABLET ORAL ONCE
Refills: 0 | Status: COMPLETED | OUTPATIENT
Start: 2024-09-22 | End: 2024-09-22

## 2024-09-22 NOTE — ED PROVIDER NOTE - CLINICAL SUMMARY MEDICAL DECISION MAKING FREE TEXT BOX
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 DDx includes but not limited to: CVA, vertigo. Plan: blood work, brain imaging, UA, neuro recs. Will re-assess. Likely tba. 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 DDx includes but not limited to: CVA, vertigo. Plan: blood work, brain imaging, UA, neuro recs. Will re-assess. Likely tba.    Attending Nello: See attending attestation.

## 2024-09-22 NOTE — ED PROVIDER NOTE - OBJECTIVE STATEMENT
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  Daughter at bedside for collateral.  Patient was shopping and at 1:30 PM she started having dizziness, which she describes as an unsteadiness sensation and also felt like her gait was off balance.  At that time also had some nausea, which has resolved.  Denies fever, recent illness, chest pain, shortness of breath, abdominal pain, weakness, numbness, tingling.

## 2024-09-22 NOTE — ED PROVIDER NOTE - CARE PLAN
Principal Discharge DX:	Unsteady gait   1 Principal Discharge DX:	Unsteady gait  Secondary Diagnosis:	Primary thrombocytosis

## 2024-09-22 NOTE — H&P ADULT - PROBLEM SELECTOR PLAN 5
followed with Heme (Dr. Bowman)   - patient was placed on Eliquis long term for unprovoked PE in Nov 2022 in the setting of thrombocytosis (600-700).  ASA was added in June 2024 for "mini stroke" during hospitalization for similar symptom of dizziness, MRI brain at that time showed centrum semiovale infarct,  - platelets stable at baseline

## 2024-09-22 NOTE — H&P ADULT - HISTORY OF PRESENT ILLNESS
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  Daughter at bedside for collateral.  Patient was shopping and at 1:30 PM she started having dizziness, which she describes as an unsteadiness sensation and also felt like her gait was off balance.  At that time also had some nausea, which has resolved.  Denies fever, recent illness, chest pain, shortness of breath, abdominal pain, weakness, numbness, tingling. 88 year-old female with HTN, HLD, thrombocytosis with unprovoked PE in 2022 (on Eliquis), TIA (on asa), cholecystectomy, recent diverticulitis presenting as a code stroke for dizziness and unsteady gait.  Patient was shopping and at 1:30 PM when she started having dizziness with associated nausea.  She held on the shopping cart due to unsteadiness, drove home, felt like she was going to pass out (but no LOC), laid down on the couch until daughter arrived to bring her to ED. Symptoms now completely resolved without taking Meclizine or Reglan ED ordered.  Currently, no complaints

## 2024-09-22 NOTE — ED PROVIDER NOTE - PROGRESS NOTE DETAILS
Wellington Morocho DO (PGY3)  Received signout on this patient, 88-year-old female past medical of hypertension and TIA presenting as a code stroke for dizziness and unsteady gait.  Code stroke imaging within normal limits however neurology evaluated the patient.  Would like admission to medicine as patient has thrombocytosis with platelet count of 700 and MRI.

## 2024-09-22 NOTE — H&P ADULT - PROBLEM SELECTOR PLAN 2
currently uncontrolled, missed evening dose of Enalapril 5mg and Atenolol 12.5 mg.  Home BP readings reviewed (-140)  - will continue home anti-hypertensive regimen   - will monitor blood pressures (goal -160 in the next 24 hrs)

## 2024-09-22 NOTE — CONSULT NOTE ADULT - SUBJECTIVE AND OBJECTIVE BOX
**STROKE CODE CONSULT NOTE**    Last known well time/Time of onset of symptoms:  @1330    HPI: Ms. Cydney Powell is an 88-year old female with PMHx HTN, HLD, PE on Eliquis, TIA on aspirin, diverticulitis who presented to the ED as code stroke for episode of dizziness. Patient was in her usual state of health around 1330 today and was in the supermarket when she experienced sudden onset of a "funny feeling" in her head described as "fogginess". She also felt nauseous and unsteady requiring her to hold on to her shopping cart. She was able to make it to her car and drive home independently. Once she got home, she felt faint and had to lie down, which somewhat improved her symptoms. Patient was then brought into the ED for evaluation. At the time of the code stroke, patient's symptoms have mostly resolved. I    Of note, patient had a very similar episode in  at which time she also presented to the hospital. She had a TTE done which demonstrated EF 50% with a moderately dilated left atrium. She also was seen by neurology at the time and underwent MRI brain which demonstrated R centrum semiovale infarct without correlating symptoms. At that time, patient was started on aspirin in addition to her Eliquis for stroke prevention.    Patient reports she is very active and independent at baseline. Per daughter she has some baseline gait unsteadiness and "sometimes falls to the side". Denies recent falls. Denies history of smoking or alcohol use. Denies recent fever, chills, nausea, vomiting, diarrhea.   Daughter also mentioned that patient has had a "very stressful" last 2 days, including attending her brother-in-laws  and spending all day out of the house.  Patient reports that she checks her blood pressure every day, and this morning it was 110s/80s.    BP: 208/95  FS: 108  pre-mRS: 186F     PAST MEDICAL & SURGICAL HISTORY:  HTN (hypertension)      UTI (urinary tract infection), bacterial      2019 novel coronavirus disease (COVID-19)  in 2020      History of cholecystectomy      H/O lysis of adhesions          FAMILY HISTORY:  No pertinent family history in first degree relatives        SOCIAL HISTORY:  Smoking Cessation: Nonsmoker    ROS:  Constitutional: No fever, weight loss or fatigue  Eyes: No eye pain, visual disturbances, or discharge  ENMT:  No difficulty hearing, tinnitus, vertigo; No sinus or throat pain  Neck: No pain or stiffness  Respiratory: No cough, wheezing, chills or hemoptysis  Cardiovascular: No chest pain, palpitations, shortness of breath, dizziness or leg swelling  Gastrointestinal: No abdominal pain. +nausea. No vomiting or hematemesis; No diarrhea or constipation. Nohematochezia.  Genitourinary: No dysuria, frequency, hematuria or incontinence  Neurological: As per HPI  Skin: No itching, burning, rashes or lesions   Endocrine: No heat or cold intolerance; No hair loss  Musculoskeletal: No joint pain or swelling; No muscle, back or extremity pain  Psychiatric: No depression, anxiety, mood swings or difficulty sleeping  Heme/Lymph: No easy bruising or bleeding gums    MEDICATIONS  (STANDING):    MEDICATIONS  (PRN):      Allergies    penicillin (Unknown)    Intolerances        Vital Signs Last 24 Hrs  T(C): 36.7 (22 Sep 2024 19:21), Max: 36.7 (22 Sep 2024 19:21)  T(F): 98 (22 Sep 2024 19:21), Max: 98 (22 Sep 2024 19:21)  HR: 79 (22 Sep 2024 19:21) (68 - 79)  BP: 189/91 (22 Sep 2024 19:21) (189/91 - 208/95)  BP(mean): --  RR: 16 (22 Sep 2024 19:21) (16 - 17)  SpO2: 96% (22 Sep 2024 19:21) (95% - 99%)    Parameters below as of 22 Sep 2024 19:21  Patient On (Oxygen Delivery Method): room air        PHYSICAL EXAM:  Constitutional: WDWN; NAD    Neurologic:  Mental status: Awake, alert and oriented x3.  Recent and remote memory intact.  Naming, repetition and comprehension intact.  Attention/concentration intact.  No dysarthria, no aphasia.  Fund of knowledge appropriate.    Cranial nerves: Pupils equally round and reactive to light, visual fields full, no nystagmus, extraocular muscles intact, V1 through V3 intact bilaterally and symmetric, face symmetric, hearing intact to finger rub, palate elevation symmetric, tongue was midline, sternocleidomastoid/shoulder shrug strength bilaterally 5/5.    Motor:  Normal bulk and tone, strength 5/5 in bilateral upper and lower extremities.  strength 5/5.  Rapid alternating movements intact and symmetric.   Sensation: Intact to light touch, proprioception, and pinprick.  No neglect.   Coordination: No dysmetria on finger-to-nose and heel-to-shin.  No clumsiness.  Reflexes: 2+ in upper and lower extremities, downgoing toes bilaterally  Gait: Narrow and steady, +falls to the left.  Romberg positive    HINTS: No nystagmus, head impulse test no corrective saccade, no skew deviation    NIHSS: 0    Fingerstick Blood Glucose: CAPILLARY BLOOD GLUCOSE  95 (22 Sep 2024 17:43)      POCT Blood Glucose.: 95 mg/dL (22 Sep 2024 16:42)       LABS:                        13.6   5.68  )-----------( 700      ( 22 Sep 2024 17:00 )             42.0         137  |  100  |  12  ----------------------------<  108[H]  4.4   |  25  |  0.57    Ca    10.5      22 Sep 2024 17:00    TPro  8.1  /  Alb  4.7  /  TBili  0.6  /  DBili  x   /  AST  36  /  ALT  40  /  AlkPhos  91      PT/INR - ( 22 Sep 2024 17:00 )   PT: 14.8 sec;   INR: 1.42 ratio         PTT - ( 22 Sep 2024 17:00 )  PTT:37.1 sec      Urinalysis Basic - ( 22 Sep 2024 17:00 )    Color: x / Appearance: x / SG: x / pH: x  Gluc: 108 mg/dL / Ketone: x  / Bili: x / Urobili: x   Blood: x / Protein: x / Nitrite: x   Leuk Esterase: x / RBC: x / WBC x   Sq Epi: x / Non Sq Epi: x / Bacteria: x        RADIOLOGY & ADDITIONAL STUDIES:    < from: CT Brain Stroke Protocol (24 @ 17:04) >  IMPRESSION:  No acute intracranial hemorrhage, large acute territorial infarct, or   mass effect.    < end of copied text >  < from: CT Angio Brain Stroke Protocol  w/ IV Cont (24 @ 17:09) >  IMPRESSION:    CT PERFUSION:  Technical limitations: None.    Core infarction: 0 ml  Penumbra / tissue at risk for active ischemia: 0 ml    CTA NECK:  No evidence of significant stenosis or occlusion.    CTA HEAD:  No large vessel occlusion, significant stenosis or vascular abnormality   identified.    < end of copied text >   **STROKE CODE CONSULT NOTE**    Last known well time/Time of onset of symptoms:  @1330    HPI: Ms. Cydney Powell is an 88-year old female with PMHx HTN, HLD, PE on Eliquis, TIA on aspirin, diverticulitis who presented to the ED as code stroke for episode of dizziness. Patient was in her usual state of health around 1330 today and was in the supermarket when she experienced sudden onset of a "funny feeling" in her head described as "fogginess". She also felt nauseous and unsteady requiring her to hold on to her shopping cart. She was able to make it to her car and drive home independently. Once she got home, she felt faint and had to lie down, which somewhat improved her symptoms. Patient was then brought into the ED for evaluation. At the time of the code stroke, patient's symptoms have mostly resolved. I    Of note, patient had a very similar episode in  at which time she also presented to the hospital. She had a TTE done which demonstrated EF 50% with a moderately dilated left atrium. She also was seen by neurology at the time and underwent MRI brain which demonstrated R centrum semiovale infarct without correlating symptoms. At that time, patient was started on aspirin in addition to her Eliquis for stroke prevention.    Patient reports she is very active and independent at baseline. Per daughter she has some baseline gait unsteadiness and "sometimes falls to the side". Denies recent falls. Denies history of smoking or alcohol use. Denies recent fever, chills, nausea, vomiting, diarrhea.   Daughter also mentioned that patient has had a "very stressful" last 2 days, including attending her brother-in-laws  and spending all day out of the house.  Patient reports that she checks her blood pressure every day, and this morning it was 110s/80s.    BP: 208/95  FS: 108  pre-mRS: 1     PAST MEDICAL & SURGICAL HISTORY:  HTN (hypertension)      UTI (urinary tract infection), bacterial      2019 novel coronavirus disease (COVID-19)  in 2020      History of cholecystectomy      H/O lysis of adhesions          FAMILY HISTORY:  No pertinent family history in first degree relatives        SOCIAL HISTORY:  Smoking Cessation: Nonsmoker    ROS:  Constitutional: No fever, weight loss or fatigue  Eyes: No eye pain, visual disturbances, or discharge  ENMT:  No difficulty hearing, tinnitus, vertigo; No sinus or throat pain  Neck: No pain or stiffness  Respiratory: No cough, wheezing, chills or hemoptysis  Cardiovascular: No chest pain, palpitations, shortness of breath, dizziness or leg swelling  Gastrointestinal: No abdominal pain. +nausea. No vomiting or hematemesis; No diarrhea or constipation. Nohematochezia.  Genitourinary: No dysuria, frequency, hematuria or incontinence  Neurological: As per HPI  Skin: No itching, burning, rashes or lesions   Endocrine: No heat or cold intolerance; No hair loss  Musculoskeletal: No joint pain or swelling; No muscle, back or extremity pain  Psychiatric: No depression, anxiety, mood swings or difficulty sleeping  Heme/Lymph: No easy bruising or bleeding gums    MEDICATIONS  (STANDING):    MEDICATIONS  (PRN):      Allergies    penicillin (Unknown)    Intolerances        Vital Signs Last 24 Hrs  T(C): 36.7 (22 Sep 2024 19:21), Max: 36.7 (22 Sep 2024 19:21)  T(F): 98 (22 Sep 2024 19:21), Max: 98 (22 Sep 2024 19:21)  HR: 79 (22 Sep 2024 19:21) (68 - 79)  BP: 189/91 (22 Sep 2024 19:21) (189/91 - 208/95)  BP(mean): --  RR: 16 (22 Sep 2024 19:21) (16 - 17)  SpO2: 96% (22 Sep 2024 19:21) (95% - 99%)    Parameters below as of 22 Sep 2024 19:21  Patient On (Oxygen Delivery Method): room air        PHYSICAL EXAM:  Constitutional: WDWN; NAD    Neurologic:  Mental status: Awake, alert and oriented x3.  Recent and remote memory intact.  Naming, repetition and comprehension intact.  Attention/concentration intact.  No dysarthria, no aphasia.  Fund of knowledge appropriate.    Cranial nerves: Pupils equally round and reactive to light, visual fields full, no nystagmus, extraocular muscles intact, V1 through V3 intact bilaterally and symmetric, face symmetric, hearing intact to finger rub, palate elevation symmetric, tongue was midline, sternocleidomastoid/shoulder shrug strength bilaterally 5/5.    Motor:  Normal bulk and tone, strength 5/5 in bilateral upper and lower extremities.  strength 5/5.  Rapid alternating movements intact and symmetric.   Sensation: Intact to light touch, proprioception, and pinprick.  No neglect.   Coordination: No dysmetria on finger-to-nose and heel-to-shin.  No clumsiness.  Reflexes: 2+ in upper and lower extremities, downgoing toes bilaterally  Gait: Narrow and steady, +falls to the left.  Romberg positive    HINTS: No nystagmus, head impulse test no corrective saccade, no skew deviation    NIHSS: 0    Fingerstick Blood Glucose: CAPILLARY BLOOD GLUCOSE  95 (22 Sep 2024 17:43)      POCT Blood Glucose.: 95 mg/dL (22 Sep 2024 16:42)       LABS:                        13.6   5.68  )-----------( 700      ( 22 Sep 2024 17:00 )             42.0         137  |  100  |  12  ----------------------------<  108[H]  4.4   |  25  |  0.57    Ca    10.5      22 Sep 2024 17:00    TPro  8.1  /  Alb  4.7  /  TBili  0.6  /  DBili  x   /  AST  36  /  ALT  40  /  AlkPhos  91      PT/INR - ( 22 Sep 2024 17:00 )   PT: 14.8 sec;   INR: 1.42 ratio         PTT - ( 22 Sep 2024 17:00 )  PTT:37.1 sec      Urinalysis Basic - ( 22 Sep 2024 17:00 )    Color: x / Appearance: x / SG: x / pH: x  Gluc: 108 mg/dL / Ketone: x  / Bili: x / Urobili: x   Blood: x / Protein: x / Nitrite: x   Leuk Esterase: x / RBC: x / WBC x   Sq Epi: x / Non Sq Epi: x / Bacteria: x        RADIOLOGY & ADDITIONAL STUDIES:    < from: CT Brain Stroke Protocol (24 @ 17:04) >  IMPRESSION:  No acute intracranial hemorrhage, large acute territorial infarct, or   mass effect.    < end of copied text >  < from: CT Angio Brain Stroke Protocol  w/ IV Cont (24 @ 17:09) >  IMPRESSION:    CT PERFUSION:  Technical limitations: None.    Core infarction: 0 ml  Penumbra / tissue at risk for active ischemia: 0 ml    CTA NECK:  No evidence of significant stenosis or occlusion.    CTA HEAD:  No large vessel occlusion, significant stenosis or vascular abnormality   identified.    < end of copied text >

## 2024-09-22 NOTE — CONSULT NOTE ADULT - ATTENDING COMMENTS
DOS 9/23  code stroke called on arrival and neurology emergently assessed patient.   Briefly    88F with HTN. HLD, PE on Eliquis, TIA on aspirin, presented as code stroke for episode of dizziness/lightheadedness. Symptoms largely improved prior to presentation, however noted with elevated blood pressure >200 systolic (patient reports BP this morning was 110s). Had similar episode in June, was diagnosed as TIA. Initial VS in ED: /95. Exam: No focal neurologic deficits, +Romberg, falls to the left during ambulation (per daughter this is patient's baseline).    Stroke imaging was unremarkable.  Labs reviewed, noted for chronic thrombocytosis  pre-mRS: 1  LKN: 9/22 @1330  NIHSS: 0  Not a tenecteplase candidate due to nondisabling symptoms, recent Eliquis use.  Not a mechanical thrombectomy candidate due to no LVO.  CTH neg   CTA H/N neg   o/e 9/23 AM back to baseline     Impression:   1) Episode of lightheadedness and nausea which seems more consistent with presyncopal episode, which may have been brought on by stress or dehydration vs HTN urgency with SBP > 200s. Acute stroke vs TIA cannot be excluded.   2) h/o TIA     Recommendations:    Diagnostics:  - MRI brain without contrast   - Lipid panel, HbA1c  - Continue home Eliquis 2.5 BID  - Continue home ASA 81 mg PO    - Atorvastatin 80mg (titrate to LDL < 70)   - Lovenox SQ for DVT prophylaxis  - Check orthostatics   - Recommend further medicine workup for etiology of chronic thrombocytosis  - Gradual BP correction   - Telemonitoring  - BGM goals 140-180    Wesley Ritter MD  Vascular Neurology  Office: [phone]

## 2024-09-22 NOTE — ED ADULT TRIAGE NOTE - CCCP TRG CHIEF CMPLNT
Subjective   Ave Correia is a 29 y.o. female is here today for medication management follow-up.    Chief Complaint:  Recheck on behaviors and sleep    History of Present Illness: Patient presents with a caretaker and her .  They both state that patient continues to have problems with sleeping.  They state that all of her issues seem more behavioral.  When she gets upset this is when she has the outburst.  Says that also though they know at times that she is faking a seizure as she will look at them while she is acting like she is having 1.  She actually did this while in the office and I witnessed this.  Patient eyes were open and she followed with her eyes but yet she acted like she could not respond and this lasted for approximately 1 minute.  She did not fall over.  She sat up in the chair.  They state that she does go to day treatment and she is getting up at 2:59 AM and staying up and going to day treatment all day and they are keeping her awake there so when she gets home she is extremely tired around 5:00 and they have to really work at keeping her up until 7 PM.  They said if she is not allowed to go to sleep she will act out badly with screaming and some physical aggression.  The caregiver has scratch marks on her arm where patient supposedly scratched her and her  concurs with this.  She then goes to sleep and wakes up at 2:59 AM.  The Seroquel has not helped this.  They have tried to give her the Seroquel around 7 PM as they state that is the absolute latest she can stay awake.  No negative side effects to the meds.Body mass index is 24.6 kg/m². weight loss 3 lbs since last visit.      The following portions of the patient's history were reviewed and updated as appropriate: allergies, current medications, past family history, past medical history, past social history, past surgical history and problem list.    Review of Systems   Constitutional: Negative for activity change, appetite  "change and fatigue.   HENT: Negative.    Eyes: Negative for visual disturbance.   Respiratory: Negative.    Cardiovascular: Negative.    Gastrointestinal: Negative for nausea.   Endocrine: Negative.    Genitourinary: Negative.    Musculoskeletal: Positive for gait problem. Negative for arthralgias.   Skin: Negative.    Allergic/Immunologic: Negative.    Neurological: Positive for seizures and speech difficulty. Negative for dizziness and headaches.   Hematological: Negative.    Psychiatric/Behavioral: Positive for behavioral problems and sleep disturbance. Negative for agitation, confusion, decreased concentration, dysphoric mood, hallucinations, self-injury and suicidal ideas. The patient is not nervous/anxious and is not hyperactive.        Objective   Physical Exam  Vitals reviewed.   Constitutional:       Comments: She wears a helmet on her head for the drop seizures.   Musculoskeletal:      Cervical back: Normal range of motion.   Neurological:      Mental Status: She is alert.      Coordination: Coordination abnormal.      Gait: Gait abnormal.   Psychiatric:         Attention and Perception: She is inattentive.         Mood and Affect: Affect is blunt.         Behavior: Behavior is cooperative.         Cognition and Memory: Cognition is impaired.      Comments: Sits leaning forward but will sit up on command.  Face is asymmetrical. Smiles and answers simple questions.  Must be supported to walk.         Blood pressure 108/78, pulse 78, temperature 97.3 °F (36.3 °C), height 144.8 cm (57.01\"), weight 51.6 kg (113 lb 11 oz), SpO2 100 %.    Medication List:   Current Outpatient Medications   Medication Sig Dispense Refill   • acetaminophen (TYLENOL) 500 MG tablet Take 1,000 mg by mouth Every 8 (Eight) Hours As Needed for Mild Pain .     • ARIPiprazole (ABILIFY) 5 MG tablet Take 1 tablet by mouth Daily. 30 tablet 1   • bisacodyl (DULCOLAX) 5 MG EC tablet Take 5 mg by mouth Daily As Needed for Constipation.     • " citalopram (CeleXA) 20 MG tablet Take 1 tablet by mouth Daily. 30 tablet 1   • cloBAZam (ONFI) 10 MG tablet Take 35 mg by mouth Every Night.     • divalproex (DEPAKOTE ER) 250 MG 24 hr tablet Take 750 mg by mouth 2 (Two) Times a Day.     • ferrous sulfate 325 (65 FE) MG tablet Take 325 mg by mouth Daily With Breakfast.     • furosemide (LASIX) 20 MG tablet Take 1 tablet by mouth Daily As Needed (Edema). 30 tablet 2   • lactulose (CHRONULAC) 10 GM/15ML solution Take 20 g by mouth 2 (Two) Times a Day As Needed.     • levETIRAcetam (KEPPRA XR) 750 MG tablet sustained-release 24 hour tablet Take 1,500 mg by mouth 2 (Two) Times a Day.     • Levonorgest-Eth Estrad 91-Day 0.15-0.03 &0.01 MG tablet Take 1 tablet by mouth Daily.     • levothyroxine (SYNTHROID, LEVOTHROID) 75 MCG tablet Take 75 mcg by mouth Daily.     • multivitamin with minerals tablet tablet Take 1 tablet by mouth Daily.     • polyethylene glycol (MIRALAX) 17 g packet Take 17 g by mouth Daily.     • potassium chloride 10 MEQ CR tablet Take 1 tablet by mouth Daily As Needed (when taking Furosemide). 30 tablet 2   • QUEtiapine (SEROquel) 100 MG tablet Take 1 tablet by mouth Every Night. 30 tablet 1   • levothyroxine (SYNTHROID, LEVOTHROID) 75 MCG tablet 75 mcg.       No current facility-administered medications for this visit.       Mental Status Exam:   Hygiene:   good  Cooperation:  Cooperative  Eye Contact:  Poor  Psychomotor Behavior:  Slow  Affect:  Blunted  Hopelessness: Denies  Speech:  Minimal  Thought Process:  Unable to demonstrate  Thought Content:  Unable to demonstrate  Suicidal:  None  Homicidal:  None  Hallucinations:  None  Delusion:  None  Memory:  Unable to evaluate  Orientation:  Unable to evaluate  Reliability:  unable to evaluate  Insight:  Poor  Judgement:  Poor  Impulse Control:  Fair  Physical/Medical Issues:  Yes cerbral palsy., hypothyroidism, seizures    Assessment & Plan   Problems Addressed this Visit    None     Visit Diagnoses      Cerebral palsy, unspecified type (HCC)    -  Primary    Behavior disturbance        Relevant Medications    QUEtiapine (SEROquel) 100 MG tablet    citalopram (CeleXA) 20 MG tablet    ARIPiprazole (ABILIFY) 5 MG tablet    Seizure disorder (HCC)        Intellectual disability        Relevant Medications    QUEtiapine (SEROquel) 100 MG tablet    citalopram (CeleXA) 20 MG tablet    ARIPiprazole (ABILIFY) 5 MG tablet      Diagnoses       Codes Comments    Cerebral palsy, unspecified type (HCC)    -  Primary ICD-10-CM: G80.9  ICD-9-CM: 343.9     Behavior disturbance     ICD-10-CM: F91.9  ICD-9-CM: 312.9     Seizure disorder (HCC)     ICD-10-CM: G40.909  ICD-9-CM: 345.90     Intellectual disability     ICD-10-CM: F79  ICD-9-CM: 319           Functionality: pt having significant impairment in important areas of daily functioning.  Prognosis: Guarded dependent on medication/follow up and treatment plan compliance.    He had a lengthy discussion regarding patient's treatment plan.  I am not even sure how much the Abilify is helping patient since she came to me on this medicine and came to them on the medication as well.  For now I am keeping her on the Abilify for behavior issues, continuing the Celexa for intermittent explosive disorder I am assuming.  May try to decrease this at some point.  Right now I am trying to figure out what we can do for the sleep issue I am not changing anything.  I am increasing the Seroquel up to 100 mg and I instructed them to give this to her at 7 PM and going to see if this helps keep her asleep longer. Refills submitted.        Guardians are agreeable to call the Clinic with worsening symptoms.    Guardians are  aware to call 911 or go to the nearest ER should begin having SI/HI.              This document has been electronically signed by KATE Zhang on   July 20, 2022 12:47 EDT.       hypertension

## 2024-09-22 NOTE — ED ADULT NURSE NOTE - NSFALLHARMRISKINTERV_ED_ALL_ED
Assistance OOB with selected safe patient handling equipment if applicable/Assistance with ambulation/Communicate risk of Fall with Harm to all staff, patient, and family/Monitor gait and stability/Provide visual cue: red socks, yellow wristband, yellow gown, etc/Reinforce activity limits and safety measures with patient and family/Bed in lowest position, wheels locked, appropriate side rails in place/Call bell, personal items and telephone in reach/Instruct patient to call for assistance before getting out of bed/chair/stretcher/Non-slip footwear applied when patient is off stretcher/[place] to call system/Physically safe environment - no spills, clutter or unnecessary equipment/Purposeful Proactive Rounding/Room/bathroom lighting operational, light cord in reach

## 2024-09-22 NOTE — H&P ADULT - NSHPSOCIALHISTORY_GEN_ALL_CORE
former smoker <20 pack year, quit 1974 former smoker <20 pack year, quit 1974  , lives alone  ambulates without assist  retired

## 2024-09-22 NOTE — ED PROVIDER NOTE - PHYSICAL EXAMINATION
PHYSICAL EXAM:  GENERAL: Sitting comfortable in bed, in no acute distress  HENMT: Atraumatic, moist mucous membranes  EYES: Clear bilaterally, EOMs intact b/l  HEART: Regular rate and regular rhythm  RESPIRATORY: Clear to auscultation bilaterally, no wheezes/rhonchi/rales  ABDOMEN: Soft, nontender, nondistended  EXTREMITIES: No lower extremity edema  NEURO: Alert, follows commands, CN II-XII grossly intact, no focal motor deficits or sensory deficits, no dysmetria, no pronator drift, slightly unsteady gait  SKIN: Skin normal color for race, warm, dry

## 2024-09-22 NOTE — H&P ADULT - NSICDXPASTMEDICALHX_GEN_ALL_CORE_FT
PAST MEDICAL HISTORY:  2019 novel coronavirus disease (COVID-19) in 2020 March    History of TIAs     HTN (hypertension)     Pulmonary embolism     UTI (urinary tract infection), bacterial

## 2024-09-22 NOTE — ED ADULT NURSE NOTE - OBJECTIVE STATEMENT
87 yo female with pmh TIA, HTN, PE on eliquis presents to ED c/o dizziness and head pressure starting approx 1330. Pt reports she was at the foodstore when she "started feeling fuzzy," a/w head pressure and nausea. Pt reports symptoms similar to past TIA. Pt also endorsing "swaying" while walking. Pt dizziness and head pressure have since resolved. Pt denies confusion/AMS, vision changes, weakness, numbness/tingling, cp, sob, vomiting, fevers/chills, urinary symptoms. Pt a&ox3, breathing spontaneous and unlabored, borrego and following commands, sensation intact. Code stroke activated and pt brought to CT with RN, ED MD and neuro MD. Pt safety measures in place and comfort provided.

## 2024-09-22 NOTE — ED PROVIDER NOTE - ATTENDING CONTRIBUTION TO CARE
Attending Ruby: I performed a history and physical exam of the patient and discussed their management with the resident/fellow/student. I have reviewed the resident/fellow/student note and agree with the documented findings and plan of care, except as noted. I have personally performed a substantive portion of the visit including all aspects of the medical decision making. My medical decision making and observations are found below. Please refer to any progress notes for updates on clinical course. 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 Code stroke at triage. Daughter at bedside for collateral.  Patient was shopping and at 1:30 PM she started having dizziness, which she describes as an unsteadiness sensation and also felt like her gait was off balance.  At that time also had some nausea, which has resolved.  Denies fever, recent illness, chest pain, shortness of breath, abdominal pain, weakness, numbness, tingling. No falls or head trauma.     Gen: NAD, AOx3, able to make needs known, non-toxic  Head: NCAT  HEENT: EOMI, oral mucosa moist, normal conjunctiva  Lung: no respiratory distress, CTAB, no wheezes/rhonchi/rales B/L, speaking in full sentences  CV: RRR, no murmurs  Abd: non distended, soft, nontender, no guarding, no CVA tenderness  MSK: no visible deformities  Neuro: Appears non focal. sensation intact. mildly unsteady gait. finger to nose normal b/l  Skin: Warm, well perfused  Psych: normal affect     Pt overall no acute distress. Will follow up code stroke imaging and neuro recs. Possible peripheral vertigo, will treat symptomatically. Will eval for metabolic vs infectious abnormalities. Plan for labs, imaging, EKG, meds. Will reassess the need for additional interventions as clinically warranted. Refer to any progress notes for updates on clinical course and as a continuation of this MDM.     I, Dr. Livan Beltran, independently interpreted the EKG which showed NSR at a rate of 72.

## 2024-09-22 NOTE — H&P ADULT - ADDITIONAL PE
T(F): 98.2 (22 Sep 2024 20:21), Max: 98.2 (22 Sep 2024 20:21)  HR: 80 (22 Sep 2024 21:21) (68 - 81)  BP: 182/89 (22 Sep 2024 21:21) (182/89 - 208/95)  RR: 18 (22 Sep 2024 21:21) (16 - 18)  SpO2: 96% (22 Sep 2024 21:21) (95% - 99%): room air

## 2024-09-22 NOTE — H&P ADULT - PROBLEM SELECTOR PLAN 1
similar episode in similar episode in June, evaluated by neurology, TTE - EF 50% with a moderately dilated left atrium, MRI brain  R centrum semiovale infarct, when ASA 81mg was started.  Stroke protocol with negative CT imaging  - Neurology consult appreciated  - will check MRI brain w/o contrast to further evaluate to r/o TIA/CVA  - will continue ASA 81 and Eliquis 2.5mg bid  - will risk stratify with FLP, TSH and A1C

## 2024-09-22 NOTE — H&P ADULT - ASSESSMENT
88 year-old female with HTN, HLD, thrombocytosis with unprovoked PE in 2022 (on Eliquis), TIA (on asa), cholecystectomy, recent diverticulitis presents with acute dizziness and unsteadiness admitted to rule out TAIA/stroke

## 2024-09-22 NOTE — ED ADULT NURSE REASSESSMENT NOTE - NS ED NURSE REASSESS COMMENT FT1
On re-assessment pt currently appears comfortable resting in stretcher in hallway with appropriate side rails up for safety. pt is awake and alert, vital signs stable, breathing even and unlabored, NAD noted at this time. Plan of care discussed with pt. Pt pending neuro recs.

## 2024-09-23 DIAGNOSIS — I26.99 OTHER PULMONARY EMBOLISM WITHOUT ACUTE COR PULMONALE: ICD-10-CM

## 2024-09-23 DIAGNOSIS — I10 ESSENTIAL (PRIMARY) HYPERTENSION: ICD-10-CM

## 2024-09-23 DIAGNOSIS — Z29.9 ENCOUNTER FOR PROPHYLACTIC MEASURES, UNSPECIFIED: ICD-10-CM

## 2024-09-23 DIAGNOSIS — F41.9 ANXIETY DISORDER, UNSPECIFIED: ICD-10-CM

## 2024-09-23 DIAGNOSIS — E78.5 HYPERLIPIDEMIA, UNSPECIFIED: ICD-10-CM

## 2024-09-23 DIAGNOSIS — R42 DIZZINESS AND GIDDINESS: ICD-10-CM

## 2024-09-23 DIAGNOSIS — Z86.2 PERSONAL HISTORY OF DISEASES OF THE BLOOD AND BLOOD-FORMING ORGANS AND CERTAIN DISORDERS INVOLVING THE IMMUNE MECHANISM: ICD-10-CM

## 2024-09-23 LAB
A1C WITH ESTIMATED AVERAGE GLUCOSE RESULT: 5.6 % — SIGNIFICANT CHANGE UP (ref 4–5.6)
ANION GAP SERPL CALC-SCNC: 12 MMOL/L — SIGNIFICANT CHANGE UP (ref 5–17)
APPEARANCE UR: CLEAR — SIGNIFICANT CHANGE UP
BASOPHILS # BLD AUTO: 0.03 K/UL — SIGNIFICANT CHANGE UP (ref 0–0.2)
BASOPHILS NFR BLD AUTO: 0.5 % — SIGNIFICANT CHANGE UP (ref 0–2)
BILIRUB UR-MCNC: NEGATIVE — SIGNIFICANT CHANGE UP
BUN SERPL-MCNC: 11 MG/DL — SIGNIFICANT CHANGE UP (ref 7–23)
CALCIUM SERPL-MCNC: 10 MG/DL — SIGNIFICANT CHANGE UP (ref 8.4–10.5)
CHLORIDE SERPL-SCNC: 101 MMOL/L — SIGNIFICANT CHANGE UP (ref 96–108)
CHOLEST SERPL-MCNC: 129 MG/DL — SIGNIFICANT CHANGE UP
CO2 SERPL-SCNC: 25 MMOL/L — SIGNIFICANT CHANGE UP (ref 22–31)
COLOR SPEC: YELLOW — SIGNIFICANT CHANGE UP
CREAT SERPL-MCNC: 0.61 MG/DL — SIGNIFICANT CHANGE UP (ref 0.5–1.3)
DIFF PNL FLD: NEGATIVE — SIGNIFICANT CHANGE UP
EGFR: 87 ML/MIN/1.73M2 — SIGNIFICANT CHANGE UP
EOSINOPHIL # BLD AUTO: 0.13 K/UL — SIGNIFICANT CHANGE UP (ref 0–0.5)
EOSINOPHIL NFR BLD AUTO: 2.3 % — SIGNIFICANT CHANGE UP (ref 0–6)
ESTIMATED AVERAGE GLUCOSE: 114 MG/DL — SIGNIFICANT CHANGE UP (ref 68–114)
GLUCOSE SERPL-MCNC: 68 MG/DL — LOW (ref 70–99)
GLUCOSE UR QL: NEGATIVE MG/DL — SIGNIFICANT CHANGE UP
HCT VFR BLD CALC: 39.6 % — SIGNIFICANT CHANGE UP (ref 34.5–45)
HDLC SERPL-MCNC: 51 MG/DL — SIGNIFICANT CHANGE UP
HGB BLD-MCNC: 12.8 G/DL — SIGNIFICANT CHANGE UP (ref 11.5–15.5)
IMM GRANULOCYTES NFR BLD AUTO: 0.2 % — SIGNIFICANT CHANGE UP (ref 0–0.9)
KETONES UR-MCNC: NEGATIVE MG/DL — SIGNIFICANT CHANGE UP
LEUKOCYTE ESTERASE UR-ACNC: NEGATIVE — SIGNIFICANT CHANGE UP
LIPID PNL WITH DIRECT LDL SERPL: 68 MG/DL — SIGNIFICANT CHANGE UP
LYMPHOCYTES # BLD AUTO: 1.3 K/UL — SIGNIFICANT CHANGE UP (ref 1–3.3)
LYMPHOCYTES # BLD AUTO: 22.6 % — SIGNIFICANT CHANGE UP (ref 13–44)
MCHC RBC-ENTMCNC: 29.6 PG — SIGNIFICANT CHANGE UP (ref 27–34)
MCHC RBC-ENTMCNC: 32.3 GM/DL — SIGNIFICANT CHANGE UP (ref 32–36)
MCV RBC AUTO: 91.7 FL — SIGNIFICANT CHANGE UP (ref 80–100)
MONOCYTES # BLD AUTO: 0.67 K/UL — SIGNIFICANT CHANGE UP (ref 0–0.9)
MONOCYTES NFR BLD AUTO: 11.7 % — SIGNIFICANT CHANGE UP (ref 2–14)
NEUTROPHILS # BLD AUTO: 3.6 K/UL — SIGNIFICANT CHANGE UP (ref 1.8–7.4)
NEUTROPHILS NFR BLD AUTO: 62.7 % — SIGNIFICANT CHANGE UP (ref 43–77)
NITRITE UR-MCNC: NEGATIVE — SIGNIFICANT CHANGE UP
NON HDL CHOLESTEROL: 78 MG/DL — SIGNIFICANT CHANGE UP
NRBC # BLD: 0 /100 WBCS — SIGNIFICANT CHANGE UP (ref 0–0)
PH UR: 7 — SIGNIFICANT CHANGE UP (ref 5–8)
PLATELET # BLD AUTO: 632 K/UL — HIGH (ref 150–400)
POTASSIUM SERPL-MCNC: 3.5 MMOL/L — SIGNIFICANT CHANGE UP (ref 3.5–5.3)
POTASSIUM SERPL-SCNC: 3.5 MMOL/L — SIGNIFICANT CHANGE UP (ref 3.5–5.3)
PROT UR-MCNC: NEGATIVE MG/DL — SIGNIFICANT CHANGE UP
RBC # BLD: 4.32 M/UL — SIGNIFICANT CHANGE UP (ref 3.8–5.2)
RBC # FLD: 15.4 % — HIGH (ref 10.3–14.5)
SODIUM SERPL-SCNC: 138 MMOL/L — SIGNIFICANT CHANGE UP (ref 135–145)
SP GR SPEC: 1.03 — SIGNIFICANT CHANGE UP (ref 1–1.03)
TRIGL SERPL-MCNC: 45 MG/DL — SIGNIFICANT CHANGE UP
TSH SERPL-MCNC: 3.24 UIU/ML — SIGNIFICANT CHANGE UP (ref 0.27–4.2)
UROBILINOGEN FLD QL: 0.2 MG/DL — SIGNIFICANT CHANGE UP (ref 0.2–1)
WBC # BLD: 5.74 K/UL — SIGNIFICANT CHANGE UP (ref 3.8–10.5)
WBC # FLD AUTO: 5.74 K/UL — SIGNIFICANT CHANGE UP (ref 3.8–10.5)

## 2024-09-23 PROCEDURE — 70551 MRI BRAIN STEM W/O DYE: CPT | Mod: 26

## 2024-09-23 RX ORDER — ATENOLOL 25 MG/1
12.5 TABLET ORAL DAILY
Refills: 0 | Status: DISCONTINUED | OUTPATIENT
Start: 2024-09-23 | End: 2024-09-27

## 2024-09-23 RX ORDER — ENALAPRIL MALEATE 5 MG
2.5 TABLET ORAL
Refills: 0 | Status: DISCONTINUED | OUTPATIENT
Start: 2024-09-23 | End: 2024-09-27

## 2024-09-23 RX ORDER — ESCITALOPRAM OXALATE 10 MG
15 TABLET ORAL DAILY
Refills: 0 | Status: DISCONTINUED | OUTPATIENT
Start: 2024-09-23 | End: 2024-09-27

## 2024-09-23 RX ORDER — ASPIRIN 325 MG
81 TABLET ORAL DAILY
Refills: 0 | Status: DISCONTINUED | OUTPATIENT
Start: 2024-09-23 | End: 2024-09-27

## 2024-09-23 RX ORDER — APIXABAN 5 MG/1
2.5 TABLET, FILM COATED ORAL EVERY 12 HOURS
Refills: 0 | Status: DISCONTINUED | OUTPATIENT
Start: 2024-09-23 | End: 2024-09-27

## 2024-09-23 RX ORDER — ATORVASTATIN CALCIUM 10 MG/1
40 TABLET, FILM COATED ORAL AT BEDTIME
Refills: 0 | Status: DISCONTINUED | OUTPATIENT
Start: 2024-09-23 | End: 2024-09-27

## 2024-09-23 RX ORDER — ENALAPRIL MALEATE 5 MG
5 TABLET ORAL
Refills: 0 | Status: DISCONTINUED | OUTPATIENT
Start: 2024-09-23 | End: 2024-09-27

## 2024-09-23 RX ORDER — ENALAPRIL MALEATE 5 MG
5 TABLET ORAL ONCE
Refills: 0 | Status: COMPLETED | OUTPATIENT
Start: 2024-09-23 | End: 2024-09-23

## 2024-09-23 RX ADMIN — Medication 15 MILLIGRAM(S): at 11:24

## 2024-09-23 RX ADMIN — ATENOLOL 12.5 MILLIGRAM(S): 25 TABLET ORAL at 05:36

## 2024-09-23 RX ADMIN — Medication 5 MILLIGRAM(S): at 01:56

## 2024-09-23 RX ADMIN — Medication 5 MILLIGRAM(S): at 17:37

## 2024-09-23 RX ADMIN — APIXABAN 2.5 MILLIGRAM(S): 5 TABLET, FILM COATED ORAL at 05:36

## 2024-09-23 RX ADMIN — APIXABAN 2.5 MILLIGRAM(S): 5 TABLET, FILM COATED ORAL at 17:38

## 2024-09-23 RX ADMIN — Medication 81 MILLIGRAM(S): at 11:24

## 2024-09-23 RX ADMIN — ATORVASTATIN CALCIUM 40 MILLIGRAM(S): 10 TABLET, FILM COATED ORAL at 21:23

## 2024-09-23 RX ADMIN — Medication 2.5 MILLIGRAM(S): at 05:35

## 2024-09-23 NOTE — PHYSICAL THERAPY INITIAL EVALUATION ADULT - PERTINENT HX OF CURRENT PROBLEM, REHAB EVAL
88 y/oF admitted , presenting as code stroke for dizziness and unsteady gait. As per H&P,  was shopping and at 1:30 PM when she started having dizziness with associated nausea.  She held on the shopping cart due to unsteadiness, drove home, felt like she was going to pass out (but no LOC), laid down on the couch until daughter arrived to bring her to ED. Symptoms now completely resolved without taking Meclizine or Reglan ED ordered. As per H&P, similar episode in , evaluated by neurology, TTE - EF 50% with a moderately dilated left atrium, MRI brain  R centrum semiovale infarct, when ASA 81mg was started. As per neurology consult, pt reports she is very active and independent at baseline. Per daughter she has some baseline gait unsteadiness and "sometimes falls to the side". Denies recent falls. Daughter also mentioned that patient has had a "very stressful" last 2 days, including attending her brother-in-laws  and spending all day out of the house. Patient reports that she checks her blood pressure every day, and this morning it was 110s/80s. As per assessment,  No focal neurologic deficits, +Romberg, falls to the left during ambulation (per daughter this is patient's baseline).  Stroke imaging was unremarkable. Impression- Episode of lightheadedness and nausea which seems more consistent with presyncopal episode, which may have been brought on by stress or dehydration. Acute stroke vs TIA cannot be excluded. MRI pending, Check orthostatics. BP in /95. PMH HTN, HLD, thrombocytosis with unprovoked PE in  (on Eliquis), TIA (on asa), cholecystectomy, recent diverticulitis 88 y/oF admitted , presenting as code stroke for dizziness and unsteady gait. As per H&P,  was shopping and at 1:30 PM when she started having dizziness with associated nausea.  She held on the shopping cart due to unsteadiness, drove home, felt like she was going to pass out (but no LOC), laid down on the couch until daughter arrived to bring her to ED. Symptoms now completely resolved without taking Meclizine or Reglan ED ordered. As per H&P, similar episode in , evaluated by neurology, TTE - EF 50% with a moderately dilated left atrium, MRI brain  R centrum semiovale infarct, when ASA 81mg was started. As per neurology consult, pt reports she is very active and independent at baseline. Per daughter she has some baseline gait unsteadiness and "sometimes falls to the side". Denies recent falls. Daughter also mentioned that patient has had a "very stressful" last 2 days, including attending her brother-in-laws  and spending all day out of the house. Patient reports that she checks her blood pressure every day, and this morning it was 110s/80s. As per assessment,  No focal neurologic deficits, +Romberg, falls to the left during ambulation (per daughter this is patient's baseline).  Stroke imaging was unremarkable. Impression- Episode of lightheadedness and nausea which seems more consistent with presyncopal episode, which may have been brought on by stress or dehydration. Acute stroke vs TIA cannot be excluded. MRI pending, Check orthostatics. BP in /95. PMH HTN, HLD, thrombocytosis with unprovoked PE in  (on Eliquis), TIA (on asa), cholecystectomy, recent diverticulitis. CT BRAIN : No acute intracranial hemorrhage, large acute territorial infarct, or mass effect. CT NECK : (-). MRI HEAD : No evidence of acute ischemia. Focus of diffusion restriction in the right centrum semiovale has normally evolved since 2024. No acute hemorrhage, mass, or brain edema. Mild age-related cerebral volume loss and ischemic gliotic changes, similar to prior exam on 2024.

## 2024-09-23 NOTE — CONSULT NOTE ADULT - SUBJECTIVE AND OBJECTIVE BOX
C A R D I O L O G Y  *********************    DATE OF SERVICE: 09-23-24    HISTORY OF PRESENT ILLNESS: HPI:  Patient is an 88 year-old female with PMH of HTN, HLD, thrombocytosis with unprovoked PE in 2022 (on Eliquis), TIA (on asa), cholecystectomy, recent diverticulitis who presented as a code stroke for dizziness and unsteady gait. MRI negative for acute CVA. Cardiology consulted for further evaluation. Patient was shopping and started having dizziness with associated nausea.  She held on the shopping cart due to unsteadiness, drove home, felt like she was going to pass out (but no LOC), laid down on the couch until daughter arrived to bring her to ED. Symptoms now resolved. Denies chest pain, SOB, palpitations, or syncope.    PAST MEDICAL & SURGICAL HISTORY:  HTN (hypertension)      UTI (urinary tract infection), bacterial      2019 novel coronavirus disease (COVID-19)  in 2020 March      History of TIAs      Pulmonary embolism      History of cholecystectomy      H/O lysis of adhesions              MEDICATIONS:  MEDICATIONS  (STANDING):  apixaban 2.5 milliGRAM(s) Oral every 12 hours  aspirin enteric coated 81 milliGRAM(s) Oral daily  atenolol  Tablet 12.5 milliGRAM(s) Oral daily  atorvastatin 40 milliGRAM(s) Oral at bedtime  enalapril 2.5 milliGRAM(s) Oral <User Schedule>  enalapril 5 milliGRAM(s) Oral <User Schedule>  escitalopram 15 milliGRAM(s) Oral daily      Allergies    penicillin (Unknown)    Intolerances        FAMILY HISTORY:  No pertinent family history in first degree relatives      Non-contributary for premature coronary disease or sudden cardiac death    SOCIAL HISTORY:    [x ] Non-smoker  [ ] Smoker  [ ] Alcohol    FLU VACCINE THIS YEAR STARTS IN AUGUST:  [ ] Yes    [ ] No    IF OVER 65 HAVE YOU EVER HAD A PNA VACCINE:  [ ] Yes    [ ] No       [ ] N/A      REVIEW OF SYSTEMS:  [ ]chest pain  [  ]shortness of breath  [  ]palpitations  [  ]syncope  [x ]near syncope [ ]upper extremity weakness   [ ] lower extremity weakness  [  ]diplopia  [  ]altered mental status   [  ]fevers  [ ]chills [x ]nausea  [ ]vomiting  [  ]dysphagia    [ ]abdominal pain  [ ]melena  [ ]BRBPR    [  ]epistaxis  [  ]rash    [ ]lower extremity edema        [X] All others negative	  [ ] Unable to obtain      LABS:	 	    CARDIAC MARKERS:                              12.8   5.74  )-----------( 632      ( 23 Sep 2024 07:39 )             39.6     Hb Trend: 12.8<--, 13.6<--    09-23    138  |  101  |  11  ----------------------------<  68[L]  3.5   |  25  |  0.61    Ca    10.0      23 Sep 2024 07:39    TPro  8.1  /  Alb  4.7  /  TBili  0.6  /  DBili  x   /  AST  36  /  ALT  40  /  AlkPhos  91  09-22    Creatinine Trend: 0.61<--, 0.57<--, 0.67<--, 0.83<--    Coags:      proBNP:   Lipid Profile:   HgA1c:   TSH: Thyroid Stimulating Hormone, Serum: 3.24 uIU/mL (09-23 @ 07:39)          PHYSICAL EXAM:  T(C): 36.6 (09-23-24 @ 13:32), Max: 36.8 (09-22-24 @ 20:21)  HR: 65 (09-23-24 @ 13:32) (60 - 81)  BP: 103/58 (09-23-24 @ 13:32) (103/58 - 208/95)  RR: 18 (09-23-24 @ 13:32) (16 - 18)  SpO2: 95% (09-23-24 @ 13:32) (95% - 99%)  Wt(kg): --   BMI (kg/m2): 19.8 (09-22-24 @ 16:30)  I&O's Summary    22 Sep 2024 07:01  -  23 Sep 2024 07:00  --------------------------------------------------------  IN: 250 mL / OUT: 0 mL / NET: 250 mL    23 Sep 2024 07:01  -  23 Sep 2024 16:03  --------------------------------------------------------  IN: 250 mL / OUT: 0 mL / NET: 250 mL        Gen: NAD  HEENT:  (-)icterus (-)pallor  CV: N S1 S2 1/6 KARIS (+)2 Pulses B/l  Resp:  Clear to auscultation B/L, normal effort  GI: (+) BS Soft, NT, ND  Lymph:  (-)Edema, (-)obvious lymphadenopathy  Skin: Warm to touch, Normal turgor  Psych: Appropriate mood and affect      TELEMETRY: SB/SR 50-60s	      ECG: NSR, LVH with repolarization abnormality - unchanged compared to prior EKGs 	    RADIOLOGY:  < from: MR Head No Cont (09.23.24 @ 10:16) >    IMPRESSION:    No evidence of acute ischemia. Focus of diffusion restriction in the   right centrum semiovale has normally evolved since 6/18/2024.  No acute hemorrhage, mass, or brain edema.  Mild age-related cerebral volume loss and ischemic gliotic changes,   similar to prior exam on 6/18/2024.    --- End of Report ---    < end of copied text >      ASSESSMENT/PLAN: Patient is an 88 year-old female with PMH of HTN, HLD, thrombocytosis with unprovoked PE in 2022 (on Eliquis), TIA (on asa), cholecystectomy, recent diverticulitis who presented as a code stroke for dizziness and unsteady gait. MRI negative for acute CVA. Cardiology consulted for further evaluation.    #Near Syncope  - MRI neg for acute CVA  - Neuro follow up  - Orthostatics negative  - Possibly related to SBP 200s upon arrival  - TSH WNL  - Recent TTE with normal LV function, no sig valvular disease. No need to repeat  - Recent Carotid dopplers with no sig stenosis  - Prior CT Cors in 2022 with minimal nonobstructive CAD  - Monitor telemetry. EKG with NSR, narrow QRS  - EP consult with Dr. Meza called for ILR eval given recurrent symptoms    #HTN  - Trend BP, now improved  - Continue Enalapril 2.5mg AM and 5mg PM  - Continue Atenolol 12.5mg daily    #Hx PE  - Continue Eliquis per med    - Patient to f/u with her cardiologist Dr. Robert Loomis after discharge    Felix Moreno PA-C  Pager: [phone]

## 2024-09-23 NOTE — PHYSICAL THERAPY INITIAL EVALUATION ADULT - ACTIVE RANGE OF MOTION EXAMINATION, REHAB EVAL
frank. upper extremity Active ROM was WNL (within normal limits)/bilateral lower extremity Active ROM was WNL (within normal limits)

## 2024-09-23 NOTE — PHYSICAL THERAPY INITIAL EVALUATION ADULT - ADDITIONAL COMMENTS
Pt lives in apartment alone w/ no steps to enter and first floor setup. Pt independent w/ ambulation and ADL's PTA. Pt does not use or own DME.

## 2024-09-24 DIAGNOSIS — U07.1 COVID-19: ICD-10-CM

## 2024-09-24 LAB
FLUAV AG NPH QL: SIGNIFICANT CHANGE UP
FLUBV AG NPH QL: SIGNIFICANT CHANGE UP
RAPID RVP RESULT: DETECTED
RSV RNA NPH QL NAA+NON-PROBE: SIGNIFICANT CHANGE UP
SARS-COV-2 RNA SPEC QL NAA+PROBE: DETECTED

## 2024-09-24 RX ORDER — BENZONATATE 150 MG/1
100 CAPSULE ORAL ONCE
Refills: 0 | Status: COMPLETED | OUTPATIENT
Start: 2024-09-24 | End: 2024-09-24

## 2024-09-24 RX ORDER — BENZOCAINE AND LEVOMENTHOL 200; 5 MG/G; MG/G
1 SPRAY TOPICAL ONCE
Refills: 0 | Status: COMPLETED | OUTPATIENT
Start: 2024-09-24 | End: 2024-09-24

## 2024-09-24 RX ORDER — REMDESIVIR 5 MG/ML
INJECTION INTRAVENOUS
Refills: 0 | Status: COMPLETED | OUTPATIENT
Start: 2024-09-24 | End: 2024-09-26

## 2024-09-24 RX ORDER — REMDESIVIR 5 MG/ML
100 INJECTION INTRAVENOUS EVERY 24 HOURS
Refills: 0 | Status: COMPLETED | OUTPATIENT
Start: 2024-09-25 | End: 2024-09-26

## 2024-09-24 RX ORDER — REMDESIVIR 5 MG/ML
200 INJECTION INTRAVENOUS EVERY 24 HOURS
Refills: 0 | Status: COMPLETED | OUTPATIENT
Start: 2024-09-24 | End: 2024-09-24

## 2024-09-24 RX ORDER — INFLUENZA VIRUS VACCINE 15; 15; 15; 15 UG/.5ML; UG/.5ML; UG/.5ML; UG/.5ML
0.5 SUSPENSION INTRAMUSCULAR ONCE
Refills: 0 | Status: DISCONTINUED | OUTPATIENT
Start: 2024-09-24 | End: 2024-09-27

## 2024-09-24 RX ADMIN — Medication 81 MILLIGRAM(S): at 11:27

## 2024-09-24 RX ADMIN — BENZOCAINE AND LEVOMENTHOL 1 LOZENGE: 200; 5 SPRAY TOPICAL at 06:36

## 2024-09-24 RX ADMIN — BENZONATATE 100 MILLIGRAM(S): 150 CAPSULE ORAL at 21:47

## 2024-09-24 RX ADMIN — Medication 15 MILLIGRAM(S): at 11:27

## 2024-09-24 RX ADMIN — ATORVASTATIN CALCIUM 40 MILLIGRAM(S): 10 TABLET, FILM COATED ORAL at 21:02

## 2024-09-24 RX ADMIN — Medication 5 MILLIGRAM(S): at 17:42

## 2024-09-24 RX ADMIN — APIXABAN 2.5 MILLIGRAM(S): 5 TABLET, FILM COATED ORAL at 05:21

## 2024-09-24 RX ADMIN — REMDESIVIR 200 MILLIGRAM(S): 5 INJECTION INTRAVENOUS at 20:16

## 2024-09-24 RX ADMIN — APIXABAN 2.5 MILLIGRAM(S): 5 TABLET, FILM COATED ORAL at 17:39

## 2024-09-24 RX ADMIN — Medication 2.5 MILLIGRAM(S): at 05:20

## 2024-09-24 RX ADMIN — ATENOLOL 12.5 MILLIGRAM(S): 25 TABLET ORAL at 05:21

## 2024-09-24 NOTE — PATIENT PROFILE ADULT - FALL HARM RISK - HARM RISK INTERVENTIONS
Assistance with ambulation/Assistance OOB with selected safe patient handling equipment/Communicate Risk of Fall with Harm to all staff/Discuss with provider need for PT consult/Monitor gait and stability/Reinforce activity limits and safety measures with patient and family/Tailored Fall Risk Interventions/Visual Cue: Yellow wristband and red socks/Bed in lowest position, wheels locked, appropriate side rails in place/Call bell, personal items and telephone in reach/Instruct patient to call for assistance before getting out of bed or chair/Non-slip footwear when patient is out of bed/[place] to call system/Physically safe environment - no spills, clutter or unnecessary equipment/Purposeful Proactive Rounding/Room/bathroom lighting operational, light cord in reach

## 2024-09-24 NOTE — PATIENT PROFILE ADULT - FUNCTIONAL SCREEN CURRENT LEVEL: COMMUNICATION, MLM
[facility] Hematology/Oncology  PROGRESS NOTE - Follow-up Visit      Subjective:       Patient ID:   NAME: Conrad Kuhn : 1937     85 y.o. male    Referring Doc: Julien  Other Physicians: Ced Erazo Joubert, Srinivas, Pinsky    Chief Complaint:  CLL f/u    History of Present Illness:     Patient is being seen today in person in clinic for a regular follow-up viasit. He is here by himself.  The patient is on today to go over the results of the recently ordered labs, tests and studies. He is here with his daughter today    He has since completed the Gazyva per Dr Don's direction  and has just continued on venetoclax oral meds; he is also now off the allopurinol    He feels a little fatigued; swallowing is improved overall; some constipation; he saw Dr Hyman since last visit for esophageal dilation and is swallowing much better    He is breathing ok currently. He denies any CP, SOB, or N/V.     He saw Dr Don couple of weeks ago at [facility]; he sees him again in couple of months              Discussed Covid19 precautions; he had his vaccinations              ROS:   GEN: normal without any fever, night sweats or weight loss  HEENT: normal with no HA's, sore throat, stiff neck, changes in vision; LAD much better; swallowing better  CV: normal with no CP, SOB, PND, MAYER or orthopnea  PULM: normal with no SOB, cough, hemoptysis, sputum or pleuritic pain  GI: normal with no abdominal pain, nausea, vomiting, constipation, diarrhea, melanotic stools, BRBPR, or hematemesis; no dysphagia; peg tube removed  : urine frequency fine  BREAST: normal with no mass, discharge, pain  SKIN: normal with no rash, erythema, bruising, or swelling    Allergies:  Review of patient's allergies indicates:  No Known Allergies    Medications:    Current Outpatient Medications:     atorvastatin (LIPITOR) 20 MG tablet, Take 20 mg by mouth once daily., Disp: , Rfl:     azelastine (ASTELIN) 137 mcg (0.1 %) nasal spray, 1 spray by Nasal  route 2 (two) times daily. , Disp: , Rfl:     blood sugar diagnostic Strp, 1 strip by Misc.(Non-Drug; Combo Route) route 2 (two) times a day., Disp: , Rfl:     duke's soln (benadryl 30 mL, mylanta 30 mL, LIDOcaine 30 mL, nystatin 30 mL) 120mL, Take 10 mLs by mouth 4 (four) times daily., Disp: 240 mL, Rfl: 0    ferrous sulfate (FEOSOL) 325 mg (65 mg iron) Tab tablet, Take 325 mg by mouth daily with breakfast., Disp: , Rfl:     fluticasone propionate (FLONASE) 50 mcg/actuation nasal spray, 1 spray by Each Nostril route once daily., Disp: , Rfl:     gabapentin (NEURONTIN) 600 MG tablet, Take 600 mg by mouth 2 (two) times daily., Disp: , Rfl:     glimepiride (AMARYL) 2 MG tablet, Take 2 mg by mouth once daily at 6am., Disp: , Rfl:     HYDROcodone-acetaminophen (NORCO)  mg per tablet, Take 1 tablet by mouth every 8 (eight) hours as needed., Disp: , Rfl:     levalbuterol (XOPENEX) 1.25 mg/3 mL nebulizer solution, Take 3 mLs by nebulization every 4 to 6 hours as needed. , Disp: , Rfl:     LIDOCAINE VISCOUS 2 % solution, Take 15 mLs by mouth every 4 (four) hours as needed., Disp: , Rfl:     ondansetron (ZOFRAN) 8 MG tablet, Take 1 tablet (8 mg total) by mouth every 8 (eight) hours as needed for Nausea., Disp: 30 tablet, Rfl: 2    promethazine (PHENERGAN) 12.5 MG Tab, Take 1 tablet (12.5 mg total) by mouth every 6 (six) hours as needed., Disp: 30 tablet, Rfl: 3    tamsulosin (FLOMAX) 0.4 mg Cap, Take 1 capsule (0.4 mg total) by mouth once daily., Disp: 90 capsule, Rfl: 0    traZODone (DESYREL) 300 MG tablet, Take 300 mg by mouth every evening., Disp: , Rfl:     valACYclovir (VALTREX) 1000 MG tablet, Take 1,000 mg by mouth once daily., Disp: , Rfl:     venetoclax (VENCLEXTA) 100 mg Tab, Take 2 tablets (200 mg) by mouth once daily., Disp: 60 tablet, Rfl: 11    blood-glucose meter kit, Use as instructed, Disp: , Rfl:     losartan (COZAAR) 50 MG tablet, Take 1 tablet (50 mg total) by mouth once daily., Disp: 90 tablet,  "Rfl: 0    PMHx/PSHx Updates:  See patient's last visit with me on 7/5/2023  See H&P on 1/3/2019        Pathology:  Cancer Staging  No matching staging information was found for the patient.          Objective:     Vitals:  Blood pressure 130/67, pulse 80, temperature 97.5 °F (36.4 °C), resp. rate 18, height 6' 2" (1.88 m), weight 89.1 kg (196 lb 8 oz).        Physical Examination:   GEN: no apparent distress, comfortable; AAOx3  HEAD: atraumatic and normocephalic  EYES: no conjunctival pallor or muddiness, no icterus; normal pupil reaction to ambient light  ENT: OMM, no pharyngeal erythema, external bilateral ears WNL; no visible thrush or ulcers  NECK: no masses or swelling, trachea midline, no visible LAD/LN's ; LAD and LN's much better  CV: no palpitations; no pedal edema; no noticeable JVD or neck vein distension  CHEST: Normal respiratory effort; chest wall breath movements symmetrical; no audible wheezing  ABDOM: non-distended; no bloating;  peg tube since removed  MUSC/Skeletal: ROM normal; joints visibly normal; no deformities or arthropathy  EXTREM: no clubbing, cyanosis, inflammation or swelling; right arm with fair ROM  SKIN: no rashes, lesions, ulcers, petechiae or subcutaneous nodules  : no keith  NEURO: moving all 4 extremities; AAOx3; no tremors  PSYCH: normal mood, affect and behavior  LYMPH: no visible LN's or LAD; LAD and LN's on right neck reduced in size              Labs:   Lab Results   Component Value Date    WBC 1.76 (LL) 08/29/2023    HGB 12.0 (L) 08/29/2023    HCT 34.1 (L) 08/29/2023    MCV 90 08/29/2023    PLT 78 (L) 08/29/2023     CMP  Sodium   Date Value Ref Range Status   08/29/2023 139 136 - 145 mmol/L Final   06/12/2019 138 134 - 144 mmol/L      Potassium   Date Value Ref Range Status   08/29/2023 4.4 3.5 - 5.1 mmol/L Final     Chloride   Date Value Ref Range Status   08/29/2023 106 95 - 110 mmol/L Final   06/12/2019 99 98 - 110 mmol/L      CO2   Date Value Ref Range Status "   08/29/2023 29 23 - 29 mmol/L Final     Glucose   Date Value Ref Range Status   08/29/2023 139 (H) 70 - 110 mg/dL Final   06/12/2019 179 (H) 70 - 99 mg/dL      BUN   Date Value Ref Range Status   08/29/2023 22 8 - 23 mg/dL Final     Creatinine   Date Value Ref Range Status   08/29/2023 1.1 0.5 - 1.4 mg/dL Final   06/12/2019 0.95 0.60 - 1.40 mg/dL      Calcium   Date Value Ref Range Status   08/29/2023 9.1 8.7 - 10.5 mg/dL Final     Total Protein   Date Value Ref Range Status   08/29/2023 6.4 6.0 - 8.4 g/dL Final     Albumin   Date Value Ref Range Status   08/29/2023 4.1 3.5 - 5.2 g/dL Final   06/12/2019 3.9 3.1 - 4.7 g/dL      Total Bilirubin   Date Value Ref Range Status   08/29/2023 0.9 0.1 - 1.0 mg/dL Final     Comment:     For infants and newborns, interpretation of results should be based  on gestational age, weight and in agreement with clinical  observations.    Premature Infant recommended reference ranges:  Up to 24 hours.............<8.0 mg/dL  Up to 48 hours............<12.0 mg/dL  3-5 days..................<15.0 mg/dL  6-29 days.................<15.0 mg/dL       Alkaline Phosphatase   Date Value Ref Range Status   08/29/2023 54 (L) 55 - 135 U/L Final     AST   Date Value Ref Range Status   08/29/2023 16 10 - 40 U/L Final     ALT   Date Value Ref Range Status   08/29/2023 17 10 - 44 U/L Final     Anion Gap   Date Value Ref Range Status   08/29/2023 4 (L) 8 - 16 mmol/L Final     eGFR if    Date Value Ref Range Status   07/02/2022 >60.0 >60 mL/min/1.73 m^2 Final     eGFR    Date Value Ref Range Status   07/03/2022 97 >89 mL/min Final     eGFR if non    Date Value Ref Range Status   07/02/2022 >60.0 >60 mL/min/1.73 m^2 Final     Comment:     Calculation used to obtain the estimated glomerular filtration  rate (eGFR) is the CKD-EPI equation.              Lab Results   Component Value Date    IRON 59 02/02/2023    TIBC 342 02/02/2023    FERRITIN 67 02/02/2023          Radiology/Diagnostic Studies:    PET  12/2/2022:    IMPRESSION: Mild enlargement of the extensive adenopathy within the neck, chest, abdomen and pelvis with faint increased FDG activity     Stable splenomegaly         CT 11/8/2022:  IMPRESSION:     Worsening splenomegaly with diffuse increase in size and number of essentially all of the main lymph node chains throughout the chest, abdomen and pelvis.           PET 8/29/2022:  IMPRESSION:  1. Numerous enlarged lymph nodes throughout the neck, chest, abdomen and pelvis are non hypermetabolic, and stable compared to CT of 07/02/2022.  2. Stable splenomegaly, with no abnormal focal splenic FDG hypermetabolism.  3. Additional observations as described.         CT 7/2/2022:    IMPRESSION:  1. Interval worsening in numerous enlarged lymph nodes in the chest, abdomen and pelvis, as well as development of splenomegaly, compatible with lymphoma and or worsening CLL, given patient's history  2. Mild bilateral hydroureteronephrosis, with markedly enlarged prostate gland and appearance of the urinary bladder suggesting chronic bladder outlet obstruction. Consider correlation with urinalysis.  3. Infrarenal abdominal aortic aneurysm measuring 33 mm in diameter. Follow-up imaging in 3 years is recommended per best practice guidelines.  4. Additional observations as described.           PET  3/14/2022:    IMPRESSION:  Left upper lobectomy without evidence of recurrent or metastatic disease      MRI cervical spine  10/1/2021:    IMPRESSION:     Loss of normal cervical lordosis with mild kyphotic curvature.     Advanced multilevel cervical spondylosis, as outlined above. Spinal canal narrowing and foraminal narrowing is most severe at C5-6. 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     Congenital narrowing of cervical spinal canal, which exacerbates cervical spondylosis        CT head  8/27/2021:  IMPRESSION:     No  CT evidence of acute intracranial pathology.     Stable senescent changes as above.         PET  7/19/2021:    IMPRESSION:  Prior left upper lobectomy without evidence of recurrent or metastatic disease  - 3 cm infrarenal abdominal aortic aneurysm      CT head 6/29/2021:  IMPRESSION:     No CT evidence of acute intracranial pathology      PET  1/18/2021:    Impression:     No evidence of FDG avid lymphoproliferative disease.     Aneurysmal dilation of infrarenal abdominal aorta (3.4 cm) as well as aneurysmal dilation of right common iliac artery.     Additional incidental findings as above        PET  6/24/2020:    Impression:     1. No findings of active lymphoproliferative disease.  2. Additional observations as above.      PET  1/24/2020:    Impression       Moderate decrease in size of the adenopathy within the neck, chest, abdomen and pelvis.  The spleen is smaller in size.  There is no significant FDG activity.    Mild aneurysm dilatation of the infrarenal abdominal aorta           CT abdom/pelvis 8/7/2019:        Impression       1. Multifocal lymphadenopathy in the chest, abdomen, and pelvis compatible with provided clinical history of lymphocytic leukemia.  There is mixed interval change when compared to prior studies.  Pathologic lymphadenopathy in the chest has increased significantly when compared to a CTA of the chest from May 2018.  Pathologic retroperitoneal lymphadenopathy has improved slightly when compared to a previous abdominal CT from February 2017.  Please see above details.  2. Mild aneurysmal dilation of the infrarenal abdominal aorta, with maximal transverse diameter of 3.7 cm.  Maximal transverse diameter on a prior study from 2017 was 2.5 cm.  3. Additional findings as above           [facility] Unknown Rad Eap    Result Date: 1/14/2019  CMS MANDATED QUALITY DATA - CT RADIATION - 436 All CT scans at this facility utilize dose modulation, iterative reconstruction, and/or weight based dosing when  appropriate to reduce radiation dose to as low as reasonably achievable. Reason:Lymphoid leukemia TECHNIQUE: CT thorax with, CT abdomen  with, and CT pelvis with 100 mL Omnipaque 350. COMPARISON: CT chest dated 05/19/2018 and CT abdomen and pelvis dated 02/08/2017 CT THORAX: There is stable mediastinal, hilar and axillary adenopathy. There is coronary artery calcification. There is resolution of the groundglass opacities throughout the lungs. There are no confluent infiltrates, pulmonary nodules or pleural effusions. There are stable subcentimeter nodules in the left lobe of the thyroid gland. There are degenerative changes of the spine. CT ABDOMEN: The liver, pancreas, adrenal glands and gallbladder are normal. The spleen is enlarged. There is mesenteric and retroperitoneal adenopathy which is slightly smaller in size. -There is a portacaval node measuring 37 x 21 mm and previously measured 38 x 27 mm. -Periportal lymph node measuring 41 x 18 mm, previously measured 48 x 29 mm. -Left periaortic adenopathy measuring 34 x 23 mm and previously measured 40 x 42 mm- The kidneys enhance symmetrically without hydronephrosis or calculi. There are no thick-walled or dilated bowel loops. There is vascular calcification of aorta. There is a 3.0 x 3.0 cm infrarenal abdominal aortic aneurysm. CT PELVIS: There is adenopathy along the pelvic sidewalls bilaterally which has decreased in size. -The left common iliac adenopathy measuring 36 x 11 mm, previously measured 46 x 14 mm -the right common iliac adenopathy measuring 46 x 10 mm. Previously measured 53 x 15 mm. The bladder is normal. The prostate gland is enlarged. There are degenerative changes of the spine. There is grade 1 anterolisthesis of L5 on S1 with bilateral pars defects.     IMPRESSION: Stable mediastinal, hilar and axillary adenopathy with resolution of the airspace disease within the lungs Decrease in size of the mesenteric, retroperitoneal and pelvic adenopathy.  Stable infrarenal abdominal aortic aneurysm Splenomegaly     Read and electronically signed by: Jeniffer Arredondo MD on 1/14/2019 9:14 AM CST JENIFFER ARREDONDO MD      I have reviewed all available lab results and radiology reports.    Assessment/Plan:   (1) 85 y.o. male with  diagnosis of CLL who has been referred by Dr Rony Victoria for continuation of care by medical hematology/oncology.   - BM biopsy  12/4/2015 with CLL  - diagnosis of SLL in 2004 and s/p FCR x6 in 2007  - s/p imbruvica in past (stopped in May 2018)  - latest wbc at 4.8; lymph 56%  - latest CT on 8/7/2019 showing increase in the LAD  - platelets are 111,000  - He was recently hospitalized at [facility] and seen by Dr Wheeler. Dr Wheeler has recommended Rituximab. He is starting tomorrow.   - discussed the rituximab regimen and the potential side-effect profile; he is getting chemotherapy school today with [facility]  - he saw Dr Don on Oct 21st 2019  - he has had 3 of the 4 weeks of rituximab so far; Dr Don recommends him to eventually get rituximab monthly x6 with daily oral Venetoclax for two years total.   - completed rituximab (4th) week of rituximab and  S/p 6 monthly rituximab with Venetoclax oral but is taking only 2 pills daily due to the counts  - he is now just on the venetclax  - he saw Dr Don again on Dec 23rd 2019 and sees him again in March 16th 2020  - latest PEt on 1/24/2020 looks really good    8/27/2020:  - he saw Dr Don last month at [facility]  - latest PEt from 6/24/2020 looks good  - he remains on just the oral venetoclax at 2 pills per day  - he sees Dr Don again in about 3 months (Oct 2020)    11/18/2020:  - he is doing well with the Venetoclax  - due for repeat PEt in Dec 2020  - he sees Dr Don again on Dec 23rd 2020    1/20/2021:  - he is doing well with the Venetoclax  - He saw Dr Don at [facility] in Dec 2020. He is now off rituximab monthly and remains only on the oral Venetoclax but at 2 pills daily . He sees Dr Don again in  feb 2021  - Recent PEt on  1/18/2021 looks stable except for incidental aneurysm in aorta; so we are referring him to Dr Kapadia with vascualr    4/20/2021:  - he saw Dr Don in Feb 2021 and sees him again in Nov 2021  - repeat PEt in June/july 2021  - continued on Venetoclax and regular blood checks    7/20/2021:  - he continues on the Venetoclax  - mild leucopenia from the medication  - PEt on 7/19/2021 seems to be stable    9/21/2021:  - doing ok overall with some occasional HA's and general lack of energy  - he sees Dr Don again on nov 1st and hopefully he can discontinue the venetoclax thereafter  - Head CT on 8/27/2021 was relatively negative    2/17/2022:  -  He last showed for oncology appointment in Sept 2021  - He had covid in Jan 2022 and he received the infusion but did not require hospitalization  - He saw Dr Erazo couple of weeks ago  - He is planning to have cervical spine surgery with Dr Mai in the near future.  - He sees Dr Don at [facility] this coming Monday. He is now off rituximab monthly and he is also now off the oral Venetoclax (expect he will be getting a repeat bone marrow biopsy)    3/31/2022:  - He had recent telemed visit Dr Don at [facility] and they were pleased with the latest bone marrow biopsy. He is now off rituximab monthly and he is also now off the oral Venetoclax      6/6/2022:  - He had surgery on his cervical spine with Dr Mai on April 8th 2022 and had postoperative problems related to the intubation and anesthesia and was in the hospital for about 3 weeks. He had aspiration pneumonia and bout of respiratory failure requiring mech vent support.  He has residual swallowing issues and has a peg tube. He was seen Dr Garcia with GI while in hospital. He was discharged on 4/18. He has had home health coming to house couple times of week. He reports that he is doing better    - he is due to see Dr Don again at [facility] in the near future and he has since been off all therapy       8/11/2022:  - He was recently hospitalized with fever and pneumonia. He is feeling better  - He is swallowing and eating better; peg tube since removed  - He previously had bout of oral mucosal ulcerations for which he had biopsies at [facility] which were negative for any malignancy. This has since resolved.  - He previously had telemed visit Dr Don at [facility] but does not know when he sees him again.. He had previous bone marrow biopsy with good report. He has been off rituximab and the oral Venetoclax since last Nov 2021.  - CT scans on 7/2/2022 with some progression of the LAD in his body   - will get PEt and aslk Dr Don to see him again    9/29/2022:  - He saw Dr Don again at [facility] and they are considering giving him a regimen of chemotherapy (some program or clinical trial0 over at [facility] but he reports that they have since decided against proceeding in that direction.  - he was supposed to see Dr Don on 9/12 but that appointment was cancelled per patient   - he had PET on 8/29/2022 which was stable    11/23/2022:  - He was recently hospitalized at [facility]; he is doing better and feeling better overall.  - He was supposed to see Dr Don again at [facility] after discharge but does not see him till Dec 2022  - CT on 11/8 with some increase in speel size and LAD  - will resume venetoclax in meantime and he needs to f/u with Dr Don for further directives  - get PEt    1/9/2023:  - He talked to Dr Don via telemed in Dec 2022 and was supposed to start venetoclax but has not done so as of yet. He reports that the specialty pharmacy had called him but he has not received the drug as of yet  - Dr Don was evaluating him for clinical trial but patient is not inclined to proceed in that direction  - Need Dr Don's last notes  - he had PEt on 12/2/2022 with some mild enlargement of the LN's  - he had bone marrow biopsy on 12/8/2022 with pathology report still unavailable but per review looks like he has about 14-17%  involvement with NHL  - will reach out to Dr Don and the speciality pharmacy  - need the assistance of the patient navigator  - WBC has increased to 18.39    2/15/2023:  - He has since started Gazyva per Dr Don's direction; he is feeling better overall and the LAD is shrinking down in the neck  - hgb at 11.4 and plats 36,000    4/12/2023:  - continued on Gazyva  - sees Dr Don again within the next month  - wbc at 2.71, hgb 10.4 and plats 57,000     7/5/2023:  - he has one more Gazyva left and is continued on the venetoclax  - latest wbc at 2.06  - hgb at 11.4 and plat 79,000    8/30/2023:  - he has completed the Gazyva and has now just continued on Venetoclax  - counts are a little low, so will hold therapy for one week  - continue to check labs weekly  - he saw Dr Don couple of weeks ago and sees him again in a couple of months  - due for repeat PET         (2) Hx/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  - followed  By Dr Kee  - CEA 1.4 on 4/8/2021  - CT scans on 1/14/2019 stable  - PET done in jan 2021 on chart    3/31/2022:  - CEA at 0.8  - PET on 3/14/2022 negative for recurrence    9/29/2022:  - CEA 0.9  - PET on chart     11/23/2022:  - latest CEA at 1.2    2/15/2023:  - he had PET on 12/2/2022     (3) Iron deficiency anemia s/p IV iron couple weeks ago  - s/p periodic IV iron therapies         (4) HTN - on BP meds     (6) Chronic neck and back issues - followed by Dr Ryan Rivero     (7) DM - currently off all meds     (8) Hypercholesterolemia - on meds    (9)  - followed by Dr Whitney    (10) Chronic Leucopenia and thrombocytopenia - due to chemotherapy agents and lymphoma/leukemia          VISIT DIAGNOSES:      Encounter Diagnoses   Name Primary?    CLL (chronic lymphocytic leukemia) Yes    History of lung cancer - ANDRES NSCLC 2004     Iron deficiency     Iron deficiency anemia, unspecified iron deficiency anemia type     Lymphoma, unspecified body region, unspecified lymphoma  type     Pancytopenia     Thrombocytopenia     Splenomegaly     Chemotherapy induced neutropenia     Lymphadenopathy, generalized     Lymphadenopathy, cervical            PLAN:  Continued with just the oral oral venetoclax (but hold x 1 week due to the counts); check labs weekly; resume IV iron as needed; encouraged continuation of oral iron  2.  F/u with Dr Don as directed - couple of months per patient  3.  F/u with PCP, Pulm, , etc  4.  Monitor labs as directed  5.   F/u with Dr Kapadia with vascular for the aortic aneurysm as directed  6.  F/u with Dr Hyman as directed  7. Set up f/u PET          RTC in  4 weeks with Whit; 8 weeks with myself  Fax note to Kacey Kee, Ryan Rivero, Chelo, Karla, Florencia/Liam, Kang; Primo; Radha        Discussion:       Total Time spent on patient:    I spent over 25 mins of time with the patient. Reviewed results of the recently ordered labs, tests, reports and studies; made directives with regards to the results. Over half of this time was spent couseling and coordinating care, making treatment and analytical decisions; ordering necessary labs, tests and studies; and discussing treatment options and setting up treatment plan(s) if indicated.        COVID-19 Discussion:    I had long discussion with patient and any applicable family about the COVID-19 coronavirus epidemic and the recommended precautions with regard to cancer and/or hematology patients. I have re-iterated the CDC recommendations for adequate hand washing, use of hand -like products, and coughing into elbow, etc. In addition, especially for our patients who are on chemotherapy and/or our otherwise immunocompromised patients, I have recommended avoidance of crowds, including movie theaters, restaurants, churches, etc. I have recommended avoidance of any sick or symptomatic family members and/or friends. I have also recommended avoidance of any raw and unwashed food products, and general avoidance  of food items that have not been prepared by themselves. The patient has been asked to call us immediately with any symptom developments, issues, questions or other general concerns.          I have explained all of the above in detail and the patient understands all of the current recommendation(s). I have answered all of their questions to the best of my ability and to their complete satisfaction.   The patient is to continue with the current management plan.        Chemotherapy Discussion:      I discussed the available treatment option(s) in accordance with the latest/current national evidence-based guidelines (NCCN, UpToDate, NCI, ASCO, etc where applicable), their overall age/condition and their co-morbidities. I also went over the risks and benefits of the chemotherapy with regard to their particular cancer type, their cancer stage, their age/condition, and their co-morbidities. I provided literature on the chemotherapy regimen and discussed the chemotherapy side-effect profiles of the drug(s). I discussed the importance of compliance with obtaining and monitoring weekly lab work, and went over the potential hematopathology issues and risks with anemia, leucopenia and thrombocytopenia that can occur with chemotherapy. I discussed the potential risks of liver and kidney damage, which could be permanent and could necessitate dialysis long-term if kidney failure developed. I discussed the emetic and/or diarrheal potential of the regimen and the potential need for use of antiemetic and anti-diarrheal medications. I discussed the risk for development of anaphylactic shock, bronchospasm, dysrhythmia, and respiratory/cardiovascular arrest and/or failure. I discussed the potential risks for development of alopecia, cold sensory issues, ringing in ears, vertigo, cataracts, glaucoma, and neuropathy, all of which could end up being chronic and life-long. Some chemotherpyI discussed the risks of hand-foot syndrome and  rashes, and development of other autoimmune mediated processes such as pneumonitis, hepatitis, and colitis which could be life threatening. I discussed the risks of the potential development of a rare but fatal viral mediated disease known as PML (Progressive Multifocal Leukoencephalopathy), and risk of future development of leukemia and/or lymphoma from use of certain chemotherapy agents. I discussed the need for neutropenic precautions, basic hygiene/sanitation behaviors and dietary restrictions.    The patient's consent has been obtained to proceed with the chemotherapy.The patient will be referred to [facility] for training and education on chemotherapy, use of antiemetics and/or anti-diarrheals, use of NSAID's, potential chemotherapy side-effects, and any specific recommendations and precautions with the particular chemotherapy agents.       Immunologic Therapy Discussion:    I discussed the available treatment option(s) in accordance with the latest/current national evidence-based guidelines (NCCN, UpToDate, NCI, ASCO, etc where applicable), their overall age/condition and their co-morbidities. I went over the risks and benefits of the immunotherapy with regard to their particular cancer type, their cancer stage, their age/condition, and their co-morbidities. I provided literature on the immunotherapy regimen and discussed the immunotherapy side-effect profiles of the drug(s). I discussed the importance of compliance with obtaining and monitoring weekly lab work, and went over the potential hematopathology issues and risks of hemato-pathologic issues with anemia, leucopenia and/or thrombocytopenia and effects on thyroid function that can occur with immunotherapy. The patient will most likely need to have there thyroid functions monitored by their PCP and may need to take thyroid medication while on the immunotherapy. I discussed the potential risks of liver and kidney damage, which  could be permanent and could necessitate dialysis long-term if kidney failure developed. I discussed the emetic and/or diarrheal potential of the regimen and the potential need for use of antiemetic and anti-diarrheal medications. I discussed the risk for development of anaphylactic shock, bronchospasm, dysrhythmia, and respiratory/cardiovascular arrest and/or failure. I discussed the potential risks for development of alopecia, cold sensory issues, ringing in ears, vertigo and neuropathy, all of which could end up being chronic and life-long. I discussed the risks of hand-foot syndrome and rashes, and development of other autoimmune mediated processes such as pneumonitis, hepatitis and colitis which could potentially be life threatening. I discussed the risks of the potential development of a rare but fatal viral mediated disease known as PML (Progressive Multifocal Leukoencephalopathy), and risk of future development of leukemia and/or lymphoma from use of certain immunotherapy agents. I discussed the possibility that immunologic therapy cold worsen or promote progression of any underlying autoimmune diseases such as sarcoidosis, ulcerative colitis, Crohn's disease, psoriasis, rheumatoid disorders, scleroderma, autoimmune nephritis disorders, Hashimoto's thyroiditis, and Lupus among others. I discussed the need for neutropenic precautions, basic hygiene/sanitation behaviors and dietary restrictions.    The patient's consent has been obtained to proceed with the immunotherapy.The patient will be referred to [facility] for training and education on immunotherapy, use of antiemetics and/or anti-diarrheals, use of NSAID's, potential immunotherapy side-effects, and any specific recommendations and precautions with the particular immunotherapy agents.      I answered all of the patient's (and family's, if applicable) questions to the best of my ability and to their complete satisfaction.  The patient acknowledged full understanding of the risks, recommendations and plan(s).      Iron Infusion Therapy Discussion:     I provided literature/learning materials on the particular IV iron regimen and discussed the potential side-effect profiles of the drug(s). I discussed the importance of compliance with obtaining and monitoring requested lab work, and went over the potential risk for the development of anaphylactic shock, bronchospasm, dysrhythmia, liver and/or kidney damage, and respiratory/cardiovascular arrest and/or failure. I discussed the potential risks for development of alopecia, fevers, itching, chills and/or rigors, cold sensory issues, ringing in ears, vertigo and neuropathy, all of which are usually acute but sometimes could end up being chronic and life-long. I discussed the risks of hand-foot syndrome and rashes, and development of other autoimmune mediated processes such as pneumonitis and colitis which could be life threatening.     The patient's consent has been obtained to proceed with the IV iron therapy.The patient will be referred to [facility] for training and education on IV iron therapy, use of antiemetics and/or anti-diarrheals, use of NSAID's, potential IV iron therapy side-effects, and any specific recommendations and precautions with the particular IV iron agents.      I answered all of the patient's (and family's, if applicable) questions to the best of my ability and to their complete satisfaction. The patient acknowledged full understanding of the risks, recommendations and plan(s).          I answered all of the patient's (and family's, if applicable) questions to the best of my ability and to their complete satisfaction. The patient acknowledged full understanding of the risks, recommendations and plan(s).         Electronically signed by Drew Bai MD                                 0 = understands/communicates without difficulty

## 2024-09-24 NOTE — PATIENT PROFILE ADULT - FUNCTIONAL ASSESSMENT - BASIC MOBILITY 6.
3-calculated by average/Not able to assess (calculate score using [facility] averaging method)

## 2024-09-24 NOTE — CONSULT NOTE ADULT - SUBJECTIVE AND OBJECTIVE BOX
EP Attending    HISTORY OF PRESENT ILLNESS: HPI:  88 year-old female with HTN, HLD, thrombocytosis with unprovoked PE in 2022 (on Eliquis), TIA (on asa), cholecystectomy, recent diverticulitis presenting as a code stroke for dizziness and unsteady gait.  Patient was shopping and at 1:30 PM when she started having dizziness with associated nausea.  She held on the shopping cart due to unsteadiness, drove home, felt like she was going to pass out (but no LOC), laid down on the couch until daughter arrived to bring her to ED. Symptoms now completely resolved without taking Meclizine or Reglan ED ordered.  Currently, no complaints (22 Sep 2024 23:27)    vague history of dizziness/pre-syncope.  No overt history of fainting.  Persistent symptoms at home. On arrival, normal ECG.  A 10 pt ROS is otherwise negative.  Her Cardiology followup is with Dr Vladislav Warren    PAST MEDICAL & SURGICAL HISTORY:  HTN (hypertension)  UTI (urinary tract infection), bacterial  2019 novel coronavirus disease (COVID-19)  in 2020 March  History of TIAs  Pulmonary embolism  History of cholecystectomy  H/O lysis of adhesions    MEDICATIONS  (STANDING):  apixaban 2.5 milliGRAM(s) Oral every 12 hours  aspirin enteric coated 81 milliGRAM(s) Oral daily  atenolol  Tablet 12.5 milliGRAM(s) Oral daily  atorvastatin 40 milliGRAM(s) Oral at bedtime  enalapril 2.5 milliGRAM(s) Oral <User Schedule>  enalapril 5 milliGRAM(s) Oral <User Schedule>  escitalopram 15 milliGRAM(s) Oral daily  influenza  Vaccine (HIGH DOSE) 0.5 milliLiter(s) IntraMuscular once    Allergies    penicillin (Unknown)    Intolerances      FAMILY HISTORY:  No pertinent family history in first degree relatives    Non-contributary for premature coronary disease or sudden cardiac death    SOCIAL HISTORY:    [x ] Non-smoker  [ ] Smoker  [ ] Alcohol      PHYSICAL EXAM:  T(C): 36.8 (09-24-24 @ 07:50), Max: 37.8 (09-23-24 @ 17:16)  HR: 75 (09-24-24 @ 11:40) (61 - 75)  BP: 136/71 (09-24-24 @ 11:40) (103/58 - 166/78)  RR: 18 (09-24-24 @ 07:50) (18 - 18)  SpO2: 94% (09-24-24 @ 11:40) (92% - 96%)  Wt(kg): --    Appearance: Normal	  HEENT:   Normal oral mucosa, PERRL, EOMI	  Lymphatic: No lymphadenopathy , no edema  Cardiovascular: Normal S1 S2, No JVD, No murmurs , Peripheral pulses palpable 2+ bilaterally  Respiratory: Lungs clear to auscultation, normal effort 	  Gastrointestinal:  Soft, Non-tender, + BS	  Skin: No rashes, No ecchymoses, No cyanosis, warm to touch  Musculoskeletal: Normal range of motion, normal strength  Psychiatry:  Mood & affect appropriate      TELEMETRY: sinus rhythm	    ECG:  NSR  Echo:  < from: TTE Limited W or WO Ultrasound Enhancing Agent (06.18.24 @ 08:26) >  CONCLUSIONS:      1. Left ventricular cavity is normal in size. Left ventricular wall thickness is normal. Left ventricular systolic function is normal with an ejection fraction of 74 % by Steve's method of disks. There are no regional wall motion abnormalities seen.   2. Normal left ventricular diastolic function, with normal filling pressure.   3. Normal right ventricular cavity size, with normal wall thickness, and normal systolic function.   4. The right atrium is moderately dilated.   5. No pericardial effusion seen.   6. Compared to the transthoracic echocardiogram performed on 11/14/2022, No wall motion abnormality is identified. PASP is lower.   7. There is normal LV mass and normal geometry.    < end of copied text >    	  LABS:	 	                          12.8   5.74  )-----------( 632      ( 23 Sep 2024 07:39 )             39.6     09-23    138  |  101  |  11  ----------------------------<  68[L]  3.5   |  25  |  0.61    Ca    10.0      23 Sep 2024 07:39    TPro  8.1  /  Alb  4.7  /  TBili  0.6  /  DBili  x   /  AST  36  /  ALT  40  /  AlkPhos  91  09-22      ASSESSMENT/PLAN: Ms Powell is a pleasant 88y Female here w/ lifestyle-limiting dizziness/presyncope.  No tachy or bradyarrhythmia on ECG/telemetry.  Has outpatient followup w/ [facility].  Check a standing weight.    She is on apixaban 2.5mg BID for secondary prevention of pulmonary embolism (not AFib)  (If she were to have an atrial arrhythmia, this would be a sufficient dose if she is <60kg.)    Telemetry monitoring here in the hospital.  No invasive EP workup planned at this time.  Recommend outpatient ECG monitoring (Holter/MCOT or ILR) with her usual cardiology team.      Alpesh Meza M.D.  Cardiac Electrophysiology    office [phone]  pager [phone]

## 2024-09-25 LAB
ALBUMIN SERPL ELPH-MCNC: 4 G/DL — SIGNIFICANT CHANGE UP (ref 3.3–5)
ALP SERPL-CCNC: 75 U/L — SIGNIFICANT CHANGE UP (ref 40–120)
ALT FLD-CCNC: 24 U/L — SIGNIFICANT CHANGE UP (ref 10–45)
ANION GAP SERPL CALC-SCNC: 12 MMOL/L — SIGNIFICANT CHANGE UP (ref 5–17)
AST SERPL-CCNC: 21 U/L — SIGNIFICANT CHANGE UP (ref 10–40)
BILIRUB SERPL-MCNC: 0.6 MG/DL — SIGNIFICANT CHANGE UP (ref 0.2–1.2)
BUN SERPL-MCNC: 12 MG/DL — SIGNIFICANT CHANGE UP (ref 7–23)
CALCIUM SERPL-MCNC: 9.8 MG/DL — SIGNIFICANT CHANGE UP (ref 8.4–10.5)
CHLORIDE SERPL-SCNC: 102 MMOL/L — SIGNIFICANT CHANGE UP (ref 96–108)
CO2 SERPL-SCNC: 22 MMOL/L — SIGNIFICANT CHANGE UP (ref 22–31)
CREAT SERPL-MCNC: 0.55 MG/DL — SIGNIFICANT CHANGE UP (ref 0.5–1.3)
D DIMER BLD IA.RAPID-MCNC: 385 NG/ML DDU — HIGH
EGFR: 89 ML/MIN/1.73M2 — SIGNIFICANT CHANGE UP
GLUCOSE SERPL-MCNC: 104 MG/DL — HIGH (ref 70–99)
HCT VFR BLD CALC: 39.2 % — SIGNIFICANT CHANGE UP (ref 34.5–45)
HGB BLD-MCNC: 12.6 G/DL — SIGNIFICANT CHANGE UP (ref 11.5–15.5)
MAGNESIUM SERPL-MCNC: 1.7 MG/DL — SIGNIFICANT CHANGE UP (ref 1.6–2.6)
MCHC RBC-ENTMCNC: 29.1 PG — SIGNIFICANT CHANGE UP (ref 27–34)
MCHC RBC-ENTMCNC: 32.1 GM/DL — SIGNIFICANT CHANGE UP (ref 32–36)
MCV RBC AUTO: 90.5 FL — SIGNIFICANT CHANGE UP (ref 80–100)
NRBC # BLD: 0 /100 WBCS — SIGNIFICANT CHANGE UP (ref 0–0)
PHOSPHATE SERPL-MCNC: 2.3 MG/DL — LOW (ref 2.5–4.5)
PLATELET # BLD AUTO: 540 K/UL — HIGH (ref 150–400)
POTASSIUM SERPL-MCNC: 3.9 MMOL/L — SIGNIFICANT CHANGE UP (ref 3.5–5.3)
POTASSIUM SERPL-SCNC: 3.9 MMOL/L — SIGNIFICANT CHANGE UP (ref 3.5–5.3)
PROT SERPL-MCNC: 7 G/DL — SIGNIFICANT CHANGE UP (ref 6–8.3)
RBC # BLD: 4.33 M/UL — SIGNIFICANT CHANGE UP (ref 3.8–5.2)
RBC # FLD: 15.5 % — HIGH (ref 10.3–14.5)
SODIUM SERPL-SCNC: 136 MMOL/L — SIGNIFICANT CHANGE UP (ref 135–145)
WBC # BLD: 6.57 K/UL — SIGNIFICANT CHANGE UP (ref 3.8–10.5)
WBC # FLD AUTO: 6.57 K/UL — SIGNIFICANT CHANGE UP (ref 3.8–10.5)

## 2024-09-25 PROCEDURE — 71045 X-RAY EXAM CHEST 1 VIEW: CPT | Mod: 26

## 2024-09-25 RX ORDER — GUAIFENESIN 100 MG/5ML
200 SOLUTION ORAL EVERY 6 HOURS
Refills: 0 | Status: DISCONTINUED | OUTPATIENT
Start: 2024-09-25 | End: 2024-09-27

## 2024-09-25 RX ORDER — CHLORHEXIDINE GLUCONATE ORAL RINSE 1.2 MG/ML
1 SOLUTION DENTAL DAILY
Refills: 0 | Status: DISCONTINUED | OUTPATIENT
Start: 2024-09-25 | End: 2024-09-27

## 2024-09-25 RX ORDER — SOD PHOS DI, MONO/K PHOS MONO 250 MG
1 TABLET ORAL THREE TIMES A DAY
Refills: 0 | Status: COMPLETED | OUTPATIENT
Start: 2024-09-25 | End: 2024-09-26

## 2024-09-25 RX ADMIN — APIXABAN 2.5 MILLIGRAM(S): 5 TABLET, FILM COATED ORAL at 05:30

## 2024-09-25 RX ADMIN — Medication 5 MILLIGRAM(S): at 17:47

## 2024-09-25 RX ADMIN — Medication 2.5 MILLIGRAM(S): at 05:29

## 2024-09-25 RX ADMIN — Medication 1 PACKET(S): at 21:32

## 2024-09-25 RX ADMIN — GUAIFENESIN 200 MILLIGRAM(S): 100 SOLUTION ORAL at 12:47

## 2024-09-25 RX ADMIN — APIXABAN 2.5 MILLIGRAM(S): 5 TABLET, FILM COATED ORAL at 17:45

## 2024-09-25 RX ADMIN — ATENOLOL 12.5 MILLIGRAM(S): 25 TABLET ORAL at 05:30

## 2024-09-25 RX ADMIN — Medication 15 MILLIGRAM(S): at 12:47

## 2024-09-25 RX ADMIN — ATORVASTATIN CALCIUM 40 MILLIGRAM(S): 10 TABLET, FILM COATED ORAL at 21:32

## 2024-09-25 RX ADMIN — REMDESIVIR 200 MILLIGRAM(S): 5 INJECTION INTRAVENOUS at 17:47

## 2024-09-25 RX ADMIN — Medication 81 MILLIGRAM(S): at 12:47

## 2024-09-25 NOTE — CONSULT NOTE ADULT - ASSESSMENT
88 year-old female with HTN, HLD, thrombocytosis with unprovoked PE in 2022 (on Eliquis), TIA (on asa), cholecystectomy, recent diverticulitis presenting as a code stroke for dizziness and unsteady gait.  Patient was shopping and at 1:30 PM when she started having dizziness with associated nausea.  She held on the shopping cart due to unsteadiness, drove home, felt like she was going to pass out (but no LOC), laid down on the couch until daughter arrived to bring her to ED. Symptoms now completely resolved without taking Meclizine or Reglan ED ordered.  Currently, no complaints (22 Sep 2024 23:27)    now sheis found to have covid infection hence pulm called:   she has no underlying lung disease     covid infection:   Hx of PE; secondary to essential thrombocytosis?   TIA      covid infection:   -seems pretty good:   -on room air:  d2/3 of remdesivir;   -d d dimer is slightly high : expectred from covid:  clinical probability for pe seems pretty low at this time:   -no need for dexa;   -do chest xray  : none done on admission   Hx of PE; secondary to essential thrombocytosis?   -o eliqis already   -PLTs counts are high : cont to monitor   TIA  -cont home meds:     zeferino arevalo    
Assessment: 88F PMHx HTN. HLD, PE on Eliquis, TIA on aspirin, presented as code stroke for episode of dizziness/lightheadedness. Symptoms largely improved prior to presentation, however noted with elevated blood pressure >200 systolic (patient reports BP this morning was 110s). Had similar episode in June, was diagnosed as TIA. Initial VS in ED: /95. Exam: No focal neurologic deficits, +Romberg, falls to the left during ambulation (per daughter this is patient's baseline).    Stroke imaging was unremarkable.  Labs reviewed, noted for chronic thrombocytosis    pre-mRS: 1  LKN: 9/22 @1330  NIHSS: 0    Not a tenecteplase candidate due to nondisabling symptoms, recent Eliquis use.  Not a mechanical thrombectomy candidate due to no LVO.    Impression: Episode of lightheadedness and nausea which seems more consistent with presyncopal episode, which may have been brought on by stress or dehydration. Acute stroke vs TIA cannot be excluded.     Recommendations:    Diagnostics:  [] MRI brain without contrast   [] Lipid panel, HbA1c  [] CBC, CMP, coagulation panel, troponin  [] Continue home Eliquis 2.5 BID  [] Continue home ASA 81 mg PO    [] Atorvastatin 80mg (titrate to LDL < 70)   [] Lovenox SQ for DVT prophylaxis  [] Check orthostatics   [] Recommend further medicine workup for etiology of chronic thrombocytosis  [] Gradual BP correction   [] Telemonitoring  [] BGM goals 140-180      To be seen on AM rounds. Case and plan not final until Attending attestation 
Patient seen and examined, agree with above assessment and plan as transcribed above.    - EP floyd Meza called    Zi Shepherd MD, [facility]  BEEPER [phone]

## 2024-09-25 NOTE — CONSULT NOTE ADULT - SUBJECTIVE AND OBJECTIVE BOX
09-25-24 @ 14:48    Patient is a 88y old  Female who presents with a chief complaint of dizziness (25 Sep 2024 12:38)      HPI:  88 year-old female with HTN, HLD, thrombocytosis with unprovoked PE in 2022 (on Eliquis), TIA (on asa), cholecystectomy, recent diverticulitis presenting as a code stroke for dizziness and unsteady gait.  Patient was shopping and at 1:30 PM when she started having dizziness with associated nausea.  She held on the shopping cart due to unsteadiness, drove home, felt like she was going to pass out (but no LOC), laid down on the couch until daughter arrived to bring her to ED. Symptoms now completely resolved without taking Meclizine or Reglan ED ordered.  Currently, no complaints (22 Sep 2024 23:27)    now sheis found to have covid infection hence pulm called:   she has no underlying lung disease     ?FOLLOWING PRESENT  [ x] Hx of PE/DVT, [x ] Hx COPD, [ x] Hx of Asthma, x[ ] Hx of Hospitalization, [ x]  Hx of BiPAP/CPAP use, [x ] Hx of VAISHNAVI    Allergies    penicillin (Unknown)    Intolerances        PAST MEDICAL & SURGICAL HISTORY:  HTN (hypertension)      UTI (urinary tract infection), bacterial      2019 novel coronavirus disease (COVID-19)  in 2020 March      History of TIAs      Pulmonary embolism      History of cholecystectomy      H/O lysis of adhesions          FAMILY HISTORY:  No pertinent family history in first degree relatives        Social History: [x  ] TOBACCO                  [  x] ETOH                                 [x  ] IVDA/DRUGS    REVIEW OF SYSTEMS      General:	x    Skin/Breast:x  	  Ophthalmologic:x  	  ENMT:	x    Respiratory and Thorax:  dizziness  	  Cardiovascular:	x    Gastrointestinal:	x    Genitourinary:	xx    Musculoskeletal:	x    Neurological:	x    Psychiatric:	x    Hematology/Lymphatics:	x    Endocrine:	x    Allergic/Immunologic:	x    MEDICATIONS  (STANDING):  apixaban 2.5 milliGRAM(s) Oral every 12 hours  aspirin enteric coated 81 milliGRAM(s) Oral daily  atenolol  Tablet 12.5 milliGRAM(s) Oral daily  atorvastatin 40 milliGRAM(s) Oral at bedtime  enalapril 2.5 milliGRAM(s) Oral <User Schedule>  enalapril 5 milliGRAM(s) Oral <User Schedule>  escitalopram 15 milliGRAM(s) Oral daily  influenza  Vaccine (HIGH DOSE) 0.5 milliLiter(s) IntraMuscular once  remdesivir  IVPB   IV Intermittent   remdesivir  IVPB 100 milliGRAM(s) IV Intermittent every 24 hours    MEDICATIONS  (PRN):  guaiFENesin Oral Liquid (Sugar-Free) 200 milliGRAM(s) Oral every 6 hours PRN Cough       Vital Signs Last 24 Hrs  T(C): 36.6 (25 Sep 2024 12:15), Max: 37.7 (24 Sep 2024 17:17)  T(F): 97.8 (25 Sep 2024 12:15), Max: 99.8 (24 Sep 2024 17:17)  HR: 68 (25 Sep 2024 12:15) (68 - 83)  BP: 110/72 (25 Sep 2024 12:15) (110/72 - 160/81)  BP(mean): --  RR: 18 (25 Sep 2024 05:55) (18 - 18)  SpO2: 94% (25 Sep 2024 12:15) (92% - 97%)    Parameters below as of 25 Sep 2024 12:15  Patient On (Oxygen Delivery Method): room air    Orthostatic VS          I&O's Summary    24 Sep 2024 07:01  -  25 Sep 2024 07:00  --------------------------------------------------------  IN: 0 mL / OUT: 2 mL / NET: -2 mL        Physical Exam:   GENERAL: NAD, well-groomed, well-developed  HEENT: ANTHONY/   Atraumatic, Normocephalic  ENMT: No tonsillar erythema, exudates, or enlargement; Moist mucous membranes, Good dentition, No lesions  NECK: Supple, No JVD, Normal thyroid  CHEST/LUNG: Clear to auscultation bilaterally  CVS: Regular rate and rhythm; No murmurs, rubs, or gallops  GI: : Soft, Nontender, Nondistended; Bowel sounds present  NERVOUS SYSTEM:  Alert & Oriented X3  EXTREMITIES:  2+ Peripheral Pulses, No clubbing, cyanosis, or edema  LYMPH: No lymphadenopathy noted  SKIN: No rashes or lesions  ENDOCRINOLOGY: No Thyromegaly  PSYCH: Appropriate    Labs:  Venous<52<4>>18<<7.375>>Venous<<3><<4><<5<<189>>COVID-19 PCR: Detected (24 Sep 2024 13:26)  SARS-CoV-2: Detected (24 Sep 2024 11:41)                              12.6   6.57  )-----------( 540      ( 25 Sep 2024 06:54 )             39.2                         12.8   5.74  )-----------( 632      ( 23 Sep 2024 07:39 )             39.6                         13.6   5.68  )-----------( 700      ( 22 Sep 2024 17:00 )             42.0     09-25    136  |  102  |  12  ----------------------------<  104[H]  3.9   |  22  |  0.55  09-23    138  |  101  |  11  ----------------------------<  68[L]  3.5   |  25  |  0.61  09-22    137  |  100  |  12  ----------------------------<  108[H]  4.4   |  25  |  0.57    Ca    9.8      25 Sep 2024 06:55  Phos  2.3     09-25  Mg     1.7     09-25    TPro  7.0  /  Alb  4.0  /  TBili  0.6  /  DBili  x   /  AST  21  /  ALT  24  /  AlkPhos  75  09-25  TPro  8.1  /  Alb  4.7  /  TBili  0.6  /  DBili  x   /  AST  36  /  ALT  40  /  AlkPhos  91  09-22    CAPILLARY BLOOD GLUCOSE        LIVER FUNCTIONS - ( 25 Sep 2024 06:55 )  Alb: 4.0 g/dL / Pro: 7.0 g/dL / ALK PHOS: 75 U/L / ALT: 24 U/L / AST: 21 U/L / GGT: x             Urinalysis Basic - ( 25 Sep 2024 06:55 )    Color: x / Appearance: x / SG: x / pH: x  Gluc: 104 mg/dL / Ketone: x  / Bili: x / Urobili: x   Blood: x / Protein: x / Nitrite: x   Leuk Esterase: x / RBC: x / WBC x   Sq Epi: x / Non Sq Epi: x / Bacteria: x      D DImer  D-Dimer Assay, Quantitative: 385 ng/mL DDU (09-25 @ 06:56)      Studies  Chest X-RAY  CT SCAN Chest   CT Abdomen  Venous Dopplers: LE:   Others        rad< from: MR Head No Cont (09.23.24 @ 10:16) >  Major intracranial vascular flow voids are preserved.    The paranasal sinuses and tympanomastoid cavities are clear.    Bilateral lens replacement.    IMPRESSION:    No evidence of acute ischemia. Focus of diffusion restriction in the   right centrum semiovale has normally evolved since 6/18/2024.  No acute hemorrhage, mass, or brain edema.  Mild age-related cerebral volume loss and ischemic gliotic changes,   similar to prior exam on 6/18/2024.    --- End of Report ---          NEFTALI SHAW MD; Resident Radiologist  This document has been electronically signed.  SANYA STOVALL MD; Attending Radiologist  This document has been electronically signed. Sep 23 2024 11:13AM    < end of copied text >

## 2024-09-26 ENCOUNTER — TRANSCRIPTION ENCOUNTER (OUTPATIENT)
Age: 89
End: 2024-09-26

## 2024-09-26 RX ORDER — ACETAMINOPHEN 325 MG
650 TABLET ORAL EVERY 6 HOURS
Refills: 0 | Status: DISCONTINUED | OUTPATIENT
Start: 2024-09-26 | End: 2024-09-27

## 2024-09-26 RX ORDER — METHYLPREDNISOLONE ACETATE 80 MG/ML
20 INJECTION, SUSPENSION INTRA-ARTICULAR; INTRALESIONAL; INTRAMUSCULAR; SOFT TISSUE DAILY
Refills: 0 | Status: DISCONTINUED | OUTPATIENT
Start: 2024-09-26 | End: 2024-09-27

## 2024-09-26 RX ORDER — BENZOCAINE AND LEVOMENTHOL 200; 5 MG/G; MG/G
1 SPRAY TOPICAL EVERY 4 HOURS
Refills: 0 | Status: DISCONTINUED | OUTPATIENT
Start: 2024-09-26 | End: 2024-09-27

## 2024-09-26 RX ADMIN — Medication 81 MILLIGRAM(S): at 11:32

## 2024-09-26 RX ADMIN — GUAIFENESIN 200 MILLIGRAM(S): 100 SOLUTION ORAL at 18:04

## 2024-09-26 RX ADMIN — Medication 1 PACKET(S): at 12:40

## 2024-09-26 RX ADMIN — BENZOCAINE AND LEVOMENTHOL 1 LOZENGE: 200; 5 SPRAY TOPICAL at 18:03

## 2024-09-26 RX ADMIN — ATENOLOL 12.5 MILLIGRAM(S): 25 TABLET ORAL at 05:30

## 2024-09-26 RX ADMIN — Medication 2.5 MILLIGRAM(S): at 05:30

## 2024-09-26 RX ADMIN — Medication 650 MILLIGRAM(S): at 21:45

## 2024-09-26 RX ADMIN — Medication 15 MILLIGRAM(S): at 11:32

## 2024-09-26 RX ADMIN — REMDESIVIR 200 MILLIGRAM(S): 5 INJECTION INTRAVENOUS at 18:18

## 2024-09-26 RX ADMIN — Medication 650 MILLIGRAM(S): at 21:09

## 2024-09-26 RX ADMIN — APIXABAN 2.5 MILLIGRAM(S): 5 TABLET, FILM COATED ORAL at 05:30

## 2024-09-26 RX ADMIN — APIXABAN 2.5 MILLIGRAM(S): 5 TABLET, FILM COATED ORAL at 18:03

## 2024-09-26 RX ADMIN — Medication 650 MILLIGRAM(S): at 15:50

## 2024-09-26 RX ADMIN — Medication 1 PACKET(S): at 05:32

## 2024-09-26 RX ADMIN — BENZOCAINE AND LEVOMENTHOL 1 LOZENGE: 200; 5 SPRAY TOPICAL at 12:25

## 2024-09-26 RX ADMIN — ATORVASTATIN CALCIUM 40 MILLIGRAM(S): 10 TABLET, FILM COATED ORAL at 21:10

## 2024-09-26 RX ADMIN — GUAIFENESIN 200 MILLIGRAM(S): 100 SOLUTION ORAL at 11:32

## 2024-09-26 RX ADMIN — Medication 650 MILLIGRAM(S): at 14:53

## 2024-09-26 RX ADMIN — CHLORHEXIDINE GLUCONATE ORAL RINSE 1 APPLICATION(S): 1.2 SOLUTION DENTAL at 12:40

## 2024-09-26 RX ADMIN — METHYLPREDNISOLONE ACETATE 20 MILLIGRAM(S): 80 INJECTION, SUSPENSION INTRA-ARTICULAR; INTRALESIONAL; INTRAMUSCULAR; SOFT TISSUE at 14:54

## 2024-09-26 RX ADMIN — Medication 5 MILLIGRAM(S): at 18:14

## 2024-09-26 NOTE — DIETITIAN INITIAL EVALUATION ADULT - ORAL INTAKE PTA/DIET HISTORY
Reports having a good appetite at home, was following a low fiber diet for the past month at home for diverticulitis. Denies difficulty chewing/swallowing. Did not take any vitamins/minerals or oral nutritional supplement at home.

## 2024-09-26 NOTE — DIETITIAN INITIAL EVALUATION ADULT - REASON FOR ADMISSION
Chart Reviewed, Events noted   "Patient is a 88y old  Female who presents with a chief complaint of dizziness"

## 2024-09-26 NOTE — DISCHARGE NOTE PROVIDER - CARE PROVIDER_API CALL
REINALDO HERNANDEZ D  [address] [place] [address]  Phone: [phone]  Fax: [phone]  Follow Up Time:    REINALDO HERNANDEZ D  [address] [place] [address]  Phone: [phone]  Fax: [phone]  Established Patient  Follow Up Time: 1 week

## 2024-09-26 NOTE — DISCHARGE NOTE PROVIDER - HOSPITAL COURSE
HPI:  88 year-old female with HTN, HLD, thrombocytosis with unprovoked PE in 2022 (on Eliquis), TIA (on asa), cholecystectomy, recent diverticulitis presenting as a code stroke for dizziness and unsteady gait.  Patient was shopping and at 1:30 PM when she started having dizziness with associated nausea.  She held on the shopping cart due to unsteadiness, drove home, felt like she was going to pass out (but no LOC), laid down on the couch until daughter arrived to bring her to ED. Symptoms now completely resolved without taking Meclizine or Reglan ED ordered.  Currently, no complaints (22 Sep 2024 23:27)    Hospital Course:      Important Medication Changes and Reason:    Active or Pending Issues Requiring Follow-up:    Advanced Directives:   [ x] Full code  [ ] DNR  [ ] Hospice    Discharge Diagnoses:         HPI:  88 year-old female with HTN, HLD, thrombocytosis with unprovoked PE in 2022 (on Eliquis), TIA (on asa), cholecystectomy, recent diverticulitis presenting as a code stroke for dizziness and unsteady gait.  Patient was shopping and at 1:30 PM when she started having dizziness with associated nausea.  She held on the shopping cart due to unsteadiness, drove home, felt like she was going to pass out (but no LOC), laid down on the couch until daughter arrived to bring her to ED. Symptoms now completely resolved without taking Meclizine or Reglan ED ordered.  Currently, no complaints (22 Sep 2024 23:27)    Hospital Course:  88 year-old female with HTN, HLD, thrombocytosis with unprovoked PE in 2022 (on Eliquis), TIA (on asa), cholecystectomy, recent diverticulitis presents with acute dizziness and unsteadiness admitted to rule out TAIA/stroke  Neuro consulted,   Dizziness, resolved likely secondary to COVID 19 infection       Important Medication Changes and Reason: no changes needed in meds.    Active or Pending Issues Requiring Follow-up: Dr. Key   DCP with med rec discussed with Dr Mathew PT is medically cleared for dc home no skilled needs   Advanced Directives:   [ x] Full code  [ ] DNR  [ ] Hospice    Discharge Diagnoses:  Covid positive          HPI:  88 year-old female with HTN, HLD, thrombocytosis with unprovoked PE in 2022 (on Eliquis), TIA (on asa), cholecystectomy, recent diverticulitis presenting as a code stroke for dizziness and unsteady gait.  Patient was shopping and at 1:30 PM when she started having dizziness with associated nausea.  She held on the shopping cart due to unsteadiness, drove home, felt like she was going to pass out (but no LOC), laid down on the couch until daughter arrived to bring her to ED. Symptoms now completely resolved without taking Meclizine or Reglan ED ordered.  Currently, no complaints (22 Sep 2024 23:27)    Hospital Course:  88 year-old female with HTN, HLD, thrombocytosis with unprovoked PE in 2022 (on Eliquis), TIA (on asa), cholecystectomy, recent diverticulitis presents with acute dizziness and unsteadiness admitted to rule out TIA/stroke  Neuro consulted,   Dizziness, resolved likely secondary to COVID 19 infection     Near Syncope  - MRI neg for acute CVA  - Neuro consulted  - Orthostatics negative  - TSH WNL  - Recent TTE with normal LV function, no sig valvular disease. No need to repeat  - Recent Carotid dopplers with no sig stenosis  - Prior CT Cors in 2022 with minimal nonobstructive CAD  - Monitor telemetry, stable in NSR. EKG with NSR, narrow QRS  - EP consult appreciated - Recommend outpatient ECG monitoring (Holter/MCOT or ILR) with her usual cardiology team.    HTN  - Trend BP, now improved  - Continue Enalapril 2.5mg AM and 5mg PM  - Continue Atenolol 12.5mg daily    Hx PE  - Continue Eliquis per med    +COVID  - Supportive care s/p Remdesivir 3 day course   - Pulm consulted : prednisone low dosages added for congestion and mild rhonchi +  - stable on room air  -c/w prednisone for 3 days after discharge    - No further inpatient cardiac w/u planned  - Patient to f/u with her cardiologist Dr. Robert Loomis after discharge      Important Medication Changes and Reason: no changes needed in meds.    Active or Pending Issues Requiring Follow-up: Dr. Key   DCP with med rec discussed with Dr Mathew PT is medically cleared for dc home no skilled needs   Advanced Directives:   [ x] Full code  [ ] DNR  [ ] Hospice    Discharge Diagnoses:  Covid positive

## 2024-09-26 NOTE — DISCHARGE NOTE PROVIDER - NSDCMRMEDTOKEN_GEN_ALL_CORE_FT
aspirin 81 mg oral delayed release tablet: 1 tab(s) orally once a day  atenolol 25 mg oral tablet: 0.5 tab(s) orally once a day  atorvastatin 40 mg oral tablet: 1 tab(s) orally once a day (at bedtime)  Eliquis 2.5 mg oral tablet: 1 tab(s) orally 2 times a day  enalapril 2.5 mg oral tablet: 1 tab(s) orally 2 times a day 1 tablet in the morning and 2 tablets in the evening  escitalopram 10 mg oral tablet: 1.5 tab(s) orally once a day   acetaminophen 325 mg oral tablet: 2 tab(s) orally every 6 hours As needed Mild Pain (1 - 3)  aspirin 81 mg oral delayed release tablet: 1 tab(s) orally once a day  atenolol 25 mg oral tablet: 0.5 tab(s) orally once a day  atorvastatin 40 mg oral tablet: 1 tab(s) orally once a day (at bedtime)  Eliquis 2.5 mg oral tablet: 1 tab(s) orally 2 times a day  enalapril 2.5 mg oral tablet: 1 tab(s) orally 2 times a day 1 tablet in the morning and 2 tablets in the evening  escitalopram 10 mg oral tablet: 1.5 tab(s) orally once a day  guaiFENesin 100 mg/5 mL oral liquid: 10 milliliter(s) orally every 6 hours as needed for Cough  predniSONE 20 mg oral tablet: 1 tab(s) orally once a day   acetaminophen 325 mg oral tablet: 2 tab(s) orally every 6 hours As needed Mild Pain (1 - 3)  aspirin 81 mg oral delayed release tablet: 1 tab(s) orally once a day  atenolol 25 mg oral tablet: 0.5 tab(s) orally once a day  atorvastatin 40 mg oral tablet: 1 tab(s) orally once a day (at bedtime)  Eliquis 2.5 mg oral tablet: 1 tab(s) orally 2 times a day  enalapril 2.5 mg oral tablet: 1 tab(s) orally 2 times a day 1 tablet in the morning and 2 tablets in the evening  escitalopram 10 mg oral tablet: 1.5 tab(s) orally once a day  guaiFENesin 100 mg/5 mL oral liquid: 10 milliliter(s) orally every 6 hours as needed for Cough  Outpatient Physical Therapy: Evaluate and treat  predniSONE 20 mg oral tablet: 1 tab(s) orally once a day

## 2024-09-26 NOTE — DIETITIAN INITIAL EVALUATION ADULT - PERTINENT LABORATORY DATA
09-25    136  |  102  |  12  ----------------------------<  104[H]  3.9   |  22  |  0.55    Ca    9.8      25 Sep 2024 06:55  Phos  2.3     09-25  Mg     1.7     09-25    TPro  7.0  /  Alb  4.0  /  TBili  0.6  /  DBili  x   /  AST  21  /  ALT  24  /  AlkPhos  75  09-25  A1C with Estimated Average Glucose Result: 5.6 % (09-23-24 @ 07:39)  A1C with Estimated Average Glucose Result: 5.5 % (06-20-24 @ 06:42)

## 2024-09-26 NOTE — DISCHARGE NOTE PROVIDER - NSDCCPCAREPLAN_GEN_ALL_CORE_FT
PRINCIPAL DISCHARGE DIAGNOSIS  Diagnosis: 2019 novel coronavirus disease (COVID-19)  Assessment and Plan of Treatment: s/p remdesivir   stable on r/a      SECONDARY DISCHARGE DIAGNOSES  Diagnosis: Primary thrombocytosis  Assessment and Plan of Treatment:      PRINCIPAL DISCHARGE DIAGNOSIS  Diagnosis: 2019 novel coronavirus disease (COVID-19)  Assessment and Plan of Treatment: - Supportive care s/p Remdesivir 3 day course   -stable on room air  -continue with oral prednisone for 3 days after discharge  -Follow up with PCP      SECONDARY DISCHARGE DIAGNOSES  Diagnosis: Dizziness  Assessment and Plan of Treatment: You presented with dizziness and near syncope likely secondary COVID. Cardiac workup unremarkable. MRI head neg for acute CVA. Orthostatics negative.  - Recent TTE with normal LV function, no sig valvular disease. No need to repeat.  - Recent Carotid dopplers with no significant stenosis  - Prior CT Cors in 2022 with minimal nonobstructive CAD  -EKG with NSR, narrow QRS  - Recommend outpatient ECG monitoring (Holter/MCOT or ILR) with her usual cardiology team.

## 2024-09-26 NOTE — DISCHARGE NOTE PROVIDER - PROVIDER TOKENS
PROVIDER:[TOKEN:[[id number]]] PROVIDER:[TOKEN:[[id number]],FOLLOWUP:[1 week],ESTABLISHEDPATIENT:[T]]

## 2024-09-26 NOTE — DIETITIAN INITIAL EVALUATION ADULT - ENERGY INTAKE
Pt reports poor appetite and PO intakes since hospital admission; pt requesting soft food  and cold items

## 2024-09-26 NOTE — DIETITIAN INITIAL EVALUATION ADULT - PROBLEM SELECTOR PLAN 1
similar episode in June, evaluated by neurology, TTE - EF 50% with a moderately dilated left atrium, MRI brain  R centrum semiovale infarct, when ASA 81mg was started.  Stroke protocol with negative CT imaging  - Neurology consult appreciated  - will check MRI brain w/o contrast to further evaluate to r/o TIA/CVA  - will continue ASA 81 and Eliquis 2.5mg bid  - will risk stratify with FLP, TSH and A1C

## 2024-09-26 NOTE — DIETITIAN INITIAL EVALUATION ADULT - OTHER INFO
Nutrition-Related Concerns:  - COVID +, on remdesivir    - Hypophosphatemia, ordered for PHOS-NaK for repletion         Weights:  - Source: Patient  - UBW: 110-112 pounds   - Reported weight changes: Denies any recent changes     Current Admission Weights:  - Dosing weight:  50.9 kg/ 112.2 pounds  (9/24/24)  - Daily weight: 52.3 kg/115.3 pounds (9/25/24)        [facility]  - 52.7 kg (6/19/24), 54.1 kg (8/27/24)    Weight Change:  - No changes from pt's reported UBW, will continue monitor weight status as able     IBW:  125  pounds   %IBW: 92%

## 2024-09-26 NOTE — DISCHARGE NOTE PROVIDER - NSDCFUADDAPPT_GEN_ALL_CORE_FT
APPTS ARE READY TO BE MADE: [x ] YES    Best Family or Patient Contact (if needed):    Additional Information about above appointments (if needed):    1:   2:   3:     Other comments or requests:    APPTS ARE READY TO BE MADE: [x ] YES    Best Family or Patient Contact (if needed):    Additional Information about above appointments (if needed):    1: Follow up with PCP and cardiologist      Other comments or requests:    APPTS ARE READY TO BE MADE: [x ] YES    Best Family or Patient Contact (if needed):    Additional Information about above appointments (if needed):    1: Follow up with PCP and cardiologist      Other comments or requests:       Patient informed us they already have secured a follow up appointments which is not visible on [facility].

## 2024-09-26 NOTE — DIETITIAN INITIAL EVALUATION ADULT - REASON INDICATOR FOR ASSESSMENT
MST Score 2 or >   Information obtained from: Review of pt's current medical record, interview with pt in her assigned room on [facility]

## 2024-09-26 NOTE — DIETITIAN INITIAL EVALUATION ADULT - PERTINENT MEDS FT
MEDICATIONS  (STANDING):  apixaban 2.5 milliGRAM(s) Oral every 12 hours  aspirin enteric coated 81 milliGRAM(s) Oral daily  atenolol  Tablet 12.5 milliGRAM(s) Oral daily  atorvastatin 40 milliGRAM(s) Oral at bedtime  chlorhexidine 2% Cloths 1 Application(s) Topical daily  enalapril 2.5 milliGRAM(s) Oral <User Schedule>  enalapril 5 milliGRAM(s) Oral <User Schedule>  escitalopram 15 milliGRAM(s) Oral daily  influenza  Vaccine (HIGH DOSE) 0.5 milliLiter(s) IntraMuscular once  potassium phosphate / sodium phosphate Powder (PHOS-NaK) 1 Packet(s) Oral three times a day  remdesivir  IVPB 100 milliGRAM(s) IV Intermittent every 24 hours  remdesivir  IVPB   IV Intermittent     MEDICATIONS  (PRN):  guaiFENesin Oral Liquid (Sugar-Free) 200 milliGRAM(s) Oral every 6 hours PRN Cough

## 2024-09-26 NOTE — DIETITIAN INITIAL EVALUATION ADULT - NS FNS DIET ORDER
Diet, Regular:   DASH/TLC {Sodium & Cholesterol Restricted} (DASH) (09-22-24 @ 23:38) [Active]

## 2024-09-26 NOTE — DIETITIAN INITIAL EVALUATION ADULT - ADD RECOMMEND
1. Recommend diet liberalization to no therapeutic dietary restrictions to optimize PO intakes  2. Recommend adding Ensure Plus High Protein 1x/Day to augment PO intakes  3. Consider adding a multivitamin 1x/Day if no medical contraindications   4. Encourage adequate PO intakes as tolerated. Honor food preferences as appropriate and available.   5. Monitor PO intake, GI tolerance, skin integrity and labs. RD remains available if needed, pt is aware.   6. Malnutrition Sticker placed in pt. chart

## 2024-09-26 NOTE — DIETITIAN INITIAL EVALUATION ADULT - NSFNSGIIOFT_GEN_A_CORE
09-25-24 @ 07:01  -  09-26-24 @ 07:00  --------------------------------------------------------  OUT:    Stool (mL): 1 mL  Total OUT: 1 mL    Total NET: -1 mL

## 2024-09-26 NOTE — DISCHARGE NOTE PROVIDER - DETAILS OF MALNUTRITION DIAGNOSIS/DIAGNOSES
This patient has been assessed with a concern for Malnutrition and was treated during this hospitalization for the following Nutrition diagnosis/diagnoses:     -  09/26/2024: Severe protein-calorie malnutrition   -  09/26/2024: Underweight (BMI < 19)

## 2024-09-27 ENCOUNTER — TRANSCRIPTION ENCOUNTER (OUTPATIENT)
Age: 89
End: 2024-09-27

## 2024-09-27 VITALS
TEMPERATURE: 98 F | SYSTOLIC BLOOD PRESSURE: 122 MMHG | HEART RATE: 78 BPM | OXYGEN SATURATION: 94 % | DIASTOLIC BLOOD PRESSURE: 78 MMHG | RESPIRATION RATE: 18 BRPM

## 2024-09-27 PROCEDURE — 87635 SARS-COV-2 COVID-19 AMP PRB: CPT

## 2024-09-27 PROCEDURE — 83735 ASSAY OF MAGNESIUM: CPT

## 2024-09-27 PROCEDURE — 36415 COLL VENOUS BLD VENIPUNCTURE: CPT

## 2024-09-27 PROCEDURE — 82330 ASSAY OF CALCIUM: CPT

## 2024-09-27 PROCEDURE — 82962 GLUCOSE BLOOD TEST: CPT

## 2024-09-27 PROCEDURE — 71045 X-RAY EXAM CHEST 1 VIEW: CPT

## 2024-09-27 PROCEDURE — 84132 ASSAY OF SERUM POTASSIUM: CPT

## 2024-09-27 PROCEDURE — 85014 HEMATOCRIT: CPT

## 2024-09-27 PROCEDURE — 83036 HEMOGLOBIN GLYCOSYLATED A1C: CPT

## 2024-09-27 PROCEDURE — 82803 BLOOD GASES ANY COMBINATION: CPT

## 2024-09-27 PROCEDURE — 70496 CT ANGIOGRAPHY HEAD: CPT | Mod: MC

## 2024-09-27 PROCEDURE — 97116 GAIT TRAINING THERAPY: CPT

## 2024-09-27 PROCEDURE — 80061 LIPID PANEL: CPT

## 2024-09-27 PROCEDURE — 0042T: CPT | Mod: MC

## 2024-09-27 PROCEDURE — 85018 HEMOGLOBIN: CPT

## 2024-09-27 PROCEDURE — 99285 EMERGENCY DEPT VISIT HI MDM: CPT

## 2024-09-27 PROCEDURE — 0225U NFCT DS DNA&RNA 21 SARSCOV2: CPT

## 2024-09-27 PROCEDURE — 85027 COMPLETE CBC AUTOMATED: CPT

## 2024-09-27 PROCEDURE — 82947 ASSAY GLUCOSE BLOOD QUANT: CPT

## 2024-09-27 PROCEDURE — 84443 ASSAY THYROID STIM HORMONE: CPT

## 2024-09-27 PROCEDURE — 85025 COMPLETE CBC W/AUTO DIFF WBC: CPT

## 2024-09-27 PROCEDURE — 84100 ASSAY OF PHOSPHORUS: CPT

## 2024-09-27 PROCEDURE — 85610 PROTHROMBIN TIME: CPT

## 2024-09-27 PROCEDURE — 80053 COMPREHEN METABOLIC PANEL: CPT

## 2024-09-27 PROCEDURE — 70450 CT HEAD/BRAIN W/O DYE: CPT | Mod: MC

## 2024-09-27 PROCEDURE — 82435 ASSAY OF BLOOD CHLORIDE: CPT

## 2024-09-27 PROCEDURE — 81003 URINALYSIS AUTO W/O SCOPE: CPT

## 2024-09-27 PROCEDURE — 84484 ASSAY OF TROPONIN QUANT: CPT

## 2024-09-27 PROCEDURE — 93005 ELECTROCARDIOGRAM TRACING: CPT

## 2024-09-27 PROCEDURE — 85730 THROMBOPLASTIN TIME PARTIAL: CPT

## 2024-09-27 PROCEDURE — 85379 FIBRIN DEGRADATION QUANT: CPT

## 2024-09-27 PROCEDURE — 80048 BASIC METABOLIC PNL TOTAL CA: CPT

## 2024-09-27 PROCEDURE — 87637 SARSCOV2&INF A&B&RSV AMP PRB: CPT

## 2024-09-27 PROCEDURE — 70498 CT ANGIOGRAPHY NECK: CPT | Mod: MC

## 2024-09-27 PROCEDURE — 70551 MRI BRAIN STEM W/O DYE: CPT | Mod: MC

## 2024-09-27 PROCEDURE — 84295 ASSAY OF SERUM SODIUM: CPT

## 2024-09-27 PROCEDURE — 97161 PT EVAL LOW COMPLEX 20 MIN: CPT

## 2024-09-27 PROCEDURE — 83605 ASSAY OF LACTIC ACID: CPT

## 2024-09-27 RX ORDER — GUAIFENESIN 100 MG/5ML
10 SOLUTION ORAL
Qty: 80 | Refills: 0
Start: 2024-09-27 | End: 2024-09-28

## 2024-09-27 RX ORDER — PREDNISONE 5 MG/1
1 TABLET ORAL
Qty: 3 | Refills: 0
Start: 2024-09-27 | End: 2024-09-29

## 2024-09-27 RX ORDER — ACETAMINOPHEN 325 MG
2 TABLET ORAL
Qty: 0 | Refills: 0 | DISCHARGE
Start: 2024-09-27

## 2024-09-27 RX ADMIN — Medication 5 MILLIGRAM(S): at 17:52

## 2024-09-27 RX ADMIN — APIXABAN 2.5 MILLIGRAM(S): 5 TABLET, FILM COATED ORAL at 17:52

## 2024-09-27 RX ADMIN — Medication 81 MILLIGRAM(S): at 11:15

## 2024-09-27 RX ADMIN — Medication 650 MILLIGRAM(S): at 06:00

## 2024-09-27 RX ADMIN — CHLORHEXIDINE GLUCONATE ORAL RINSE 1 APPLICATION(S): 1.2 SOLUTION DENTAL at 11:19

## 2024-09-27 RX ADMIN — APIXABAN 2.5 MILLIGRAM(S): 5 TABLET, FILM COATED ORAL at 05:21

## 2024-09-27 RX ADMIN — ATENOLOL 12.5 MILLIGRAM(S): 25 TABLET ORAL at 05:21

## 2024-09-27 RX ADMIN — Medication 15 MILLIGRAM(S): at 11:15

## 2024-09-27 RX ADMIN — Medication 2.5 MILLIGRAM(S): at 05:20

## 2024-09-27 RX ADMIN — GUAIFENESIN 200 MILLIGRAM(S): 100 SOLUTION ORAL at 05:20

## 2024-09-27 RX ADMIN — METHYLPREDNISOLONE ACETATE 20 MILLIGRAM(S): 80 INJECTION, SUSPENSION INTRA-ARTICULAR; INTRALESIONAL; INTRAMUSCULAR; SOFT TISSUE at 05:21

## 2024-09-27 RX ADMIN — Medication 650 MILLIGRAM(S): at 12:34

## 2024-09-27 RX ADMIN — Medication 650 MILLIGRAM(S): at 05:20

## 2024-09-27 RX ADMIN — Medication 650 MILLIGRAM(S): at 11:15

## 2024-09-27 NOTE — PROGRESS NOTE ADULT - PROBLEM SELECTOR PROBLEM 6
History of thrombocytosis
Anxiety and depression

## 2024-09-27 NOTE — PROGRESS NOTE ADULT - PROBLEM SELECTOR PLAN 7
- will continue Lexapro
already on Eliquis
- will continue Lexapro

## 2024-09-27 NOTE — PROGRESS NOTE ADULT - PROBLEM SELECTOR PLAN 4
- will continue statin
- will continue Eliquis 2.5mg bid
- will continue statin

## 2024-09-27 NOTE — PROGRESS NOTE ADULT - PROBLEM SELECTOR PLAN 5
followed with Heme (Dr. Bowman)   - patient was placed on Eliquis long term for unprovoked PE in Nov 2022 in the setting of thrombocytosis (600-700).  ASA was added in June 2024 for "mini stroke" during hospitalization for similar symptom of dizziness, MRI brain at that time showed centrum semiovale infarct,  will discussed with al consult attendings re increased dosage
continue Eliquis 2.5mg bid
- will continue Eliquis 2.5mg bid

## 2024-09-27 NOTE — PROGRESS NOTE ADULT - SUBJECTIVE AND OBJECTIVE BOX
C A R D I O L O G Y  **********************************     DATE OF SERVICE: 09-25-24    Patient tested +COVID, noted on room air. No reported chest pain or SOB.  Review of symptoms otherwise negative.    MEDICATIONS:  apixaban 2.5 milliGRAM(s) Oral every 12 hours  aspirin enteric coated 81 milliGRAM(s) Oral daily  atenolol  Tablet 12.5 milliGRAM(s) Oral daily  atorvastatin 40 milliGRAM(s) Oral at bedtime  enalapril 2.5 milliGRAM(s) Oral <User Schedule>  enalapril 5 milliGRAM(s) Oral <User Schedule>  escitalopram 15 milliGRAM(s) Oral daily  guaiFENesin Oral Liquid (Sugar-Free) 200 milliGRAM(s) Oral every 6 hours PRN  influenza  Vaccine (HIGH DOSE) 0.5 milliLiter(s) IntraMuscular once  remdesivir  IVPB 100 milliGRAM(s) IV Intermittent every 24 hours  remdesivir  IVPB   IV Intermittent       LABS:                        12.6   6.57  )-----------( 540      ( 25 Sep 2024 06:54 )             39.2       Hemoglobin: 12.6 g/dL (09-25 @ 06:54)  Hemoglobin: 12.8 g/dL (09-23 @ 07:39)  Hemoglobin: 13.6 g/dL (09-22 @ 17:00)      09-25    136  |  102  |  12  ----------------------------<  104[H]  3.9   |  22  |  0.55    Ca    9.8      25 Sep 2024 06:55  Phos  2.3     09-25  Mg     1.7     09-25    TPro  7.0  /  Alb  4.0  /  TBili  0.6  /  DBili  x   /  AST  21  /  ALT  24  /  AlkPhos  75  09-25    Creatinine Trend: 0.55<--, 0.61<--, 0.57<--, 0.67<--    COAGS:           PHYSICAL EXAM:  T(C): 36.6 (09-25-24 @ 12:15), Max: 37.7 (09-24-24 @ 17:17)  HR: 68 (09-25-24 @ 12:15) (68 - 83)  BP: 110/72 (09-25-24 @ 12:15) (110/72 - 160/81)  RR: 18 (09-25-24 @ 05:55) (18 - 18)  SpO2: 94% (09-25-24 @ 12:15) (92% - 97%)  Wt(kg): --    I&O's Summary    24 Sep 2024 07:01  -  25 Sep 2024 07:00  --------------------------------------------------------  IN: 0 mL / OUT: 2 mL / NET: -2 mL          Gen: NAD  HEENT:  (-)icterus (-)pallor  CV: N S1 S2 1/6 KARIS (+)2 Pulses B/l  Resp:  Clear to auscultation B/L, normal effort  GI: (+) BS Soft, NT, ND  Lymph:  (-)Edema, (-)obvious lymphadenopathy  Skin: Warm to touch, Normal turgor  Psych: Appropriate mood and affect      TELEMETRY: NSR, brief PAT  ECG: NSR, LVH with repolarization abnormality - unchanged compared to prior EKGs     ASSESSMENT/PLAN: Patient is an 88 year-old female with PMH of HTN, HLD, thrombocytosis with unprovoked PE in 2022 (on Eliquis), TIA (on asa), cholecystectomy, recent diverticulitis who presented as a code stroke for dizziness and unsteady gait. MRI negative for acute CVA. Cardiology consulted for further evaluation.    #Near Syncope  - MRI neg for acute CVA  - Neuro follow up  - Orthostatics negative  - Possibly related to SBP 200s upon arrival  - TSH WNL  - Recent TTE with normal LV function, no sig valvular disease. No need to repeat  - Recent Carotid dopplers with no sig stenosis  - Prior CT Cors in 2022 with minimal nonobstructive CAD  - Monitor telemetry, stable in NSR. EKG with NSR, narrow QRS  - EP consult appreciated - Recommend outpatient ECG monitoring (Holter/MCOT or ILR) with her usual cardiology team.    #HTN  - Trend BP, now improved  - Continue Enalapril 2.5mg AM and 5mg PM  - Continue Atenolol 12.5mg daily    #Hx PE  - Continue Eliquis per med    #Cough  - +COVID  - Supportive care and Remdesivir per med    - No further inpatient cardiac w/u planned  - Patient to f/u with her cardiologist Dr. Robert Loomis after discharge    Felix Moreno PA-C  Pager: [phone]      
C A R D I O L O G Y  **********************************     DATE OF SERVICE: 09-26-24    Patient remains on room air. On isolation for covid. No reported chest pain or shortness of breath.   Review of symptoms otherwise negative.    MEDICATIONS:  apixaban 2.5 milliGRAM(s) Oral every 12 hours  aspirin enteric coated 81 milliGRAM(s) Oral daily  atenolol  Tablet 12.5 milliGRAM(s) Oral daily  atorvastatin 40 milliGRAM(s) Oral at bedtime  chlorhexidine 2% Cloths 1 Application(s) Topical daily  enalapril 2.5 milliGRAM(s) Oral <User Schedule>  enalapril 5 milliGRAM(s) Oral <User Schedule>  escitalopram 15 milliGRAM(s) Oral daily  guaiFENesin Oral Liquid (Sugar-Free) 200 milliGRAM(s) Oral every 6 hours PRN  influenza  Vaccine (HIGH DOSE) 0.5 milliLiter(s) IntraMuscular once  potassium phosphate / sodium phosphate Powder (PHOS-NaK) 1 Packet(s) Oral three times a day  remdesivir  IVPB   IV Intermittent   remdesivir  IVPB 100 milliGRAM(s) IV Intermittent every 24 hours      LABS:                        12.6   6.57  )-----------( 540      ( 25 Sep 2024 06:54 )             39.2       Hemoglobin: 12.6 g/dL (09-25 @ 06:54)  Hemoglobin: 12.8 g/dL (09-23 @ 07:39)  Hemoglobin: 13.6 g/dL (09-22 @ 17:00)      09-25    136  |  102  |  12  ----------------------------<  104[H]  3.9   |  22  |  0.55    Ca    9.8      25 Sep 2024 06:55  Phos  2.3     09-25  Mg     1.7     09-25    TPro  7.0  /  Alb  4.0  /  TBili  0.6  /  DBili  x   /  AST  21  /  ALT  24  /  AlkPhos  75  09-25    Creatinine Trend: 0.55<--, 0.61<--, 0.57<--    COAGS:         Per Chart  PHYSICAL EXAM:  T(C): 36.9 (09-26-24 @ 09:14), Max: 37 (09-26-24 @ 00:58)  HR: 68 (09-26-24 @ 09:14) (68 - 75)  BP: 159/86 (09-26-24 @ 09:14) (110/72 - 172/91)  RR: 18 (09-26-24 @ 09:14) (18 - 18)  SpO2: 94% (09-26-24 @ 09:14) (94% - 96%)  Wt(kg): --    I&O's Summary    25 Sep 2024 07:01  -  26 Sep 2024 07:00  --------------------------------------------------------  IN: 0 mL / OUT: 1 mL / NET: -1 mL        Gen: NAD  HEENT:  (-)icterus (-)pallor  CV: N S1 S2 1/6 KARIS (+)2 Pulses B/l  Resp:  Clear to auscultation B/L, normal effort  GI: (+) BS Soft, NT, ND  Lymph:  (-)Edema, (-)obvious lymphadenopathy  Skin: Warm to touch, Normal turgor  Psych: Appropriate mood and affect      TELEMETRY: NSR  ECG: NSR, LVH with repolarization abnormality - unchanged compared to prior EKGs     ASSESSMENT/PLAN: Patient is an 88 year-old female with PMH of HTN, HLD, thrombocytosis with unprovoked PE in 2022 (on Eliquis), TIA (on asa), cholecystectomy, recent diverticulitis who presented as a code stroke for dizziness and unsteady gait. MRI negative for acute CVA. Cardiology consulted for further evaluation.    #Near Syncope  - MRI neg for acute CVA  - Neuro follow up  - Orthostatics negative  - Possibly related to SBP 200s upon arrival  - TSH WNL  - Recent TTE with normal LV function, no sig valvular disease. No need to repeat  - Recent Carotid dopplers with no sig stenosis  - Prior CT Cors in 2022 with minimal nonobstructive CAD  - Monitor telemetry, stable in NSR. EKG with NSR, narrow QRS  - EP consult appreciated - Recommend outpatient ECG monitoring (Holter/MCOT or ILR) with her usual cardiology team.    #HTN  - Trend BP, now improved  - Continue Enalapril 2.5mg AM and 5mg PM  - Continue Atenolol 12.5mg daily    #Hx PE  - Continue Eliquis per med    #Cough  - +COVID  - Supportive care and Remdesivir per med  - Pulm consult noted    - No further inpatient cardiac w/u planned  - Patient to f/u with her cardiologist Dr. Robert Loomis after discharge    AVA StonerC  Pager: [phone]      
Date of Service: 09-26-24 @ 14:34    Patient is a 88y old  Female who presents with a chief complaint of dizziness (26 Sep 2024 13:59)      Any change in ROS: She seems OK;  no sob:  no cough  : no phlegm   has sore throat:  hoarse voice     MEDICATIONS  (STANDING):  apixaban 2.5 milliGRAM(s) Oral every 12 hours  aspirin enteric coated 81 milliGRAM(s) Oral daily  atenolol  Tablet 12.5 milliGRAM(s) Oral daily  atorvastatin 40 milliGRAM(s) Oral at bedtime  chlorhexidine 2% Cloths 1 Application(s) Topical daily  enalapril 5 milliGRAM(s) Oral <User Schedule>  enalapril 2.5 milliGRAM(s) Oral <User Schedule>  escitalopram 15 milliGRAM(s) Oral daily  influenza  Vaccine (HIGH DOSE) 0.5 milliLiter(s) IntraMuscular once  methylPREDNISolone sodium succinate Injectable 20 milliGRAM(s) IV Push daily  remdesivir  IVPB 100 milliGRAM(s) IV Intermittent every 24 hours  remdesivir  IVPB   IV Intermittent     MEDICATIONS  (PRN):  acetaminophen     Tablet .. 650 milliGRAM(s) Oral every 6 hours PRN Mild Pain (1 - 3)  benzocaine/menthol Lozenge 1 Lozenge Oral every 4 hours PRN throat pian  guaiFENesin Oral Liquid (Sugar-Free) 200 milliGRAM(s) Oral every 6 hours PRN Cough    Vital Signs Last 24 Hrs  T(C): 36.9 (26 Sep 2024 09:14), Max: 37 (26 Sep 2024 00:58)  T(F): 98.5 (26 Sep 2024 09:14), Max: 98.6 (26 Sep 2024 00:58)  HR: 68 (26 Sep 2024 09:14) (68 - 75)  BP: 159/86 (26 Sep 2024 09:14) (122/69 - 172/91)  BP(mean): --  RR: 18 (26 Sep 2024 09:14) (18 - 18)  SpO2: 94% (26 Sep 2024 09:14) (94% - 96%)    Parameters below as of 26 Sep 2024 09:14  Patient On (Oxygen Delivery Method): room air        I&O's Summary    25 Sep 2024 07:01  -  26 Sep 2024 07:00  --------------------------------------------------------  IN: 0 mL / OUT: 1 mL / NET: -1 mL          Physical Exam:   GENERAL: NAD, well-groomed, well-developed  HEENT: ANTHONY/   Atraumatic, Normocephalic  ENMT: No tonsillar erythema, exudates, or enlargement; Moist mucous membranes, Good dentition, No lesions  NECK: Supple, No JVD, Normal thyroid  CHEST/LUNG: mild coarse rhonchi +  CVS: Regular rate and rhythm; No murmurs, rubs, or gallops  GI: : Soft, Nontender, Nondistended; Bowel sounds present  NERVOUS SYSTEM:  Alert & Oriented X3  EXTREMITIES:-edema  LYMPH: No lymphadenopathy noted  SKIN: No rashes or lesions  ENDOCRINOLOGY: No Thyromegaly  PSYCH: Appropriate    Labs:  30                            12.6   6.57  )-----------( 540      ( 25 Sep 2024 06:54 )             39.2                         12.8   5.74  )-----------( 632      ( 23 Sep 2024 07:39 )             39.6                         13.6   5.68  )-----------( 700      ( 22 Sep 2024 17:00 )             42.0     09-25    136  |  102  |  12  ----------------------------<  104[H]  3.9   |  22  |  0.55  09-23    138  |  101  |  11  ----------------------------<  68[L]  3.5   |  25  |  0.61  09-22    137  |  100  |  12  ----------------------------<  108[H]  4.4   |  25  |  0.57    Ca    9.8      25 Sep 2024 06:55  Phos  2.3     09-25  Mg     1.7     09-25    TPro  7.0  /  Alb  4.0  /  TBili  0.6  /  DBili  x   /  AST  21  /  ALT  24  /  AlkPhos  75  09-25  TPro  8.1  /  Alb  4.7  /  TBili  0.6  /  DBili  x   /  AST  36  /  ALT  40  /  AlkPhos  91  09-22    CAPILLARY BLOOD GLUCOSE          LIVER FUNCTIONS - ( 25 Sep 2024 06:55 )  Alb: 4.0 g/dL / Pro: 7.0 g/dL / ALK PHOS: 75 U/L / ALT: 24 U/L / AST: 21 U/L / GGT: x             Urinalysis Basic - ( 25 Sep 2024 06:55 )    Color: x / Appearance: x / SG: x / pH: x  Gluc: 104 mg/dL / Ketone: x  / Bili: x / Urobili: x   Blood: x / Protein: x / Nitrite: x   Leuk Esterase: x / RBC: x / WBC x   Sq Epi: x / Non Sq Epi: x / Bacteria: x      D-Dimer Assay, Quantitative: 385 ng/mL DDU (09-25 @ 06:56)        RECENT CULTURES:  rad< from: Xray Chest 1 View- PORTABLE-Urgent (Xray Chest 1 View- PORTABLE-Urgent .) (09.25.24 @ 16:23) >    ACC: [id number] EXAM:  XR CHEST PORTABLE URGENT 1V   ORDERED BY: JAMESON BRAXTON     PROCEDURE DATE:  09/25/2024          INTERPRETATION:  Exam:XR CHEST URGENT    clinical history:Nausea    Heart size is normal. Lungs show no acute infiltrate. No pleural effusion.    IMPRESSION:  No active parenchymal disease in the chest.        --- End of Report ---            MANUELITO WELCH MD; Attending Radiologist  This document has been electronically signed. Sep 25 2024  8:06PM    < end of copied text >        RESPIRATORY CULTURES:          Studies  Chest X-RAY  CT SCAN Chest   Venous Dopplers: LE:   CT Abdomen  Others              
Neurology Progress Note    S: Patient seen and examined. No new events overnight. patient denied CP, SOB, HA or pain.     Medication:  apixaban 2.5 milliGRAM(s) Oral every 12 hours  aspirin enteric coated 81 milliGRAM(s) Oral daily  atenolol  Tablet 12.5 milliGRAM(s) Oral daily  atorvastatin 40 milliGRAM(s) Oral at bedtime  enalapril 5 milliGRAM(s) Oral <User Schedule>  enalapril 2.5 milliGRAM(s) Oral <User Schedule>  escitalopram 15 milliGRAM(s) Oral daily  influenza  Vaccine (HIGH DOSE) 0.5 milliLiter(s) IntraMuscular once  remdesivir  IVPB   IV Intermittent       Vitals:  Vital Signs Last 24 Hrs  T(C): 37.7 (24 Sep 2024 17:17), Max: 37.7 (24 Sep 2024 17:17)  T(F): 99.8 (24 Sep 2024 17:17), Max: 99.8 (24 Sep 2024 17:17)  HR: 83 (24 Sep 2024 17:48) (66 - 83)  BP: 149/79 (24 Sep 2024 17:48) (136/71 - 162/78)  BP(mean): --  RR: 18 (24 Sep 2024 17:17) (18 - 18)  SpO2: 92% (24 Sep 2024 17:17) (92% - 94%)    Parameters below as of 24 Sep 2024 17:17  Patient On (Oxygen Delivery Method): room air        General Exam:   General Appearance: Appropriately dressed and in no acute distress       Head: Normocephalic, atraumatic and no dysmorphic features  Ear, Nose, and Throat: Moist mucous membranes  CVS: S1S2+  Resp: No SOB, no wheeze or rhonchi  Abd: soft NTND  Extremities: No edema, no cyanosis  Skin: No bruises, no rashes     Neurological Exam:  Mental Status: Awake, alert and oriented x 3.  Able to follow simple and complex verbal commands. Able to name and repeat. fluent speech. No obvious aphasia or dysarthria noted.   Cranial Nerves: PERRL, EOMI, VFFC, sensation V1-V3 intact,  no obvious facial asymmetry , equal elevation of palate, scm/trap 5/5, tongue is midline on protrusion. no obvious papilledema on fundoscopic exam. Hearing is grossly intact.   Motor: Normal bulk, tone and strength throughout. Fine finger movements were intact and symmetric. no tremors or drift noted.    Sensation: Intact to light touch and pinprick throughout. no right/left confusion. no extinction to tactile on DSS. Romberg was negative.   Reflexes: 1+ throughout at biceps, brachioradialis, triceps, patellars and ankles bilaterally and equal. No clonus. R toe and L toe were both downgoing.  Coordination: No dysmetria on FNF or HKS  Gait: Narrow based and steady. Able to tandem. no limitations in gait.     I personally reviewed the below data/images/labs:      CBC Full  -  ( 23 Sep 2024 07:39 )  WBC Count : 5.74 K/uL  RBC Count : 4.32 M/uL  Hemoglobin : 12.8 g/dL  Hematocrit : 39.6 %  Platelet Count - Automated : 632 K/uL  Mean Cell Volume : 91.7 fl  Mean Cell Hemoglobin : 29.6 pg  Mean Cell Hemoglobin Concentration : 32.3 gm/dL  Auto Neutrophil # : 3.60 K/uL  Auto Lymphocyte # : 1.30 K/uL  Auto Monocyte # : 0.67 K/uL  Auto Eosinophil # : 0.13 K/uL  Auto Basophil # : 0.03 K/uL  Auto Neutrophil % : 62.7 %  Auto Lymphocyte % : 22.6 %  Auto Monocyte % : 11.7 %  Auto Eosinophil % : 2.3 %  Auto Basophil % : 0.5 %    09-23    138  |  101  |  11  ----------------------------<  68[L]  3.5   |  25  |  0.61    Ca    10.0      23 Sep 2024 07:39          Urinalysis Basic - ( 23 Sep 2024 07:39 )    Color: x / Appearance: x / SG: x / pH: x  Gluc: 68 mg/dL / Ketone: x  / Bili: x / Urobili: x   Blood: x / Protein: x / Nitrite: x   Leuk Esterase: x / RBC: x / WBC x   Sq Epi: x / Non Sq Epi: x / Bacteria: x      < from: CT Brain Stroke Protocol (09.22.24 @ 17:04) >  IMPRESSION:  No acute intracranial hemorrhage, large acute territorial infarct, or   mass effect.    < end of copied text >  < from: CT Angio Brain Stroke Protocol  w/ IV Cont (09.22.24 @ 17:09) >  IMPRESSION:    CT PERFUSION:  Technical limitations: None.    Core infarction: 0 ml  Penumbra / tissue at risk for active ischemia: 0 ml    CTA NECK:  No evidence of significant stenosis or occlusion.    CTA HEAD:  No large vessel occlusion, significant stenosis or vascular abnormality   identified.    < end of copied text >     < from: MR Head No Cont (09.23.24 @ 10:16) >    ACC: [id number] EXAM:  MR BRAIN   ORDERED BY: CLARISSA LOREDO     PROCEDURE DATE:  09/23/2024          INTERPRETATION:  INDICATIONS:  Transient ischemic attack, polycythemia,   pulmonary embolism, gait unsteadiness. Evaluate for CVA.    TECHNIQUE:  Multiplanar imaging of the brain was performed using T1   weighted, T2 weighted and FLAIR sequences.  Diffusion weighted and   susceptibility sensitive images were also obtained.    COMPARISON EXAMINATION:  CT head 9/22/2024, MRI brain 6/18/2024    FINDINGS:    No evidence of acute ischemia. Focus of diffusion restriction the right   centrum semiovale seen on prior MRI dated 6/18/2024 has resolved   consistent with normal infarct evolution. Mild associated focal   encephalomalacia and gliosis.    High  FLAIR signal the bilateral periventricular and subcortical white   matter compatible with chronic microvascular ischemic changes, not   significantly changed.    No mass, edema, or acute hemorrhage. Tiny focus of decreased   susceptibility weighted signal in the right parietal lobe compatible with   chronic microhemorrhage.    Mild generalized age-related cerebral volume loss. No hydrocephalus.    Major intracranial vascular flow voids are preserved.    The paranasal sinuses and tympanomastoid cavities are clear.    Bilateral lens replacement.    IMPRESSION:    No evidence of acute ischemia. Focus of diffusion restriction in the   right centrum semiovale has normally evolved since 6/18/2024.  No acute hemorrhage, mass, or brain edema.  Mild age-related cerebral volume loss and ischemic gliotic changes,   similar to prior exam on 6/18/2024.    --- End of Report ---          NEFTALI SHAW MD; Resident Radiologist  This document has been electronically signed.  SANYA STOVALL MD; Attending Radiologist  This document has been electronically signed. Sep 23 2024 11:13AM    < end of copied text >  
C A R D I O L O G Y  **********************************     DATE OF SERVICE: 09-24-24    Patient c/o cough. denies further dizziness, chest pain or shortness of breath.   Review of systems otherwise negative.  	  MEDICATIONS:  MEDICATIONS  (STANDING):  apixaban 2.5 milliGRAM(s) Oral every 12 hours  aspirin enteric coated 81 milliGRAM(s) Oral daily  atenolol  Tablet 12.5 milliGRAM(s) Oral daily  atorvastatin 40 milliGRAM(s) Oral at bedtime  enalapril 2.5 milliGRAM(s) Oral <User Schedule>  enalapril 5 milliGRAM(s) Oral <User Schedule>  escitalopram 15 milliGRAM(s) Oral daily  influenza  Vaccine (HIGH DOSE) 0.5 milliLiter(s) IntraMuscular once      LABS:	 	    CARDIAC MARKERS:                                12.8   5.74  )-----------( 632      ( 23 Sep 2024 07:39 )             39.6     Hemoglobin: 12.8 g/dL (09-23 @ 07:39)  Hemoglobin: 13.6 g/dL (09-22 @ 17:00)      09-23    138  |  101  |  11  ----------------------------<  68[L]  3.5   |  25  |  0.61    Ca    10.0      23 Sep 2024 07:39    TPro  8.1  /  Alb  4.7  /  TBili  0.6  /  DBili  x   /  AST  36  /  ALT  40  /  AlkPhos  91  09-22    Creatinine Trend: 0.61<--, 0.57<--, 0.67<--    COAGS:       proBNP:   Lipid Profile:   HgA1c:   TSH:       PHYSICAL EXAM:  T(C): 36.8 (09-24-24 @ 07:50), Max: 37.8 (09-23-24 @ 17:16)  HR: 75 (09-24-24 @ 11:40) (61 - 75)  BP: 136/71 (09-24-24 @ 11:40) (136/71 - 166/78)  RR: 18 (09-24-24 @ 07:50) (18 - 18)  SpO2: 94% (09-24-24 @ 11:40) (92% - 96%)  Wt(kg): --  I&O's Summary    23 Sep 2024 07:01  -  24 Sep 2024 07:00  --------------------------------------------------------  IN: 250 mL / OUT: 0 mL / NET: 250 mL          Gen: NAD  HEENT:  (-)icterus (-)pallor  CV: N S1 S2 1/6 KARIS (+)2 Pulses B/l  Resp:  Clear to auscultation B/L, normal effort  GI: (+) BS Soft, NT, ND  Lymph:  (-)Edema, (-)obvious lymphadenopathy  Skin: Warm to touch, Normal turgor  Psych: Appropriate mood and affect      TELEMETRY: NSR    ECG: NSR, LVH with repolarization abnormality - unchanged compared to prior EKGs     ASSESSMENT/PLAN: Patient is an 88 year-old female with PMH of HTN, HLD, thrombocytosis with unprovoked PE in 2022 (on Eliquis), TIA (on asa), cholecystectomy, recent diverticulitis who presented as a code stroke for dizziness and unsteady gait. MRI negative for acute CVA. Cardiology consulted for further evaluation.    #Near Syncope  - MRI neg for acute CVA  - Neuro follow up  - Orthostatics negative  - Possibly related to SBP 200s upon arrival  - TSH WNL  - Recent TTE with normal LV function, no sig valvular disease. No need to repeat  - Recent Carotid dopplers with no sig stenosis  - Prior CT Cors in 2022 with minimal nonobstructive CAD  - Monitor telemetry, stable in NSR. EKG with NSR, narrow QRS  - EP consult appreciated - Recommend outpatient ECG monitoring (Holter/MCOT or ILR) with her usual cardiology team.    #HTN  - Trend BP, now improved  - Continue Enalapril 2.5mg AM and 5mg PM  - Continue Atenolol 12.5mg daily    #Hx PE  - Continue Eliquis per med    #Cough  - F/u RVP/Covid swab    - No further inpatient cardiac w/u planned  - Patient to f/u with her cardiologist Dr. Robert Loomis after discharge    Felix Moreno PA-C  Pager: [phone]      
C A R D I O L O G Y  **********************************     DATE OF SERVICE: 09-27-24    Patient remains on isolation for covid. Telemetry with no events. Stable on room air. No reported chest pain or shortness of breath.   Review of symptoms otherwise negative.    MEDICATIONS:  acetaminophen     Tablet .. 650 milliGRAM(s) Oral every 6 hours PRN  apixaban 2.5 milliGRAM(s) Oral every 12 hours  aspirin enteric coated 81 milliGRAM(s) Oral daily  atenolol  Tablet 12.5 milliGRAM(s) Oral daily  atorvastatin 40 milliGRAM(s) Oral at bedtime  benzocaine/menthol Lozenge 1 Lozenge Oral every 4 hours PRN  chlorhexidine 2% Cloths 1 Application(s) Topical daily  enalapril 2.5 milliGRAM(s) Oral <User Schedule>  enalapril 5 milliGRAM(s) Oral <User Schedule>  escitalopram 15 milliGRAM(s) Oral daily  guaiFENesin Oral Liquid (Sugar-Free) 200 milliGRAM(s) Oral every 6 hours PRN  influenza  Vaccine (HIGH DOSE) 0.5 milliLiter(s) IntraMuscular once  methylPREDNISolone sodium succinate Injectable 20 milliGRAM(s) IV Push daily      LABS:      Hemoglobin: 12.6 g/dL (09-25 @ 06:54)  Hemoglobin: 12.8 g/dL (09-23 @ 07:39)  Hemoglobin: 13.6 g/dL (09-22 @ 17:00)            Creatinine Trend: 0.55<--, 0.61<--, 0.57<--    COAGS:         Per Chart  PHYSICAL EXAM:  T(C): 37.2 (09-26-24 @ 23:42), Max: 37.2 (09-26-24 @ 23:42)  HR: 59 (09-27-24 @ 05:23) (59 - 86)  BP: 135/78 (09-27-24 @ 05:23) (119/69 - 138/79)  RR: 18 (09-26-24 @ 23:42) (18 - 18)  SpO2: 94% (09-26-24 @ 23:42) (94% - 94%)  Wt(kg): --    I&O's Summary    26 Sep 2024 07:01  -  27 Sep 2024 07:00  --------------------------------------------------------  IN: 340 mL / OUT: 0 mL / NET: 340 mL          Gen: NAD  HEENT:  (-)icterus (-)pallor  CV: N S1 S2 1/6 KARIS (+)2 Pulses B/l  Resp:  Clear to auscultation B/L, normal effort  GI: (+) BS Soft, NT, ND  Lymph:  (-)Edema, (-)obvious lymphadenopathy  Skin: Warm to touch, Normal turgor  Psych: Appropriate mood and affect      TELEMETRY: SB/SR 50-70s  ECG: NSR, LVH with repolarization abnormality - unchanged compared to prior EKGs     ASSESSMENT/PLAN: Patient is an 88 year-old female with PMH of HTN, HLD, thrombocytosis with unprovoked PE in 2022 (on Eliquis), TIA (on asa), cholecystectomy, recent diverticulitis who presented as a code stroke for dizziness and unsteady gait. MRI negative for acute CVA. Cardiology consulted for further evaluation.    #Near Syncope  - MRI neg for acute CVA  - Neuro follow up  - Orthostatics negative  - Possibly related to SBP 200s upon arrival  - TSH WNL  - Recent TTE with normal LV function, no sig valvular disease. No need to repeat  - Recent Carotid dopplers with no sig stenosis  - Prior CT Cors in 2022 with minimal nonobstructive CAD  - Monitor telemetry, stable in NSR. EKG with NSR, narrow QRS  - EP consult appreciated - Recommend outpatient ECG monitoring (Holter/MCOT or ILR) with her usual cardiology team.    #HTN  - Trend BP, now improved  - Continue Enalapril 2.5mg AM and 5mg PM  - Continue Atenolol 12.5mg daily    #Hx PE  - Continue Eliquis per med    #Cough  - +COVID  - Supportive care and Remdesivir per med  - Pulm eval noted    - No further inpatient cardiac w/u planned  - Patient to f/u with her cardiologist Dr. Robert Loomis after discharge    Felix Moreno PA-C  Pager: [phone]      
Date of Service: 09-27-24 @ 14:44    Patient is a 88y old  Female who presents with a chief complaint of dizziness (27 Sep 2024 13:34)      Any change in ROS: seems OK:  no sob:  on room air     MEDICATIONS  (STANDING):  apixaban 2.5 milliGRAM(s) Oral every 12 hours  aspirin enteric coated 81 milliGRAM(s) Oral daily  atenolol  Tablet 12.5 milliGRAM(s) Oral daily  atorvastatin 40 milliGRAM(s) Oral at bedtime  chlorhexidine 2% Cloths 1 Application(s) Topical daily  enalapril 2.5 milliGRAM(s) Oral <User Schedule>  enalapril 5 milliGRAM(s) Oral <User Schedule>  escitalopram 15 milliGRAM(s) Oral daily  influenza  Vaccine (HIGH DOSE) 0.5 milliLiter(s) IntraMuscular once  methylPREDNISolone sodium succinate Injectable 20 milliGRAM(s) IV Push daily    MEDICATIONS  (PRN):  acetaminophen     Tablet .. 650 milliGRAM(s) Oral every 6 hours PRN Mild Pain (1 - 3)  benzocaine/menthol Lozenge 1 Lozenge Oral every 4 hours PRN throat pian  guaiFENesin Oral Liquid (Sugar-Free) 200 milliGRAM(s) Oral every 6 hours PRN Cough    Vital Signs Last 24 Hrs  T(C): 36.4 (27 Sep 2024 13:25), Max: 37.2 (26 Sep 2024 23:42)  T(F): 97.6 (27 Sep 2024 13:25), Max: 98.9 (26 Sep 2024 23:42)  HR: 65 (27 Sep 2024 13:25) (59 - 86)  BP: 124/69 (27 Sep 2024 13:25) (119/69 - 138/79)  BP(mean): --  RR: 18 (27 Sep 2024 13:25) (18 - 18)  SpO2: 93% (27 Sep 2024 13:25) (93% - 94%)    Parameters below as of 27 Sep 2024 13:25  Patient On (Oxygen Delivery Method): room air        I&O's Summary    26 Sep 2024 07:01  -  27 Sep 2024 07:00  --------------------------------------------------------  IN: 340 mL / OUT: 0 mL / NET: 340 mL          Physical Exam:   GENERAL: NAD, well-groomed, well-developed  HEENT: ANTHONY/   Atraumatic, Normocephalic  ENMT: No tonsillar erythema, exudates, or enlargement; Moist mucous membranes, Good dentition, No lesions  NECK: Supple, No JVD, Normal thyroid  CHEST/LUNG: not much of wheezing today  CVS: Regular rate and rhythm; No murmurs, rubs, or gallops  GI: : Soft, Nontender, Nondistended; Bowel sounds present  NERVOUS SYSTEM:  Alert & Oriented X3  EXTREMITIES:  2+ Peripheral Pulses, No clubbing, cyanosis, or edema  LYMPH: No lymphadenopathy noted  SKIN: No rashes or lesions  ENDOCRINOLOGY: No Thyromegaly  PSYCH: Appropriate    Labs:  30                            12.6   6.57  )-----------( 540      ( 25 Sep 2024 06:54 )             39.2     09-25    136  |  102  |  12  ----------------------------<  104[H]  3.9   |  22  |  0.55      TPro  7.0  /  Alb  4.0  /  TBili  0.6  /  DBili  x   /  AST  21  /  ALT  24  /  AlkPhos  75  09-25    CAPILLARY BLOOD GLUCOSE        rad< from: Xray Chest 1 View- PORTABLE-Urgent (Xray Chest 1 View- PORTABLE-Urgent .) (09.25.24 @ 16:23) >  PROCEDURE DATE:  09/25/2024          INTERPRETATION:  Exam:XR CHEST URGENT    clinical history:Nausea    Heart size is normal. Lungs show no acute infiltrate. No pleural effusion.    IMPRESSION:  No active parenchymal disease in the chest.        --- End of Report ---            MANUELITO WELCH MD; Attending Radiologist  This document has been electronically signed. Sep 25 2024  8:06PM    < end of copied text >          D-Dimer Assay, Quantitative: 385 ng/mL DDU (09-25 @ 06:56)        RECENT CULTURES:        RESPIRATORY CULTURES:          Studies  Chest X-RAY  CT SCAN Chest   Venous Dopplers: LE:   CT Abdomen  Others              
Patient is a 88y old  Female who presents with a chief complaint of dizziness (24 Sep 2024 15:21)      DATE OF SERVICE: 09-25-24 @ 12:38    SUBJECTIVE / OVERNIGHT EVENTS: overnight events noted    ROS:  Resp: No cough no sputum production  CVS: No chest pain no palpitations no orthopnea  GI: no N/V/D    MEDICATIONS  (STANDING):  apixaban 2.5 milliGRAM(s) Oral every 12 hours  aspirin enteric coated 81 milliGRAM(s) Oral daily  atenolol  Tablet 12.5 milliGRAM(s) Oral daily  atorvastatin 40 milliGRAM(s) Oral at bedtime  enalapril 2.5 milliGRAM(s) Oral <User Schedule>  enalapril 5 milliGRAM(s) Oral <User Schedule>  escitalopram 15 milliGRAM(s) Oral daily  influenza  Vaccine (HIGH DOSE) 0.5 milliLiter(s) IntraMuscular once  remdesivir  IVPB 100 milliGRAM(s) IV Intermittent every 24 hours  remdesivir  IVPB   IV Intermittent     MEDICATIONS  (PRN):  guaiFENesin Oral Liquid (Sugar-Free) 200 milliGRAM(s) Oral every 6 hours PRN Cough        CAPILLARY BLOOD GLUCOSE        I&O's Summary    24 Sep 2024 07:01  -  25 Sep 2024 07:00  --------------------------------------------------------  IN: 0 mL / OUT: 2 mL / NET: -2 mL        Vital Signs Last 24 Hrs  T(C): 36.9 (25 Sep 2024 05:55), Max: 37.7 (24 Sep 2024 17:17)  T(F): 98.5 (25 Sep 2024 05:55), Max: 99.8 (24 Sep 2024 17:17)  HR: 73 (25 Sep 2024 05:55) (71 - 83)  BP: 142/80 (25 Sep 2024 05:55) (136/70 - 160/81)  BP(mean): --  RR: 18 (25 Sep 2024 05:55) (18 - 18)  SpO2: 97% (25 Sep 2024 05:55) (92% - 97%)    PHYSICAL EXAM:  CHEST/LUNG: clear   HEART: S1 S2; no murmurs  ABDOMEN: Soft, Nontender  EXTREMITIES:  no edema  NEUROLOGY: AO x 3 non-focal  SKIN: No rashes or lesions    LABS:                        12.6   6.57  )-----------( 540      ( 25 Sep 2024 06:54 )             39.2     09-25    136  |  102  |  12  ----------------------------<  104[H]  3.9   |  22  |  0.55    Ca    9.8      25 Sep 2024 06:55  Phos  2.3     09-25  Mg     1.7     09-25    TPro  7.0  /  Alb  4.0  /  TBili  0.6  /  DBili  x   /  AST  21  /  ALT  24  /  AlkPhos  75  09-25          Urinalysis Basic - ( 25 Sep 2024 06:55 )    Color: x / Appearance: x / SG: x / pH: x  Gluc: 104 mg/dL / Ketone: x  / Bili: x / Urobili: x   Blood: x / Protein: x / Nitrite: x   Leuk Esterase: x / RBC: x / WBC x   Sq Epi: x / Non Sq Epi: x / Bacteria: x          All consultant(s) notes reviewed and care discussed with other providers        Contact Number, Dr Mathew [phone]
Patient is a 88y old  Female who presents with a chief complaint of dizziness (27 Sep 2024 12:24)      DATE OF SERVICE: 09-27-24 @ 13:34    SUBJECTIVE / OVERNIGHT EVENTS: overnight events noted    ROS:  Resp: No cough no sputum production  CVS: No chest pain no palpitations no orthopnea  GI: no N/V/D  "I feel much better"         MEDICATIONS  (STANDING):  apixaban 2.5 milliGRAM(s) Oral every 12 hours  aspirin enteric coated 81 milliGRAM(s) Oral daily  atenolol  Tablet 12.5 milliGRAM(s) Oral daily  atorvastatin 40 milliGRAM(s) Oral at bedtime  chlorhexidine 2% Cloths 1 Application(s) Topical daily  enalapril 2.5 milliGRAM(s) Oral <User Schedule>  enalapril 5 milliGRAM(s) Oral <User Schedule>  escitalopram 15 milliGRAM(s) Oral daily  influenza  Vaccine (HIGH DOSE) 0.5 milliLiter(s) IntraMuscular once  methylPREDNISolone sodium succinate Injectable 20 milliGRAM(s) IV Push daily    MEDICATIONS  (PRN):  acetaminophen     Tablet .. 650 milliGRAM(s) Oral every 6 hours PRN Mild Pain (1 - 3)  benzocaine/menthol Lozenge 1 Lozenge Oral every 4 hours PRN throat pian  guaiFENesin Oral Liquid (Sugar-Free) 200 milliGRAM(s) Oral every 6 hours PRN Cough        CAPILLARY BLOOD GLUCOSE        I&O's Summary    26 Sep 2024 07:01  -  27 Sep 2024 07:00  --------------------------------------------------------  IN: 340 mL / OUT: 0 mL / NET: 340 mL        Vital Signs Last 24 Hrs  T(C): 37.2 (26 Sep 2024 23:42), Max: 37.2 (26 Sep 2024 23:42)  T(F): 98.9 (26 Sep 2024 23:42), Max: 98.9 (26 Sep 2024 23:42)  HR: 59 (27 Sep 2024 05:23) (59 - 86)  BP: 135/78 (27 Sep 2024 05:23) (119/69 - 138/79)  BP(mean): --  RR: 18 (26 Sep 2024 23:42) (18 - 18)  SpO2: 94% (26 Sep 2024 23:42) (94% - 94%)    PHYSICAL EXAM:  CHEST/LUNG: clear no wheezes  HEART: S1 S2; no murmurs  ABDOMEN: Soft, Nontender  EXTREMITIES:  no edema  NEUROLOGY: AO x 3 non-focal  SKIN: No rashes or lesions    LABS:                      All consultant(s) notes reviewed and care discussed with other providers        Contact Number, Dr Mathew [phone]
Patient is a 88y old  Female who presents with a chief complaint of Chart Reviewed, Events noted       DATE OF SERVICE: 09-26-24 @ 13:59    SUBJECTIVE / OVERNIGHT EVENTS: overnight events noted    ROS:  Resp: No cough no sputum production  CVS: No chest pain no palpitations no orthopnea  + congestion   GI: no N/V/D  c/o sore throat        MEDICATIONS  (STANDING):  apixaban 2.5 milliGRAM(s) Oral every 12 hours  aspirin enteric coated 81 milliGRAM(s) Oral daily  atenolol  Tablet 12.5 milliGRAM(s) Oral daily  atorvastatin 40 milliGRAM(s) Oral at bedtime  chlorhexidine 2% Cloths 1 Application(s) Topical daily  enalapril 2.5 milliGRAM(s) Oral <User Schedule>  enalapril 5 milliGRAM(s) Oral <User Schedule>  escitalopram 15 milliGRAM(s) Oral daily  influenza  Vaccine (HIGH DOSE) 0.5 milliLiter(s) IntraMuscular once  remdesivir  IVPB   IV Intermittent   remdesivir  IVPB 100 milliGRAM(s) IV Intermittent every 24 hours    MEDICATIONS  (PRN):  acetaminophen     Tablet .. 650 milliGRAM(s) Oral every 6 hours PRN Mild Pain (1 - 3)  benzocaine/menthol Lozenge 1 Lozenge Oral every 4 hours PRN throat pian  guaiFENesin Oral Liquid (Sugar-Free) 200 milliGRAM(s) Oral every 6 hours PRN Cough        CAPILLARY BLOOD GLUCOSE        I&O's Summary    25 Sep 2024 07:01  -  26 Sep 2024 07:00  --------------------------------------------------------  IN: 0 mL / OUT: 1 mL / NET: -1 mL        Vital Signs Last 24 Hrs  T(C): 36.9 (26 Sep 2024 09:14), Max: 37 (26 Sep 2024 00:58)  T(F): 98.5 (26 Sep 2024 09:14), Max: 98.6 (26 Sep 2024 00:58)  HR: 68 (26 Sep 2024 09:14) (68 - 75)  BP: 159/86 (26 Sep 2024 09:14) (122/69 - 172/91)  BP(mean): --  RR: 18 (26 Sep 2024 09:14) (18 - 18)  SpO2: 94% (26 Sep 2024 09:14) (94% - 96%)    PHYSICAL EXAM:  CHEST/LUNG: few scattered wheezes  HEART: S1 S2; no murmurs  ABDOMEN: Soft, Nontender  EXTREMITIES:  no edema  NEUROLOGY: AO x 3 non-focal  SKIN: No rashes or lesions    LABS:                        12.6   6.57  )-----------( 540      ( 25 Sep 2024 06:54 )             39.2     09-25    136  |  102  |  12  ----------------------------<  104[H]  3.9   |  22  |  0.55    Ca    9.8      25 Sep 2024 06:55  Phos  2.3     09-25  Mg     1.7     09-25    TPro  7.0  /  Alb  4.0  /  TBili  0.6  /  DBili  x   /  AST  21  /  ALT  24  /  AlkPhos  75  09-25          Urinalysis Basic - ( 25 Sep 2024 06:55 )    Color: x / Appearance: x / SG: x / pH: x  Gluc: 104 mg/dL / Ketone: x  / Bili: x / Urobili: x   Blood: x / Protein: x / Nitrite: x   Leuk Esterase: x / RBC: x / WBC x   Sq Epi: x / Non Sq Epi: x / Bacteria: x          All consultant(s) notes reviewed and care discussed with other providers        Contact Number, Dr Mathew [phone]
Patient is a 88y old  Female who presents with a chief complaint of dizziness (24 Sep 2024 13:59)      DATE OF SERVICE: 09-24-24 @ 15:21    SUBJECTIVE / OVERNIGHT EVENTS: overnight events noted    ROS:  Resp: No cough no sputum production  CVS: No chest pain no palpitations no orthopnea  GI: no N/V/D  "I have a cold"    MEDICATIONS  (STANDING):  apixaban 2.5 milliGRAM(s) Oral every 12 hours  aspirin enteric coated 81 milliGRAM(s) Oral daily  atenolol  Tablet 12.5 milliGRAM(s) Oral daily  atorvastatin 40 milliGRAM(s) Oral at bedtime  enalapril 2.5 milliGRAM(s) Oral <User Schedule>  enalapril 5 milliGRAM(s) Oral <User Schedule>  escitalopram 15 milliGRAM(s) Oral daily  influenza  Vaccine (HIGH DOSE) 0.5 milliLiter(s) IntraMuscular once    MEDICATIONS  (PRN):        CAPILLARY BLOOD GLUCOSE        I&O's Summary    23 Sep 2024 07:01  -  24 Sep 2024 07:00  --------------------------------------------------------  IN: 250 mL / OUT: 0 mL / NET: 250 mL        Vital Signs Last 24 Hrs  T(C): 36.8 (24 Sep 2024 07:50), Max: 37.8 (23 Sep 2024 17:16)  T(F): 98.2 (24 Sep 2024 07:50), Max: 100 (23 Sep 2024 17:16)  HR: 75 (24 Sep 2024 11:40) (61 - 75)  BP: 136/71 (24 Sep 2024 11:40) (136/71 - 166/78)  BP(mean): --  RR: 18 (24 Sep 2024 07:50) (18 - 18)  SpO2: 94% (24 Sep 2024 11:40) (92% - 96%)    PHYSICAL EXAM:  NECK: Supple  CHEST/LUNG: clear no wheeze  HEART: S1 S2; no murmurs  ABDOMEN: Soft, Nontender  EXTREMITIES:  no edema  NEUROLOGY: AO x 3 non-focal  SKIN: No rashes or lesions    LABS:                        12.8   5.74  )-----------( 632      ( 23 Sep 2024 07:39 )             39.6     09-23    138  |  101  |  11  ----------------------------<  68[L]  3.5   |  25  |  0.61    Ca    10.0      23 Sep 2024 07:39    TPro  8.1  /  Alb  4.7  /  TBili  0.6  /  DBili  x   /  AST  36  /  ALT  40  /  AlkPhos  91  09-22    PT/INR - ( 22 Sep 2024 17:00 )   PT: 14.8 sec;   INR: 1.42 ratio         PTT - ( 22 Sep 2024 17:00 )  PTT:37.1 sec      Urinalysis Basic - ( 23 Sep 2024 07:39 )    Color: x / Appearance: x / SG: x / pH: x  Gluc: 68 mg/dL / Ketone: x  / Bili: x / Urobili: x   Blood: x / Protein: x / Nitrite: x   Leuk Esterase: x / RBC: x / WBC x   Sq Epi: x / Non Sq Epi: x / Bacteria: x          All consultant(s) notes reviewed and care discussed with other providers        Contact Number, Dr Mathew [phone]
Patient is a 88y old  Female who presents with a chief complaint of dizziness (22 Sep 2024 23:27)  patient not known to me, assigned this AM to assume care  chart reviewed and events thus far noted   admitted overnight by hospitalist colleague     DATE OF SERVICE: 09-23-24 @ 13:25    SUBJECTIVE / OVERNIGHT EVENTS: overnight events noted    ROS:  Resp: No cough no sputum production  CVS: No chest pain no palpitations no orthopnea  GI: no N/V/D  : no dysuria, no hematuria  Neuro: no weakness no paresthesias          MEDICATIONS  (STANDING):  apixaban 2.5 milliGRAM(s) Oral every 12 hours  aspirin enteric coated 81 milliGRAM(s) Oral daily  atenolol  Tablet 12.5 milliGRAM(s) Oral daily  atorvastatin 40 milliGRAM(s) Oral at bedtime  enalapril 2.5 milliGRAM(s) Oral <User Schedule>  enalapril 5 milliGRAM(s) Oral <User Schedule>  escitalopram 15 milliGRAM(s) Oral daily    MEDICATIONS  (PRN):        CAPILLARY BLOOD GLUCOSE  95 (22 Sep 2024 17:43)      POCT Blood Glucose.: 95 mg/dL (22 Sep 2024 16:42)    I&O's Summary    22 Sep 2024 07:01  -  23 Sep 2024 07:00  --------------------------------------------------------  IN: 250 mL / OUT: 0 mL / NET: 250 mL    23 Sep 2024 07:01  -  23 Sep 2024 13:25  --------------------------------------------------------  IN: 250 mL / OUT: 0 mL / NET: 250 mL        Vital Signs Last 24 Hrs  T(C): 36.6 (23 Sep 2024 05:18), Max: 36.8 (22 Sep 2024 20:21)  T(F): 97.9 (23 Sep 2024 05:18), Max: 98.2 (22 Sep 2024 20:21)  HR: 62 (23 Sep 2024 05:18) (60 - 81)  BP: 145/79 (23 Sep 2024 05:18) (145/79 - 208/95)  BP(mean): --  RR: 18 (23 Sep 2024 05:18) (16 - 18)  SpO2: 95% (23 Sep 2024 05:18) (95% - 99%)    PHYSICAL EXAM:  GENERAL: in no apparent distress  HEAD:  Atraumatic, Normocephalic  EYES: EOMI, PERRLA, sclera clear  NECK: Supple, No JVD  CHEST/LUNG: clear b/l, no wheeze  HEART: S1 S2; no murmurs appreciated  ABDOMEN: Soft, Nontender, Bowel sounds present  EXTREMITIES:  No clubbing or cyanosis,  no edema  NEUROLOGY: AO x 3 non-focal  SKIN: No rashes or lesions    LABS:                        12.8   5.74  )-----------( 632      ( 23 Sep 2024 07:39 )             39.6     09-23    138  |  101  |  11  ----------------------------<  68[L]  3.5   |  25  |  0.61    Ca    10.0      23 Sep 2024 07:39    TPro  8.1  /  Alb  4.7  /  TBili  0.6  /  DBili  x   /  AST  36  /  ALT  40  /  AlkPhos  91  09-22    PT/INR - ( 22 Sep 2024 17:00 )   PT: 14.8 sec;   INR: 1.42 ratio         PTT - ( 22 Sep 2024 17:00 )  PTT:37.1 sec      Urinalysis Basic - ( 23 Sep 2024 07:39 )    Color: x / Appearance: x / SG: x / pH: x  Gluc: 68 mg/dL / Ketone: x  / Bili: x / Urobili: x   Blood: x / Protein: x / Nitrite: x   Leuk Esterase: x / RBC: x / WBC x   Sq Epi: x / Non Sq Epi: x / Bacteria: x          All consultant(s) notes reviewed and care discussed with other providers        Contact Number, Dr Mathew [phone]

## 2024-09-27 NOTE — PROGRESS NOTE ADULT - PROBLEM SELECTOR PLAN 1
resolved  retrospectively likely secondary to COVID 19 infection   no changes needed in meds
resolved   likely secondary to COVID 19 infection   no changes needed in meds
similar episode in June, evaluated by neurology, TTE - EF 50% with a moderately dilated left atrium, MRI brain  R centrum semiovale infarct, when ASA 81mg was started.  Stroke protocol with negative CT imaging  - Neurology consult appreciated  discussed with neuro  Recommend cardiology follow up  echocardiogram repeat  monitor on Tele  MRI brain w/o contrast negative   will continue ASA 81 and Eliquis 2.5mg bid  ? increase dose  patient qualifies for 5 BID dosing  - will risk stratify with FLP, TSH and A1C
resolved  retrospectively likely secondary to COVID 19 infection   no changes needed in meds  MR negative CVA
resolved  at baseline  neuro follow up appreciated  no changes needed in meds  MR negative CVA

## 2024-09-27 NOTE — PROGRESS NOTE ADULT - ASSESSMENT
88 year-old female with HTN, HLD, thrombocytosis with unprovoked PE in 2022 (on Eliquis), TIA (on asa), cholecystectomy, recent diverticulitis presenting as a code stroke for dizziness and unsteady gait.  Patient was shopping and at 1:30 PM when she started having dizziness with associated nausea.  She held on the shopping cart due to unsteadiness, drove home, felt like she was going to pass out (but no LOC), laid down on the couch until daughter arrived to bring her to ED. Symptoms now completely resolved without taking Meclizine or Reglan ED ordered.  Currently, no complaints (22 Sep 2024 23:27)    now sheis found to have covid infection hence pulm called:   she has no underlying lung disease     covid infection:   Hx of PE; secondary to essential thrombocytosis?   TIA      covid infection:   -seems pretty good:   -on room air:  d2/3 of remdesivir;   -d d dimer is slightly high : expected from covid:  clinical probability for pe seems pretty low at this time:   -no need for dexa;   -do chest xray  : none done on admission   9/26;  -cxr is normal :   on rooma ir:   prednisone low dosages added for congestion and mild rhonchi +  9/27:  sheis doing  ok :  on room air  sore throat is less today  ;  no cough or phlegm    -cont current rx:   Hx of PE; secondary to essential thrombocytosis?   -on eliqis already   -PLTs counts are high : cont to monitor   TIA  -cont home meds:     dw team  
88 year-old female with HTN, HLD, thrombocytosis with unprovoked PE in 2022 (on Eliquis), TIA (on asa), cholecystectomy, recent diverticulitis presents with acute dizziness and unsteadiness admitted to rule out TAIA/stroke
88 year-old female with HTN, HLD, thrombocytosis with unprovoked PE in 2022 (on Eliquis), TIA (on asa), cholecystectomy, recent diverticulitis presenting as a code stroke for dizziness and unsteady gait.  Patient was shopping and at 1:30 PM when she started having dizziness with associated nausea.  She held on the shopping cart due to unsteadiness, drove home, felt like she was going to pass out (but no LOC), laid down on the couch until daughter arrived to bring her to ED. Symptoms now completely resolved without taking Meclizine or Reglan ED ordered.  Currently, no complaints (22 Sep 2024 23:27)    now sheis found to have covid infection hence pulm called:   she has no underlying lung disease     covid infection:   Hx of PE; secondary to essential thrombocytosis?   TIA      covid infection:   -seems pretty good:   -on room air:  d2/3 of remdesivir;   -d d dimer is slightly high : expected from covid:  clinical probability for pe seems pretty low at this time:   -no need for dexa;   -do chest xray  : none done on admission   9/26;  -cxr is normal :   on rooma ir:   prednisone low dosages added for congestion and mild rhonchi +  Hx of PE; secondary to essential thrombocytosis?   -on eliqis already   -PLTs counts are high : cont to monitor   TIA  -cont home meds:     zeferino PMD  
 88F with HTN. HLD, PE on Eliquis, TIA on aspirin, presented as code stroke for episode of dizziness/lightheadedness. Symptoms largely improved prior to presentation, however noted with elevated blood pressure >200 systolic (patient reports BP this morning was 110s). Had similar episode in June, was diagnosed as TIA. Initial VS in ED: /95. Exam: No focal neurologic deficits, +Romberg, falls to the left during ambulation (per daughter this is patient's baseline).    Stroke imaging was unremarkable.  Labs reviewed, noted for chronic thrombocytosis  pre-mRS: 1  LKN: 9/22 @1330  NIHSS: 0  Not a tenecteplase candidate due to nondisabling symptoms, recent Eliquis use.  Not a mechanical thrombectomy candidate due to no LVO.  CTH neg   CTA H/N neg   o/e 9/23 AM back to baseline   MRI brain normally evolving R centrum semiovale infarct   + COVID   A1c 5.6 /LDL 68    Impression:   1) Episode of lightheadedness and nausea which seems more consistent with presyncopal episode, which may have been brought on by stress or dehydration vs HTN urgency with SBP > 200s. Acute stroke vs TIA cannot be excluded.   2) h/o TIA /Stroke R centrum semiovlae     Recommendations:    Diagnostics:    - airborne precautions for covid.  monitor O2   - Continue home Eliquis 2.5 BID  - Continue home ASA 81 mg PO    - Atorvastatin 40mg sufficient (titrate to LDL < 70)   - Lovenox SQ for DVT prophylaxis  - Check orthostatics   - Recommend further medicine workup for etiology of chronic thrombocytosis  - Gradual BP correction   - Telemonitoring     Wesley Ritter MD  Vascular Neurology  Office: [phone] .   
Agree with above assessment and plan as outlined above.    - COVID (+)  - Teeatment per medicine    Zi Shepherd MD, [facility]  BEEPER [phone]  
Agree with above assessment and plan as outlined above.    - complete tx for COVID per med  - d/c planning    Zi Shepherd MD, [facility]  BEEPER [phone]  
Patient seen and examined, agree with above assessment and plan as transcribed above.    - (+) COVID   - cont BP meds  - slow uptitration   - f/u EP floyd Shepherd MD, [facility]  BEEPER [phone]  
88 year-old female with HTN, HLD, thrombocytosis with unprovoked PE in 2022 (on Eliquis), TIA (on asa), cholecystectomy, recent diverticulitis presents with acute dizziness and unsteadiness admitted to rule out TAIA/stroke

## 2024-09-27 NOTE — PROGRESS NOTE ADULT - PROBLEM SELECTOR PLAN 2
likely cause of admission symptoms  discussed with EP attending  defer possible arrhythmia work up to EP  follow up with outpatient cardiology
throat pain   treat with Cepacol lozenges  scattered wheeze   short course solumedrol IV 20 qd x 3 days  continue Remdesivir x 3rd dose today
currently uncontrolled, missed evening dose of Enalapril 5mg and Atenolol 12.5 mg.   better controlled  cardiology consultation requested
likely cause of admission symptoms  discussed with EP attending  unlikely needs ILR  however if symptoms persist after recovery form COVID 19 infection can follow up with outpatient cardiology
throat pain improved  Tyl;enol PRN  no wheeze today  discharge on prednisone 20 po qd x 3 days   s/p Remdesivir x 3 doses

## 2024-09-27 NOTE — DISCHARGE NOTE NURSING/CASE MANAGEMENT/SOCIAL WORK - PATIENT PORTAL LINK FT
You can access the FollowMyHealth Patient Portal offered by [facility] by registering at the following website: [email or web address]. By joining [employer]’s FollowMyHealth portal, you will also be able to view your health information using other applications (apps) compatible with our system.

## 2024-09-27 NOTE — PROGRESS NOTE ADULT - PROBLEM SELECTOR PLAN 3
- will continue statin
better controlled  cardiology consultation appreciated  will continue to monitor

## 2024-09-27 NOTE — PROGRESS NOTE ADULT - PROBLEM SELECTOR PROBLEM 2
2019 novel coronavirus disease (COVID-19)
2019 novel coronavirus disease (COVID-19)
HTN (hypertension)
2019 novel coronavirus disease (COVID-19)
2019 novel coronavirus disease (COVID-19)

## 2024-09-27 NOTE — DISCHARGE NOTE NURSING/CASE MANAGEMENT/SOCIAL WORK - NSDCFUADDAPPT_GEN_ALL_CORE_FT
APPTS ARE READY TO BE MADE: [x ] YES    Best Family or Patient Contact (if needed):    Additional Information about above appointments (if needed):    1: Follow up with PCP and cardiologist      Other comments or requests:

## 2024-09-27 NOTE — PROGRESS NOTE ADULT - PROBLEM SELECTOR PROBLEM 7
Anxiety and depression
Prophylactic measure
Anxiety and depression

## 2024-09-27 NOTE — PROGRESS NOTE ADULT - PROVIDER SPECIALTY LIST ADULT
Cardiology
Neurology
Pulmonology
Pulmonology
Internal Medicine

## 2024-09-27 NOTE — PROGRESS NOTE ADULT - NSPROGADDITIONALINFOA_GEN_ALL_CORE
discharge plan Fri after 3 days Remdesivir
discussed with patient, expresses understanding of treatment plans.  bijal at bedside  discussed with son Velasquez over the phone
discharge home  discussed with patient's son  over the phone in detail
discussed with son over the phone

## 2024-09-27 NOTE — DISCHARGE NOTE NURSING/CASE MANAGEMENT/SOCIAL WORK - NSDCVIVACCINE_GEN_ALL_CORE_FT
Tdap; 14-Nov-2022 11:07; Cara Wright (ROHAN); [employer]; [id number] (Exp. Date: 01-Jun-2024); IntraMuscular; Deltoid Right.; 0.5 milliLiter(s); VIS (VIS Published: 09-May-2013, VIS Presented: 14-Nov-2022);

## 2024-09-27 NOTE — PROGRESS NOTE ADULT - PROBLEM SELECTOR PLAN 6
followed with Heme (Dr. Bowman)
followed with Heme (Dr. Bowman)
- will continue Lexapro
followed with Heme (Dr. Bowman)
followed with Heme (Dr. Bowman)

## 2024-09-27 NOTE — PROGRESS NOTE ADULT - NUTRITIONAL ASSESSMENT
This patient has been assessed with a concern for Malnutrition and has been determined to have a diagnosis/diagnoses of Severe protein-calorie malnutrition and Underweight (BMI < 19).    This patient is being managed with:   Diet Regular-  Supplement Feeding Modality:  Oral  Ensure Plus High Protein Cans or Servings Per Day:  1       Frequency:  Daily  Entered: Sep 26 2024 11:54AM  
This patient has been assessed with a concern for Malnutrition and has been determined to have a diagnosis/diagnoses of Severe protein-calorie malnutrition and Underweight (BMI < 19).    This patient is being managed with:   Diet Regular-  Supplement Feeding Modality:  Oral  Ensure Plus High Protein Cans or Servings Per Day:  1       Frequency:  Daily  Entered: Sep 26 2024 11:54AM

## 2024-09-27 NOTE — PROGRESS NOTE ADULT - PROBLEM SELECTOR PROBLEM 4
HLD (hyperlipidemia)
HLD (hyperlipidemia)
Pulmonary embolism
HLD (hyperlipidemia)
HLD (hyperlipidemia)

## 2024-09-29 PROCEDURE — 84100 ASSAY OF PHOSPHORUS: CPT

## 2024-09-29 PROCEDURE — 96374 THER/PROPH/DIAG INJ IV PUSH: CPT

## 2024-09-29 PROCEDURE — 82803 BLOOD GASES ANY COMBINATION: CPT

## 2024-09-29 PROCEDURE — 82330 ASSAY OF CALCIUM: CPT

## 2024-09-29 PROCEDURE — 82435 ASSAY OF BLOOD CHLORIDE: CPT

## 2024-09-29 PROCEDURE — 81003 URINALYSIS AUTO W/O SCOPE: CPT

## 2024-09-29 PROCEDURE — 82947 ASSAY GLUCOSE BLOOD QUANT: CPT

## 2024-09-29 PROCEDURE — 74177 CT ABD & PELVIS W/CONTRAST: CPT | Mod: MC

## 2024-09-29 PROCEDURE — 84295 ASSAY OF SERUM SODIUM: CPT

## 2024-09-29 PROCEDURE — 85014 HEMATOCRIT: CPT

## 2024-09-29 PROCEDURE — 80053 COMPREHEN METABOLIC PANEL: CPT

## 2024-09-29 PROCEDURE — 87086 URINE CULTURE/COLONY COUNT: CPT

## 2024-09-29 PROCEDURE — 85025 COMPLETE CBC W/AUTO DIFF WBC: CPT

## 2024-09-29 PROCEDURE — 83605 ASSAY OF LACTIC ACID: CPT

## 2024-09-29 PROCEDURE — 84132 ASSAY OF SERUM POTASSIUM: CPT

## 2024-09-29 PROCEDURE — 99285 EMERGENCY DEPT VISIT HI MDM: CPT | Mod: 25

## 2024-09-29 PROCEDURE — 85018 HEMOGLOBIN: CPT

## 2024-09-29 PROCEDURE — 83735 ASSAY OF MAGNESIUM: CPT

## 2024-09-29 PROCEDURE — 85027 COMPLETE CBC AUTOMATED: CPT

## 2024-09-29 PROCEDURE — 83690 ASSAY OF LIPASE: CPT

## 2024-09-29 PROCEDURE — 80048 BASIC METABOLIC PNL TOTAL CA: CPT

## 2024-12-02 PROBLEM — Z86.73 PERSONAL HISTORY OF TRANSIENT ISCHEMIC ATTACK (TIA), AND CEREBRAL INFARCTION WITHOUT RESIDUAL DEFICITS: Chronic | Status: ACTIVE | Noted: 2024-09-22

## 2024-12-02 PROBLEM — I26.99 OTHER PULMONARY EMBOLISM WITHOUT ACUTE COR PULMONALE: Chronic | Status: ACTIVE | Noted: 2024-09-22

## 2024-12-04 NOTE — DIETITIAN INITIAL EVALUATION ADULT - ENERGY INTAKE
Pt reports good appetite and PO intake since admission, 51-75% PO intake per flow sheets. Pt reports she is enjoying institutional meals. Pt is currently ordered for a no therapeutic restrictions. Food preferences obtained, will honor as able. Pt is not amenable to nutritional supplements at this time, states she does not like taste.     -Nutrition-related concerns:  - Pt is ordered for Lipitor.  Maybe/Moderate

## 2024-12-06 ENCOUNTER — NON-APPOINTMENT (OUTPATIENT)
Age: 89
End: 2024-12-06

## 2024-12-06 ENCOUNTER — APPOINTMENT (OUTPATIENT)
Dept: OTOLARYNGOLOGY | Facility: CLINIC | Age: 89
End: 2024-12-06
Payer: MEDICARE

## 2024-12-06 VITALS
HEIGHT: 64 IN | HEART RATE: 64 BPM | BODY MASS INDEX: 19.63 KG/M2 | DIASTOLIC BLOOD PRESSURE: 99 MMHG | WEIGHT: 115 LBS | OXYGEN SATURATION: 98 % | SYSTOLIC BLOOD PRESSURE: 188 MMHG | TEMPERATURE: 98.2 F

## 2024-12-06 DIAGNOSIS — I27.20 PULMONARY HYPERTENSION, UNSPECIFIED: ICD-10-CM

## 2024-12-06 DIAGNOSIS — J34.89 OTHER SPECIFIED DISORDERS OF NOSE AND NASAL SINUSES: ICD-10-CM

## 2024-12-06 DIAGNOSIS — Z87.891 PERSONAL HISTORY OF NICOTINE DEPENDENCE: ICD-10-CM

## 2024-12-06 DIAGNOSIS — I26.99 OTHER PULMONARY EMBOLISM W/OUT ACUTE COR PULMONALE: ICD-10-CM

## 2024-12-06 PROCEDURE — 99205 OFFICE O/P NEW HI 60 MIN: CPT | Mod: 25

## 2024-12-06 PROCEDURE — 31231 NASAL ENDOSCOPY DX: CPT

## 2024-12-06 RX ORDER — ASPIRIN 81 MG
81 TABLET, DELAYED RELEASE (ENTERIC COATED) ORAL
Refills: 0 | Status: ACTIVE | COMMUNITY

## 2024-12-06 RX ORDER — ENALAPRIL MALEATE 5 MG/1
TABLET ORAL
Refills: 0 | Status: ACTIVE | COMMUNITY

## 2024-12-06 RX ORDER — ATORVASTATIN CALCIUM 80 MG/1
TABLET, FILM COATED ORAL
Refills: 0 | Status: ACTIVE | COMMUNITY

## 2024-12-06 RX ORDER — ATENOLOL 50 MG/1
TABLET ORAL
Refills: 0 | Status: ACTIVE | COMMUNITY

## 2024-12-06 RX ORDER — ESCITALOPRAM OXALATE 5 MG/1
TABLET, FILM COATED ORAL
Refills: 0 | Status: ACTIVE | COMMUNITY

## 2024-12-18 ENCOUNTER — APPOINTMENT (OUTPATIENT)
Dept: OTOLARYNGOLOGY | Facility: CLINIC | Age: 89
End: 2024-12-18

## 2025-07-10 ENCOUNTER — EMERGENCY (EMERGENCY)
Facility: HOSPITAL | Age: 89
LOS: 1 days | End: 2025-07-10
Attending: EMERGENCY MEDICINE
Payer: MEDICARE

## 2025-07-10 VITALS
OXYGEN SATURATION: 95 % | TEMPERATURE: 97 F | DIASTOLIC BLOOD PRESSURE: 92 MMHG | HEART RATE: 72 BPM | RESPIRATION RATE: 18 BRPM | SYSTOLIC BLOOD PRESSURE: 185 MMHG | HEIGHT: 64 IN | WEIGHT: 117.07 LBS

## 2025-07-10 VITALS
OXYGEN SATURATION: 98 % | SYSTOLIC BLOOD PRESSURE: 180 MMHG | HEART RATE: 68 BPM | DIASTOLIC BLOOD PRESSURE: 84 MMHG | RESPIRATION RATE: 18 BRPM

## 2025-07-10 DIAGNOSIS — Z98.890 OTHER SPECIFIED POSTPROCEDURAL STATES: Chronic | ICD-10-CM

## 2025-07-10 DIAGNOSIS — Z90.49 ACQUIRED ABSENCE OF OTHER SPECIFIED PARTS OF DIGESTIVE TRACT: Chronic | ICD-10-CM

## 2025-07-10 LAB
ALBUMIN SERPL ELPH-MCNC: 4 G/DL — SIGNIFICANT CHANGE UP (ref 3.3–5)
ALP SERPL-CCNC: 152 U/L — HIGH (ref 40–120)
ALT FLD-CCNC: 31 U/L — SIGNIFICANT CHANGE UP (ref 10–45)
ANION GAP SERPL CALC-SCNC: 14 MMOL/L — SIGNIFICANT CHANGE UP (ref 5–17)
AST SERPL-CCNC: 31 U/L — SIGNIFICANT CHANGE UP (ref 10–40)
BILIRUB SERPL-MCNC: 0.4 MG/DL — SIGNIFICANT CHANGE UP (ref 0.2–1.2)
BUN SERPL-MCNC: 13 MG/DL — SIGNIFICANT CHANGE UP (ref 7–23)
CALCIUM SERPL-MCNC: 10.2 MG/DL — SIGNIFICANT CHANGE UP (ref 8.4–10.5)
CHLORIDE SERPL-SCNC: 96 MMOL/L — SIGNIFICANT CHANGE UP (ref 96–108)
CO2 SERPL-SCNC: 20 MMOL/L — LOW (ref 22–31)
CREAT SERPL-MCNC: 0.54 MG/DL — SIGNIFICANT CHANGE UP (ref 0.5–1.3)
EGFR: 89 ML/MIN/1.73M2 — SIGNIFICANT CHANGE UP
EGFR: 89 ML/MIN/1.73M2 — SIGNIFICANT CHANGE UP
GLUCOSE SERPL-MCNC: 99 MG/DL — SIGNIFICANT CHANGE UP (ref 70–99)
POTASSIUM SERPL-MCNC: 4.7 MMOL/L — SIGNIFICANT CHANGE UP (ref 3.5–5.3)
POTASSIUM SERPL-SCNC: 4.7 MMOL/L — SIGNIFICANT CHANGE UP (ref 3.5–5.3)
PROT SERPL-MCNC: 6.9 G/DL — SIGNIFICANT CHANGE UP (ref 6–8.3)
SODIUM SERPL-SCNC: 130 MMOL/L — LOW (ref 135–145)

## 2025-07-10 PROCEDURE — 80053 COMPREHEN METABOLIC PANEL: CPT

## 2025-07-10 PROCEDURE — 99283 EMERGENCY DEPT VISIT LOW MDM: CPT | Mod: 25

## 2025-07-10 PROCEDURE — 99284 EMERGENCY DEPT VISIT MOD MDM: CPT

## 2025-07-10 PROCEDURE — 93010 ELECTROCARDIOGRAM REPORT: CPT

## 2025-07-10 PROCEDURE — 36000 PLACE NEEDLE IN VEIN: CPT

## 2025-07-10 PROCEDURE — 93005 ELECTROCARDIOGRAM TRACING: CPT

## 2025-07-10 NOTE — ED PROVIDER NOTE - NS ED ATTENDING STATEMENT MOD
"Chief Complaint   Patient presents with     Sinus Problem       Initial /64 (Cuff Size: Adult Regular)  Pulse 84  Temp 99.1  F (37.3  C) (Tympanic)  Ht 5' 5\" (1.651 m)  Wt 157 lb (71.2 kg)  BMI 26.13 kg/m2 Estimated body mass index is 26.13 kg/(m^2) as calculated from the following:    Height as of this encounter: 5' 5\" (1.651 m).    Weight as of this encounter: 157 lb (71.2 kg).  Medication Reconciliation: complete    Health Maintenance that is potentially due pending provider review:  NONE    n/a    Is there anyone who you would like to be able to receive your results? No  If yes have patient fill out ALLEGRA    Rosa Duarte CMA    "
Quetiapine
Attending Only

## 2025-07-10 NOTE — ED PROVIDER NOTE - PHYSICAL EXAMINATION
· CONSTITUTIONAL: In no apparent distress.  · HEENMT:  Moist oral mucosa  · CARDIAC: Regular rate and rhythm, Heart sounds S1 S2 present  · RESPIRATORY: Lungs sounds clear with good aeration bilaterally.  · GASTROINTESTINAL: Abdomen soft, non-tender and non-distended  · MUSCULOSKELETAL: Movement of extremities grossly intact  · NEUROLOGICAL: Alert and interactive, no focal deficits, normal tone  · SKIN: Ecchymosis over bilateral AC at site of prior blood draw. No cyanosis, no pallor, no jaundice, no rash

## 2025-07-10 NOTE — ED ADULT NURSE NOTE - OBJECTIVE STATEMENT
Pt is 89y female complaining of abnormal lab result. AOx4, pt went for routine lab work yesterday and results came back with high potassium 6.3 and sodium 132. Pt states lab staff had difficulty when drawing her blood and stuck her multiple times. Pt denies CP, SOB, headache, n/v/d, dizziness, palpitations. Breathing spontaneous, chest rise symmetrical. Abd soft, nondistended. Pt is ambulatory. Safety and comfort measures provided.

## 2025-07-10 NOTE — ED PROVIDER NOTE - CLINICAL SUMMARY MEDICAL DECISION MAKING FREE TEXT BOX
Heaven Pascal MD - Attending Physician: 89yoF here after abnormal labs outpatient. Asymptomatic - had done as routine well check. Labs c/w hemolyzed specimens per paperwork. EKG here without peaked Twaves or wide QRS but noted Twave inversions in V4-V6. Pt has cards f/u, has no prior cardiac abnormalities on work-ups, and no current/recent CP/SOB/Exercise intolerance. Repeat CMP for eval

## 2025-07-10 NOTE — ED ADULT TRIAGE NOTE - CHIEF COMPLAINT QUOTE
routine blood work yesterday showing high potassium 6.3, sodium 132  platelets are also high however son states "it's always high"

## 2025-07-10 NOTE — ED PROVIDER NOTE - PATIENT PORTAL LINK FT
You can access the FollowMyHealth Patient Portal offered by [facility] by registering at the following website: [email or web address]. By joining [facility]’s FollowMyHealth portal, you will also be able to view your health information using other applications (apps) compatible with our system.

## 2025-07-10 NOTE — ED PROVIDER NOTE - OBJECTIVE STATEMENT
89-year-old female here for abnormal outpatient labs.  Patient had routine PMD follow up and had screening labs performed.  Per paperwork at bedside noted to have a potassium of 6.3 and a sodium of 132.  Did note hemolysis under reported lab.  Sent in by PMD due to concern for abnormalities.  Of note patient has a history of thrombocytosis with platelets of 500-800 at her baseline.  Patient denies any complaints.  Reports feels well.  He is due to see cardiology next week for routine testing.  Has previously had negative cardiac evaluations.

## 2025-08-01 NOTE — PROGRESS NOTE ADULT - PROBLEM SELECTOR PLAN 5
Called patient and informed her she is due for another mammogram next month. Informed her per the breast imaging center, she will need to complete the additional views needed from last years mammogram before she can proceed with new mammogram. Patient was hesitant to get additional views previously. Patient stated she was agreeable to getting additional views done now. Patient requested doing this towards the end of the month. Informed patient I would have scheduling reach out to breast imaging to set up new appointment for additional views. Patient stated understanding and had no further questions.     
continue metamucil
continue metamucil PRN
continue metamucil PRN